# Patient Record
Sex: MALE | Race: BLACK OR AFRICAN AMERICAN | URBAN - METROPOLITAN AREA
[De-identification: names, ages, dates, MRNs, and addresses within clinical notes are randomized per-mention and may not be internally consistent; named-entity substitution may affect disease eponyms.]

---

## 2017-01-18 ENCOUNTER — INPATIENT (INPATIENT)
Facility: HOSPITAL | Age: 62
LOS: 4 days | Discharge: HOME HEALTH | End: 2017-01-23
Attending: INTERNAL MEDICINE | Admitting: INTERNAL MEDICINE
Payer: MEDICARE

## 2017-01-18 VITALS
HEART RATE: 83 BPM | TEMPERATURE: 102 F | SYSTOLIC BLOOD PRESSURE: 88 MMHG | OXYGEN SATURATION: 100 % | HEIGHT: 63 IN | DIASTOLIC BLOOD PRESSURE: 60 MMHG | RESPIRATION RATE: 21 BRPM | WEIGHT: 115.08 LBS

## 2017-01-18 DIAGNOSIS — E11.22 TYPE 2 DIABETES MELLITUS WITH DIABETIC CHRONIC KIDNEY DISEASE: ICD-10-CM

## 2017-01-18 DIAGNOSIS — A41.9 SEPSIS, UNSPECIFIED ORGANISM: ICD-10-CM

## 2017-01-18 DIAGNOSIS — G93.41 METABOLIC ENCEPHALOPATHY: ICD-10-CM

## 2017-01-18 DIAGNOSIS — N18.5 CHRONIC KIDNEY DISEASE, STAGE 5: ICD-10-CM

## 2017-01-18 DIAGNOSIS — Z93.1 GASTROSTOMY STATUS: Chronic | ICD-10-CM

## 2017-01-18 DIAGNOSIS — G40.909 EPILEPSY, UNSPECIFIED, NOT INTRACTABLE, WITHOUT STATUS EPILEPTICUS: ICD-10-CM

## 2017-01-18 DIAGNOSIS — E11.9 TYPE 2 DIABETES MELLITUS WITHOUT COMPLICATIONS: ICD-10-CM

## 2017-01-18 DIAGNOSIS — F79 UNSPECIFIED INTELLECTUAL DISABILITIES: ICD-10-CM

## 2017-01-18 DIAGNOSIS — Z93.1 GASTROSTOMY STATUS: ICD-10-CM

## 2017-01-18 DIAGNOSIS — N10 ACUTE PYELONEPHRITIS: ICD-10-CM

## 2017-01-18 DIAGNOSIS — K94.23 GASTROSTOMY MALFUNCTION: ICD-10-CM

## 2017-01-18 LAB
ALBUMIN SERPL ELPH-MCNC: 2 G/DL — LOW (ref 3.3–5)
ALP SERPL-CCNC: 73 U/L — SIGNIFICANT CHANGE UP (ref 40–120)
ALT FLD-CCNC: 14 U/L — SIGNIFICANT CHANGE UP (ref 12–78)
ANION GAP SERPL CALC-SCNC: 11 MMOL/L — SIGNIFICANT CHANGE UP (ref 5–17)
APPEARANCE UR: ABNORMAL
AST SERPL-CCNC: 22 U/L — SIGNIFICANT CHANGE UP (ref 15–37)
BACTERIA # UR AUTO: ABNORMAL
BILIRUB SERPL-MCNC: 0.3 MG/DL — SIGNIFICANT CHANGE UP (ref 0.2–1.2)
BILIRUB UR-MCNC: NEGATIVE — SIGNIFICANT CHANGE UP
BUN SERPL-MCNC: 26 MG/DL — HIGH (ref 7–23)
CALCIUM SERPL-MCNC: 8.9 MG/DL — SIGNIFICANT CHANGE UP (ref 8.5–10.1)
CHLORIDE SERPL-SCNC: 95 MMOL/L — LOW (ref 96–108)
CO2 SERPL-SCNC: 33 MMOL/L — HIGH (ref 22–31)
COLOR SPEC: YELLOW — SIGNIFICANT CHANGE UP
CREAT SERPL-MCNC: 4.42 MG/DL — HIGH (ref 0.5–1.3)
DIFF PNL FLD: ABNORMAL
ELLIPTOCYTES BLD QL SMEAR: SLIGHT — SIGNIFICANT CHANGE UP
EPI CELLS # UR: ABNORMAL
GLUCOSE SERPL-MCNC: 134 MG/DL — HIGH (ref 70–99)
GLUCOSE UR QL: NEGATIVE MG/DL — SIGNIFICANT CHANGE UP
HCT VFR BLD CALC: 30.9 % — LOW (ref 39–50)
HGB BLD-MCNC: 9.9 G/DL — LOW (ref 13–17)
HYPOCHROMIA BLD QL: SIGNIFICANT CHANGE UP
KETONES UR-MCNC: ABNORMAL
LACTATE SERPL-SCNC: 0.6 MMOL/L — LOW (ref 0.7–2)
LACTATE SERPL-SCNC: 0.7 MMOL/L — SIGNIFICANT CHANGE UP (ref 0.7–2)
LEUKOCYTE ESTERASE UR-ACNC: ABNORMAL
LYMPHOCYTES # BLD AUTO: 13 % — SIGNIFICANT CHANGE UP (ref 13–44)
MACROCYTES BLD QL: SLIGHT — SIGNIFICANT CHANGE UP
MCHC RBC-ENTMCNC: 30.6 PG — SIGNIFICANT CHANGE UP (ref 27–34)
MCHC RBC-ENTMCNC: 32.2 GM/DL — SIGNIFICANT CHANGE UP (ref 32–36)
MCV RBC AUTO: 95.2 FL — SIGNIFICANT CHANGE UP (ref 80–100)
MONOCYTES NFR BLD AUTO: 5 % — SIGNIFICANT CHANGE UP (ref 2–14)
NEUTROPHILS NFR BLD AUTO: 82 % — HIGH (ref 43–77)
NITRITE UR-MCNC: NEGATIVE — SIGNIFICANT CHANGE UP
OVALOCYTES BLD QL SMEAR: SLIGHT — SIGNIFICANT CHANGE UP
PH UR: 8 — SIGNIFICANT CHANGE UP (ref 4.8–8)
PLAT MORPH BLD: NORMAL — SIGNIFICANT CHANGE UP
PLATELET # BLD AUTO: 226 K/UL — SIGNIFICANT CHANGE UP (ref 150–400)
POLYCHROMASIA BLD QL SMEAR: SLIGHT — SIGNIFICANT CHANGE UP
POTASSIUM SERPL-MCNC: 4 MMOL/L — SIGNIFICANT CHANGE UP (ref 3.5–5.3)
POTASSIUM SERPL-SCNC: 4 MMOL/L — SIGNIFICANT CHANGE UP (ref 3.5–5.3)
PROT SERPL-MCNC: 7.2 GM/DL — SIGNIFICANT CHANGE UP (ref 6–8.3)
PROT UR-MCNC: 500 MG/DL
RBC # BLD: 3.25 M/UL — LOW (ref 4.2–5.8)
RBC # FLD: 13.9 % — SIGNIFICANT CHANGE UP (ref 11–15)
RBC BLD AUTO: ABNORMAL
RBC CASTS # UR COMP ASSIST: ABNORMAL /HPF (ref 0–4)
SODIUM SERPL-SCNC: 139 MMOL/L — SIGNIFICANT CHANGE UP (ref 135–145)
SP GR SPEC: 1.01 — SIGNIFICANT CHANGE UP (ref 1.01–1.02)
UROBILINOGEN FLD QL: NEGATIVE MG/DL — SIGNIFICANT CHANGE UP
WBC # BLD: 14.6 K/UL — HIGH (ref 3.8–10.5)
WBC # FLD AUTO: 14.6 K/UL — HIGH (ref 3.8–10.5)
WBC UR QL: >50

## 2017-01-18 PROCEDURE — 99223 1ST HOSP IP/OBS HIGH 75: CPT

## 2017-01-18 PROCEDURE — 71010: CPT | Mod: 26

## 2017-01-18 PROCEDURE — 99291 CRITICAL CARE FIRST HOUR: CPT

## 2017-01-18 RX ORDER — DEXTROSE 50 % IN WATER 50 %
25 SYRINGE (ML) INTRAVENOUS ONCE
Qty: 0 | Refills: 0 | Status: DISCONTINUED | OUTPATIENT
Start: 2017-01-18 | End: 2017-01-23

## 2017-01-18 RX ORDER — AMLODIPINE BESYLATE 2.5 MG/1
10 TABLET ORAL DAILY
Qty: 0 | Refills: 0 | Status: DISCONTINUED | OUTPATIENT
Start: 2017-01-19 | End: 2017-01-23

## 2017-01-18 RX ORDER — SODIUM CHLORIDE 9 MG/ML
1590 INJECTION INTRAMUSCULAR; INTRAVENOUS; SUBCUTANEOUS ONCE
Qty: 0 | Refills: 0 | Status: COMPLETED | OUTPATIENT
Start: 2017-01-18 | End: 2017-01-18

## 2017-01-18 RX ORDER — CALCIUM ACETATE 667 MG
667 TABLET ORAL
Qty: 0 | Refills: 0 | Status: DISCONTINUED | OUTPATIENT
Start: 2017-01-18 | End: 2017-01-23

## 2017-01-18 RX ORDER — SODIUM CHLORIDE 9 MG/ML
1000 INJECTION, SOLUTION INTRAVENOUS
Qty: 0 | Refills: 0 | Status: DISCONTINUED | OUTPATIENT
Start: 2017-01-18 | End: 2017-01-18

## 2017-01-18 RX ORDER — ACETAMINOPHEN 500 MG
650 TABLET ORAL EVERY 6 HOURS
Qty: 0 | Refills: 0 | Status: DISCONTINUED | OUTPATIENT
Start: 2017-01-18 | End: 2017-01-23

## 2017-01-18 RX ORDER — LEVETIRACETAM 250 MG/1
750 TABLET, FILM COATED ORAL
Qty: 0 | Refills: 0 | Status: DISCONTINUED | OUTPATIENT
Start: 2017-01-18 | End: 2017-01-19

## 2017-01-18 RX ORDER — SODIUM CHLORIDE 9 MG/ML
3 INJECTION INTRAMUSCULAR; INTRAVENOUS; SUBCUTANEOUS ONCE
Qty: 0 | Refills: 0 | Status: COMPLETED | OUTPATIENT
Start: 2017-01-18 | End: 2017-01-18

## 2017-01-18 RX ORDER — SIMVASTATIN 20 MG/1
20 TABLET, FILM COATED ORAL AT BEDTIME
Qty: 0 | Refills: 0 | Status: DISCONTINUED | OUTPATIENT
Start: 2017-01-18 | End: 2017-01-23

## 2017-01-18 RX ORDER — SODIUM CHLORIDE 9 MG/ML
1000 INJECTION, SOLUTION INTRAVENOUS
Qty: 0 | Refills: 0 | Status: DISCONTINUED | OUTPATIENT
Start: 2017-01-18 | End: 2017-01-23

## 2017-01-18 RX ORDER — INSULIN LISPRO 100/ML
VIAL (ML) SUBCUTANEOUS EVERY 6 HOURS
Qty: 0 | Refills: 0 | Status: DISCONTINUED | OUTPATIENT
Start: 2017-01-18 | End: 2017-01-23

## 2017-01-18 RX ORDER — GLUCAGON INJECTION, SOLUTION 0.5 MG/.1ML
1 INJECTION, SOLUTION SUBCUTANEOUS ONCE
Qty: 0 | Refills: 0 | Status: DISCONTINUED | OUTPATIENT
Start: 2017-01-18 | End: 2017-01-23

## 2017-01-18 RX ORDER — SODIUM CHLORIDE 9 MG/ML
1000 INJECTION, SOLUTION INTRAVENOUS
Qty: 0 | Refills: 0 | Status: DISCONTINUED | OUTPATIENT
Start: 2017-01-18 | End: 2017-01-19

## 2017-01-18 RX ORDER — OXCARBAZEPINE 300 MG/1
600 TABLET, FILM COATED ORAL
Qty: 0 | Refills: 0 | Status: DISCONTINUED | OUTPATIENT
Start: 2017-01-18 | End: 2017-01-23

## 2017-01-18 RX ORDER — HEPARIN SODIUM 5000 [USP'U]/ML
5000 INJECTION INTRAVENOUS; SUBCUTANEOUS EVERY 12 HOURS
Qty: 0 | Refills: 0 | Status: DISCONTINUED | OUTPATIENT
Start: 2017-01-18 | End: 2017-01-23

## 2017-01-18 RX ORDER — DEXTROSE 50 % IN WATER 50 %
1 SYRINGE (ML) INTRAVENOUS ONCE
Qty: 0 | Refills: 0 | Status: DISCONTINUED | OUTPATIENT
Start: 2017-01-18 | End: 2017-01-23

## 2017-01-18 RX ORDER — PIPERACILLIN AND TAZOBACTAM 4; .5 G/20ML; G/20ML
3.38 INJECTION, POWDER, LYOPHILIZED, FOR SOLUTION INTRAVENOUS ONCE
Qty: 0 | Refills: 0 | Status: COMPLETED | OUTPATIENT
Start: 2017-01-18 | End: 2017-01-18

## 2017-01-18 RX ORDER — ACETAMINOPHEN 500 MG
650 TABLET ORAL ONCE
Qty: 0 | Refills: 0 | Status: COMPLETED | OUTPATIENT
Start: 2017-01-18 | End: 2017-01-18

## 2017-01-18 RX ORDER — VANCOMYCIN HCL 1 G
1000 VIAL (EA) INTRAVENOUS ONCE
Qty: 0 | Refills: 0 | Status: COMPLETED | OUTPATIENT
Start: 2017-01-18 | End: 2017-01-18

## 2017-01-18 RX ORDER — ALBUTEROL 90 UG/1
1.25 AEROSOL, METERED ORAL EVERY 6 HOURS
Qty: 0 | Refills: 0 | Status: DISCONTINUED | OUTPATIENT
Start: 2017-01-18 | End: 2017-01-23

## 2017-01-18 RX ORDER — LACTOBACILLUS ACIDOPHILUS 100MM CELL
1 CAPSULE ORAL
Qty: 0 | Refills: 0 | Status: DISCONTINUED | OUTPATIENT
Start: 2017-01-18 | End: 2017-01-23

## 2017-01-18 RX ORDER — DEXTROSE 50 % IN WATER 50 %
12.5 SYRINGE (ML) INTRAVENOUS ONCE
Qty: 0 | Refills: 0 | Status: DISCONTINUED | OUTPATIENT
Start: 2017-01-18 | End: 2017-01-23

## 2017-01-18 RX ORDER — PIPERACILLIN AND TAZOBACTAM 4; .5 G/20ML; G/20ML
3.38 INJECTION, POWDER, LYOPHILIZED, FOR SOLUTION INTRAVENOUS EVERY 12 HOURS
Qty: 0 | Refills: 0 | Status: DISCONTINUED | OUTPATIENT
Start: 2017-01-18 | End: 2017-01-19

## 2017-01-18 RX ADMIN — SODIUM CHLORIDE 3 MILLILITER(S): 9 INJECTION INTRAMUSCULAR; INTRAVENOUS; SUBCUTANEOUS at 16:01

## 2017-01-18 RX ADMIN — SODIUM CHLORIDE 1590 MILLILITER(S): 9 INJECTION INTRAMUSCULAR; INTRAVENOUS; SUBCUTANEOUS at 16:01

## 2017-01-18 RX ADMIN — Medication 250 MILLIGRAM(S): at 16:01

## 2017-01-18 RX ADMIN — PIPERACILLIN AND TAZOBACTAM 200 GRAM(S): 4; .5 INJECTION, POWDER, LYOPHILIZED, FOR SOLUTION INTRAVENOUS at 16:01

## 2017-01-18 RX ADMIN — Medication 650 MILLIGRAM(S): at 15:45

## 2017-01-18 NOTE — H&P ADULT. - HISTORY OF PRESENT ILLNESS
60 y/o M with hx of MR, seizures, hld, cva with residual L sided weakness, ESRD on HD (tu, th, sat),with renal cancer thqt the family declines therapy for  presents with fever and altered mental status. patient's neice reports that he has been having fevers over the past 2 days, and increasingly lethargic over the past5 days.  patient had  dialysis last yesterday  and was subsequently sent to the emergency department. family noticed cough; no recent abd pain, vomiting; + loose, non bloody stools in the past 3 days. ROS otherwise negative

## 2017-01-18 NOTE — H&P ADULT. - PROBLEM SELECTOR PROBLEM 5
Type 2 diabetes mellitus with stage 5 chronic kidney disease not on chronic dialysis, unspecified long term insulin use status

## 2017-01-18 NOTE — ED ADULT NURSE NOTE - SEPSIS SCREEN SEVERE
Suspicion of infection and organ dysfunction symptoms are present.  The patient meets criteria for SEVERE SEPSIS and should be entered into the severe sepsis protocol.

## 2017-01-18 NOTE — ED PROVIDER NOTE - PMH
Altered mental status    Diabetes    Dialysis patient    HTN (hypertension)    Hyperglycemia    Lipids abnormal    Mental retardation    Renal failure    Seizure    Sepsis associated with vascular access catheter, subsequent encounter

## 2017-01-18 NOTE — PROGRESS NOTE ADULT - SUBJECTIVE AND OBJECTIVE BOX
PATIENT FROM MY OFFICE PRACTICE presenting with 1 day of fever and increased lethargy. Admitted to hospitalist service.  PATIENT seen and chart reviewed     Patient is a 61y old  Male who presents with a chief complaint of 1 day of fevr and lethargy    INTERVAL HPI/OVERNIGHT EVENTS:    Family at bedside report 1 week of cough and congestion  102 temp this morning    MEDICATIONS  (STANDING):  heparin  Injectable 5000Unit(s) SubCutaneous every 12 hours  dextrose 5% + sodium chloride 0.9%. 1000milliLiter(s) IV Continuous <Continuous>  piperacillin/tazobactam IVPB. 3.375Gram(s) IV Intermittent every 12 hours    MEDICATIONS  (PRN):  acetaminophen  Suppository 650milliGRAM(s) Rectal every 6 hours PRN For Temp greater than 38 C (100.4 F)        REVIEW OF SYSTEMS:  CONSTITUTIONAL: No fever, weight loss, or fatigue  EYES: No eye pain, visual disturbances, or discharge  ENMT:  No difficulty hearing, tinnitus, vertigo; No sinus or throat pain  NECK: No pain or stiffness  RESPIRATORY: No cough, wheezing, chills or hemoptysis; No shortness of breath  CARDIOVASCULAR: No chest pain, palpitations, dizziness, or leg swelling  GASTROINTESTINAL: No abdominal or epigastric pain. No nausea, vomiting, or hematemesis; No diarrhea or constipation. No melena or hematochezia.  GENITOURINARY: No dysuria, frequency, hematuria, or incontinence  NEUROLOGICAL: No headaches, memory loss, loss of strength, numbness, or tremors  SKIN: No itching, burning, rashes, or lesions      Vital Signs Last 24 Hrs  T(C): 37.7, Max: 38.9 ( @ 15:20)  T(F): 99.9, Max: 102 ( @ 15:20)  HR: 77 (77 - 83)  BP: 127/67 (88/60 - 127/67)  BP(mean): 87 (87 - 87)  RR: 16 (13 - 21)  SpO2: 95% (95% - 100%)    PHYSICAL EXAM:  GENERAL: NAD, well-groomed, well-developed  HEAD:  Atraumatic, Normocephalic  EYES: EOMI, PERRLA, conjunctiva and sclera clear  ENMT: No tonsillar erythema, exudates, or enlargement; Moist mucous membranes   NECK: Supple, No JVD   NERVOUS SYSTEM:  Alert & Oriented X3, Good concentration; Motor Strength 5/5 B/L upper and lower extremities; DTRs 2+ intact and symmetric  CHEST/LUNG: Clear to percussion bilaterally; No rales, rhonchi, wheezing, or rubs  HEART: Regular rate and rhythm; No murmurs, rubs, or gallops  ABDOMEN: Soft, Nontender, Nondistended; Bowel sounds present  EXTREMITIES:  2+ Peripheral Pulses, No clubbing, cyanosis, or edema  LYMPH: No lymphadenopathy noted  SKIN: No rashes or lesions    LABS:                        9.9    14.6  )-----------( 226      ( 2017 15:59 )             30.9     2017 15:59    139    |  95     |  26     ----------------------------<  134    4.0     |  33     |  4.42     Ca    8.9        2017 15:59    TPro  7.2    /  Alb  2.0    /  TBili  0.3    /  DBili  x      /  AST  22     /  ALT  14     /  AlkPhos  73     2017 15:59      Urinalysis Basic - ( 2017 16:03 )    Color: Yellow / Appearance: very cloudy / S.010 / pH: x  Gluc: x / Ketone: Trace  / Bili: Negative / Urobili: Negative mg/dL   Blood: x / Protein: 500 mg/dL / Nitrite: Negative   Leuk Esterase: Moderate / RBC: 3-5 /HPF / WBC >50   Sq Epi: x / Non Sq Epi: Many / Bacteria: TNTC      CAPILLARY BLOOD GLUCOSE  138 (2017 15:27)      RADIOLOGY & ADDITIONAL TESTS:    Imaging Personally Reviewed:  [ ] YES  [ ] NO    Consultant(s) Notes Reviewed:  [ ] YES  [ ] NO    Care Discussed with Consultants/Other Providers [ ] YES  [ ] NO

## 2017-01-18 NOTE — ED PROVIDER NOTE - CARE PLAN
Principal Discharge DX:	Sepsis  Secondary Diagnosis:	Altered mental status  Secondary Diagnosis:	Mental retardation

## 2017-01-18 NOTE — ED ADULT TRIAGE NOTE - CHIEF COMPLAINT QUOTE
patient BIBA he had dialysis yesterday , as per family patient had T 103 at home , patient nonverbal at this time

## 2017-01-19 DIAGNOSIS — N39.0 URINARY TRACT INFECTION, SITE NOT SPECIFIED: ICD-10-CM

## 2017-01-19 DIAGNOSIS — Z99.2 DEPENDENCE ON RENAL DIALYSIS: ICD-10-CM

## 2017-01-19 DIAGNOSIS — D72.829 ELEVATED WHITE BLOOD CELL COUNT, UNSPECIFIED: ICD-10-CM

## 2017-01-19 PROCEDURE — 99223 1ST HOSP IP/OBS HIGH 75: CPT

## 2017-01-19 RX ORDER — SODIUM CHLORIDE 9 MG/ML
1000 INJECTION, SOLUTION INTRAVENOUS
Qty: 0 | Refills: 0 | Status: DISCONTINUED | OUTPATIENT
Start: 2017-01-19 | End: 2017-01-23

## 2017-01-19 RX ORDER — SODIUM CHLORIDE 9 MG/ML
1000 INJECTION, SOLUTION INTRAVENOUS
Qty: 0 | Refills: 0 | Status: DISCONTINUED | OUTPATIENT
Start: 2017-01-19 | End: 2017-01-19

## 2017-01-19 RX ORDER — LEVETIRACETAM 250 MG/1
750 TABLET, FILM COATED ORAL EVERY 12 HOURS
Qty: 0 | Refills: 0 | Status: DISCONTINUED | OUTPATIENT
Start: 2017-01-19 | End: 2017-01-23

## 2017-01-19 RX ORDER — CEFTRIAXONE 500 MG/1
1 INJECTION, POWDER, FOR SOLUTION INTRAMUSCULAR; INTRAVENOUS EVERY 24 HOURS
Qty: 0 | Refills: 0 | Status: DISCONTINUED | OUTPATIENT
Start: 2017-01-19 | End: 2017-01-23

## 2017-01-19 RX ADMIN — SODIUM CHLORIDE 100 MILLILITER(S): 9 INJECTION, SOLUTION INTRAVENOUS at 01:07

## 2017-01-19 RX ADMIN — CEFTRIAXONE 100 GRAM(S): 500 INJECTION, POWDER, FOR SOLUTION INTRAMUSCULAR; INTRAVENOUS at 13:19

## 2017-01-19 RX ADMIN — SODIUM CHLORIDE 40 MILLILITER(S): 9 INJECTION, SOLUTION INTRAVENOUS at 17:29

## 2017-01-19 RX ADMIN — HEPARIN SODIUM 5000 UNIT(S): 5000 INJECTION INTRAVENOUS; SUBCUTANEOUS at 06:00

## 2017-01-19 RX ADMIN — LEVETIRACETAM 430 MILLIGRAM(S): 250 TABLET, FILM COATED ORAL at 05:52

## 2017-01-19 RX ADMIN — OXCARBAZEPINE 600 MILLIGRAM(S): 300 TABLET, FILM COATED ORAL at 17:23

## 2017-01-19 RX ADMIN — ALBUTEROL 1.25 MILLIGRAM(S): 90 AEROSOL, METERED ORAL at 11:16

## 2017-01-19 RX ADMIN — ALBUTEROL 1.25 MILLIGRAM(S): 90 AEROSOL, METERED ORAL at 17:18

## 2017-01-19 RX ADMIN — Medication 1 TABLET(S): at 17:19

## 2017-01-19 RX ADMIN — HEPARIN SODIUM 5000 UNIT(S): 5000 INJECTION INTRAVENOUS; SUBCUTANEOUS at 17:19

## 2017-01-19 RX ADMIN — SIMVASTATIN 20 MILLIGRAM(S): 20 TABLET, FILM COATED ORAL at 21:45

## 2017-01-19 RX ADMIN — ALBUTEROL 1.25 MILLIGRAM(S): 90 AEROSOL, METERED ORAL at 00:41

## 2017-01-19 RX ADMIN — ALBUTEROL 1.25 MILLIGRAM(S): 90 AEROSOL, METERED ORAL at 05:48

## 2017-01-19 RX ADMIN — ALBUTEROL 1.25 MILLIGRAM(S): 90 AEROSOL, METERED ORAL at 23:23

## 2017-01-19 RX ADMIN — Medication 667 MILLIGRAM(S): at 17:19

## 2017-01-19 RX ADMIN — Medication 1: at 06:05

## 2017-01-19 RX ADMIN — Medication 1: at 23:11

## 2017-01-19 RX ADMIN — PIPERACILLIN AND TAZOBACTAM 25 GRAM(S): 4; .5 INJECTION, POWDER, LYOPHILIZED, FOR SOLUTION INTRAVENOUS at 08:09

## 2017-01-19 NOTE — CHART NOTE - NSCHARTNOTEFT_GEN_A_CORE
Per RN and family Peg has not been used in about 3 months due to inability to flush peg. Family has been feeding patient nby mouth over the past couple of months. Patient currently unable to take po medications or food. Keppra changed to IV. recommend GI consult for change of Peg.

## 2017-01-19 NOTE — PROGRESS NOTE ADULT - SUBJECTIVE AND OBJECTIVE BOX
Patient is a 61y old  Male who presents with a chief complaint of     INTERVAL HPI/OVERNIGHT EVENTS:  More awake and alert  tolerating po apple sauce  afebrile    MEDICATIONS  (STANDING):  heparin  Injectable 5000Unit(s) SubCutaneous every 12 hours  OXcarbazepine 600milliGRAM(s) Oral two times a day  simvastatin 20milliGRAM(s) Oral at bedtime  calcium acetate 667milliGRAM(s) Oral three times a day with meals  lactobacillus acidophilus 1Tablet(s) Oral two times a day with meals  multivitamin 1Tablet(s) Oral daily  amLODIPine   Tablet 10milliGRAM(s) Oral daily  ALBUTerol   0.042% 1.25milliGRAM(s) Nebulizer every 6 hours  insulin lispro (HumaLOG) corrective regimen sliding scale  SubCutaneous every 6 hours  dextrose 5%. 1000milliLiter(s) IV Continuous <Continuous>  dextrose 50% Injectable 12.5Gram(s) IV Push once  dextrose 50% Injectable 25Gram(s) IV Push once  dextrose 50% Injectable 25Gram(s) IV Push once  levETIRAcetam  IVPB 750milliGRAM(s) IV Intermittent every 12 hours  cefTRIAXone   IVPB 1Gram(s) IV Intermittent every 24 hours  dextrose 5% + sodium chloride 0.45%. 1000milliLiter(s) IV Continuous <Continuous>    MEDICATIONS  (PRN):  acetaminophen  Suppository 650milliGRAM(s) Rectal every 6 hours PRN For Temp greater than 38 C (100.4 F)  dextrose Gel 1Dose(s) Oral once PRN Blood Glucose LESS THAN 70 milliGRAM(s)/deciliter  glucagon  Injectable 1milliGRAM(s) IntraMuscular once PRN Glucose LESS THAN 70 milligrams/deciliter        REVIEW OF SYSTEMS:  CONSTITUTIONAL: No fever, weight loss, or fatigue  EYES: No eye pain, visual disturbances, or discharge  ENMT:  No difficulty hearing, tinnitus, vertigo; No sinus or throat pain  NECK: No pain or stiffness  RESPIRATORY: No cough, wheezing, chills or hemoptysis; No shortness of breath  CARDIOVASCULAR: No chest pain, palpitations, dizziness, or leg swelling  GASTROINTESTINAL: No abdominal or epigastric pain. No nausea, vomiting, or hematemesis; No diarrhea or constipation. No melena or hematochezia.  GENITOURINARY: No dysuria, frequency, hematuria, or incontinence  NEUROLOGICAL: No headaches, memory loss, loss of strength, numbness, or tremors  SKIN: No itching, burning, rashes, or lesions      Vital Signs Last 24 Hrs  T(C): 36.7, Max: 37.7 ( @ 18:04)  T(F): 98, Max: 99.9 ( @ 18:04)  HR: 77 (70 - 77)  BP: 129/78 (127/67 - 135/78)  BP(mean): 87 (87 - 87)  RR: 18 (13 - 20)  SpO2: 97% (95% - 98%)    PHYSICAL EXAM:  GENERAL: NAD, well-groomed, well-developed  HEAD:  Atraumatic, Normocephalic  EYES: EOMI, PERRLA, conjunctiva and sclera clear  ENMT: No tonsillar erythema, exudates, or enlargement; Moist mucous membranes   NECK: Supple, No JVD   NERVOUS SYSTEM:  Alert & Oriented X3, Good concentration; Motor Strength 5/5 B/L upper and lower extremities; DTRs 2+ intact and symmetric  CHEST/LUNG: Clear to percussion bilaterally; No rales, rhonchi, wheezing, or rubs. left CW permacath  HEART: Regular rate and rhythm; No murmurs, rubs, or gallops  ABDOMEN: Soft, Nontender, Nondistended; Bowel sounds present. PEG in place clogged  EXTREMITIES:  2+ Peripheral Pulses, No clubbing, cyanosis, or edema  LYMPH: No lymphadenopathy noted  SKIN: No rashes or lesions    LABS:                        9.9    14.6  )-----------( 226      ( 2017 15:59 )             30.9     2017 15:59    139    |  95     |  26     ----------------------------<  134    4.0     |  33     |  4.42     Ca    8.9        2017 15:59    TPro  7.2    /  Alb  2.0    /  TBili  0.3    /  DBili  x      /  AST  22     /  ALT  14     /  AlkPhos  73     2017 15:59      Urinalysis Basic - ( 2017 16:03 )    Color: Yellow / Appearance: very cloudy / S.010 / pH: x  Gluc: x / Ketone: Trace  / Bili: Negative / Urobili: Negative mg/dL   Blood: x / Protein: 500 mg/dL / Nitrite: Negative   Leuk Esterase: Moderate / RBC: 3-5 /HPF / WBC >50   Sq Epi: x / Non Sq Epi: Many / Bacteria: TNTC      CAPILLARY BLOOD GLUCOSE  165 (2017 11:47)  133 (2017 23:23)      RADIOLOGY & ADDITIONAL TESTS:    Imaging Personally Reviewed:  [ ] YES  [ ] NO    Consultant(s) Notes Reviewed:  [ ] YES  [ ] NO    Care Discussed with Consultants/Other Providers [ ] YES  [ ] NO

## 2017-01-19 NOTE — PATIENT PROFILE ADULT. - NUTRITION PROFILE
enteral nutrition evaluation/chewing/swallowing difficulty/pressure ulcer Stage 2 or greater/unintentional weight loss/dialysis

## 2017-01-19 NOTE — CONSULT NOTE ADULT - SUBJECTIVE AND OBJECTIVE BOX
Infectious Diseases - Attending at Dr. Glover    HPI:  62 y/o M with hx of MR, seizures, hld, cva with residual L sided weakness, ESRD on HD (, th, sat),with renal cancer thqt the family declines therapy for  presents with fever and altered mental status. patient's niece reports that he has been having fevers over the past 2 days, and increasingly lethargic over the past 5 days.  patient had  dialysis last yesterday  and was subsequently sent to the emergency department. family noticed cough; no recent abd pain, vomiting; + loose, non bloody stools in the past 3 days. ROS otherwise negative (2017 19:30)      PAST MEDICAL & SURGICAL HISTORY:  Sepsis associated with vascular access catheter, subsequent encounter  Hyperglycemia  Altered mental status  Dialysis patient  Diabetes  Lipids abnormal  Renal failure  Seizure  HTN (hypertension)  Mental retardation  PEG (percutaneous endoscopic gastrostomy) status  No significant past surgical history      Allergies    Allergy Status Unknown    Intolerances        FAMILY HISTORY:  No pertinent family history in first degree relatives      SOCIAL HISTORY: No smoking, alcohol use    REVIEW OF SYSTEMS:  Unobtainable    MEDICATIONS  (STANDING):  heparin  Injectable 5000Unit(s) SubCutaneous every 12 hours  OXcarbazepine 600milliGRAM(s) Oral two times a day  simvastatin 20milliGRAM(s) Oral at bedtime  calcium acetate 667milliGRAM(s) Oral three times a day with meals  lactobacillus acidophilus 1Tablet(s) Oral two times a day with meals  multivitamin 1Tablet(s) Oral daily  amLODIPine   Tablet 10milliGRAM(s) Oral daily  ALBUTerol   0.042% 1.25milliGRAM(s) Nebulizer every 6 hours  insulin lispro (HumaLOG) corrective regimen sliding scale  SubCutaneous every 6 hours  dextrose 5%. 1000milliLiter(s) IV Continuous <Continuous>  dextrose 50% Injectable 12.5Gram(s) IV Push once  dextrose 50% Injectable 25Gram(s) IV Push once  dextrose 50% Injectable 25Gram(s) IV Push once  levETIRAcetam  IVPB 750milliGRAM(s) IV Intermittent every 12 hours  cefTRIAXone   IVPB 1Gram(s) IV Intermittent every 24 hours  dextrose 5% + sodium chloride 0.45%. 1000milliLiter(s) IV Continuous <Continuous>    MEDICATIONS  (PRN):  acetaminophen  Suppository 650milliGRAM(s) Rectal every 6 hours PRN For Temp greater than 38 C (100.4 F)  dextrose Gel 1Dose(s) Oral once PRN Blood Glucose LESS THAN 70 milliGRAM(s)/deciliter  glucagon  Injectable 1milliGRAM(s) IntraMuscular once PRN Glucose LESS THAN 70 milligrams/deciliter      Vital Signs Last 24 Hrs  T(C): 36.7, Max: 37.7 ( @ 18:04)  T(F): 98, Max: 99.9 ( @ 18:04)  HR: 77 (70 - 77)  BP: 129/78 (114/40 - 135/78)  BP(mean): 87 (87 - 87)  RR: 18 (13 - 20)  SpO2: 97% (95% - 98%)    PHYSICAL EXAM:    Constitutional: NAD, well-groomed, well-developed  HEENT: PERRLA, EOMI, Normal Hearing, MMM  Neck: No LAD, No JVD  Back: Normal spine flexure, No CVA tenderness  Respiratory: CTAB/L   Cardiovascular: S1 and S2, RRR, no M/G/R  Gastrointestinal: BS+, soft, NT/ND  Extremities: No peripheral edema  Vascular: 2+ peripheral pulses  Neurological: A/O x 3, no focal deficits  Skin: No rashes      LABS:                        9.9    14.6  )-----------( 226      ( 2017 15:59 )             30.9     2017 15:59    139    |  95     |  26     ----------------------------<  134    4.0     |  33     |  4.42     Ca    8.9        2017 15:59    TPro  7.2    /  Alb  2.0    /  TBili  0.3    /  DBili  x      /  AST  22     /  ALT  14     /  AlkPhos  73     2017 15:59      Urinalysis Basic - ( 2017 16:03 )    Color: Yellow / Appearance: very cloudy / S.010 / pH: x  Gluc: x / Ketone: Trace  / Bili: Negative / Urobili: Negative mg/dL   Blood: x / Protein: 500 mg/dL / Nitrite: Negative   Leuk Esterase: Moderate / RBC: 3-5 /HPF / WBC >50   Sq Epi: x / Non Sq Epi: Many / Bacteria: TNTC        MICROBIOLOGY:  RECENT CULTURES:        RADIOLOGY & ADDITIONAL STUDIES:
Patient is a 61y old  Male well known to us from multiple prior admissions to this institution, exists in chronic vegetative state, on HD thrice weekly for ESRD who was admitted yest due to fever, lethargy, clogged G-tube. Pos cough, loose BMs. Presently in no distress. Sister at bed side.           PAST MEDICAL & SURGICAL HISTORY:  Sepsis associated with vascular access catheter, subsequent encounter  Hyperglycemia  Altered mental status  Dialysis patient  Diabetes  Lipids abnormal  Renal failure  Seizure  HTN (hypertension)  Mental retardation  PEG (percutaneous endoscopic gastrostomy) status  No significant past surgical history       FAMILY HISTORY:  No pertinent family history in first degree relatives      Allergies    Allergy Status Unknown          MEDICATIONS  (STANDING):  heparin  Injectable 5000Unit(s) SubCutaneous every 12 hours  piperacillin/tazobactam IVPB. 3.375Gram(s) IV Intermittent every 12 hours  OXcarbazepine 600milliGRAM(s) Oral two times a day  simvastatin 20milliGRAM(s) Oral at bedtime  calcium acetate 667milliGRAM(s) Oral three times a day with meals  lactobacillus acidophilus 1Tablet(s) Oral two times a day with meals  multivitamin 1Tablet(s) Oral daily  amLODIPine   Tablet 10milliGRAM(s) Oral daily  ALBUTerol   0.042% 1.25milliGRAM(s) Nebulizer every 6 hours  insulin lispro (HumaLOG) corrective regimen sliding scale  SubCutaneous every 6 hours  dextrose 5%. 1000milliLiter(s) IV Continuous <Continuous>  dextrose 50% Injectable 12.5Gram(s) IV Push once  dextrose 50% Injectable 25Gram(s) IV Push once  dextrose 50% Injectable 25Gram(s) IV Push once  levETIRAcetam  IVPB 750milliGRAM(s) IV Intermittent every 12 hours    MEDICATIONS  (PRN):  acetaminophen  Suppository 650milliGRAM(s) Rectal every 6 hours PRN For Temp greater than 38 C (100.4 F)  dextrose Gel 1Dose(s) Oral once PRN Blood Glucose LESS THAN 70 milliGRAM(s)/deciliter  glucagon  Injectable 1milliGRAM(s) IntraMuscular once PRN Glucose LESS THAN 70 milligrams/deciliter      Daily Height in cm: 160.02 (2017 15:20)    Daily     Drug Dosing Weight  Height (cm): 160 (2017 15:20)  Weight (kg): 46 (2017 23:23)  BMI (kg/m2): 18 (2017 23:23)  BSA (m2): 1.45 (2017 23:23)      REVIEW OF SYSTEMS:  As per HPI            I&O's Detail      I & Os for current day (as of  @ 11:17)  =============================================  IN: 1000 ml / OUT: 0 ml / NET: 1000 ml      PHYSICAL EXAM:    GENERAL: NAD, on VM  HEAD:  Atraumatic, Normocephalic  EYES: EOMI, PERRLA, conjunctiva and sclera clear  ENMT: No tonsillar erythema, exudates, or enlargement; Moist mucous membranes, Good dentition, No lesions  NECK: Supple, No JVD, Normal thyroid  NERVOUS SYSTEM:  Alert & Oriented X3, Good concentration; Motor Strength 5/5 B/L upper and lower extremities; DTRs 2+ intact and symmetric  CHEST/LUNG: coarse BS  HEART: Regular rate and rhythm; No murmurs, rubs, or gallops  ABDOMEN: Soft, Nontender, Nondistended; Bowel sounds present  EXTREMITIES:  2+ Peripheral Pulses, No clubbing, cyanosis, or edema  LYMPH: No lymphadenopathy noted  SKIN: No rashes or lesions  ACCESS: L chest PC    LABS:  CBC Full  -  ( 2017 15:59 )  WBC Count : 14.6 K/uL  Hemoglobin : 9.9 g/dL  Hematocrit : 30.9 %  Platelet Count - Automated : 226 K/uL  Mean Cell Volume : 95.2 fl  Mean Cell Hemoglobin : 30.6 pg  Mean Cell Hemoglobin Concentration : 32.2 gm/dL  Auto Neutrophil # : x  Auto Lymphocyte # : x  Auto Monocyte # : x  Auto Eosinophil # : x  Auto Basophil # : x  Auto Neutrophil % : 82.0 %  Auto Lymphocyte % : 13.0 %  Auto Monocyte % : 5.0 %  Auto Eosinophil % : x  Auto Basophil % : x    2017 15:59    139    |  95     |  26     ----------------------------<  134    4.0     |  33     |  4.42     Ca    8.9        2017 15:59    TPro  7.2    /  Alb  2.0    /  TBili  0.3    /  DBili  x      /  AST  22     /  ALT  14     /  AlkPhos  73     2017 15:59    CAPILLARY BLOOD GLUCOSE  133 (2017 23:23)      Urinalysis Basic - ( 2017 16:03 )    Color: Yellow / Appearance: very cloudy / S.010 / pH: x  Gluc: x / Ketone: Trace  / Bili: Negative / Urobili: Negative mg/dL   Blood: x / Protein: 500 mg/dL / Nitrite: Negative   Leuk Esterase: Moderate / RBC: 3-5 /HPF / WBC >50   Sq Epi: x / Non Sq Epi: Many / Bacteria: TNTC        ASSESSMENT and PLAN:    * Fever -- r/o septicemia, cath associated bacteremia. On empiric Zosyn, Vanco. Follow BCs  * GI eval of dislodged PEG  * No dialytic indication today. Will plan HD for  as Rxed. Consent by sister on chart
full consult dictated

## 2017-01-20 DIAGNOSIS — N30.90 CYSTITIS, UNSPECIFIED WITHOUT HEMATURIA: ICD-10-CM

## 2017-01-20 DIAGNOSIS — N39.0 URINARY TRACT INFECTION, SITE NOT SPECIFIED: ICD-10-CM

## 2017-01-20 LAB
-  AMIKACIN: SIGNIFICANT CHANGE UP
-  AMPICILLIN/SULBACTAM: SIGNIFICANT CHANGE UP
-  AMPICILLIN: SIGNIFICANT CHANGE UP
-  AZTREONAM: SIGNIFICANT CHANGE UP
-  CEFAZOLIN: SIGNIFICANT CHANGE UP
-  CEFEPIME: SIGNIFICANT CHANGE UP
-  CEFOXITIN: SIGNIFICANT CHANGE UP
-  CEFTAZIDIME: SIGNIFICANT CHANGE UP
-  CEFTRIAXONE: SIGNIFICANT CHANGE UP
-  CIPROFLOXACIN: SIGNIFICANT CHANGE UP
-  ERTAPENEM: SIGNIFICANT CHANGE UP
-  GENTAMICIN: SIGNIFICANT CHANGE UP
-  IMIPENEM: SIGNIFICANT CHANGE UP
-  LEVOFLOXACIN: SIGNIFICANT CHANGE UP
-  MEROPENEM: SIGNIFICANT CHANGE UP
-  NITROFURANTOIN: SIGNIFICANT CHANGE UP
-  PIPERACILLIN/TAZOBACTAM: SIGNIFICANT CHANGE UP
-  TOBRAMYCIN: SIGNIFICANT CHANGE UP
-  TRIMETHOPRIM/SULFAMETHOXAZOLE: SIGNIFICANT CHANGE UP
ANION GAP SERPL CALC-SCNC: 11 MMOL/L — SIGNIFICANT CHANGE UP (ref 5–17)
BUN SERPL-MCNC: 35 MG/DL — HIGH (ref 7–23)
CALCIUM SERPL-MCNC: 8 MG/DL — LOW (ref 8.5–10.1)
CHLORIDE SERPL-SCNC: 96 MMOL/L — SIGNIFICANT CHANGE UP (ref 96–108)
CO2 SERPL-SCNC: 30 MMOL/L — SIGNIFICANT CHANGE UP (ref 22–31)
CREAT SERPL-MCNC: 5.48 MG/DL — HIGH (ref 0.5–1.3)
CULTURE RESULTS: SIGNIFICANT CHANGE UP
GLUCOSE SERPL-MCNC: 208 MG/DL — HIGH (ref 70–99)
HCT VFR BLD CALC: 28.5 % — LOW (ref 39–50)
HGB BLD-MCNC: 9.3 G/DL — LOW (ref 13–17)
MCHC RBC-ENTMCNC: 30.6 PG — SIGNIFICANT CHANGE UP (ref 27–34)
MCHC RBC-ENTMCNC: 32.5 GM/DL — SIGNIFICANT CHANGE UP (ref 32–36)
MCV RBC AUTO: 94.1 FL — SIGNIFICANT CHANGE UP (ref 80–100)
METHOD TYPE: SIGNIFICANT CHANGE UP
ORGANISM # SPEC MICROSCOPIC CNT: SIGNIFICANT CHANGE UP
ORGANISM # SPEC MICROSCOPIC CNT: SIGNIFICANT CHANGE UP
PLATELET # BLD AUTO: 208 K/UL — SIGNIFICANT CHANGE UP (ref 150–400)
POTASSIUM SERPL-MCNC: 3.6 MMOL/L — SIGNIFICANT CHANGE UP (ref 3.5–5.3)
POTASSIUM SERPL-SCNC: 3.6 MMOL/L — SIGNIFICANT CHANGE UP (ref 3.5–5.3)
RBC # BLD: 3.03 M/UL — LOW (ref 4.2–5.8)
RBC # FLD: 14.2 % — SIGNIFICANT CHANGE UP (ref 11–15)
SODIUM SERPL-SCNC: 137 MMOL/L — SIGNIFICANT CHANGE UP (ref 135–145)
SPECIMEN SOURCE: SIGNIFICANT CHANGE UP
WBC # BLD: 9.8 K/UL — SIGNIFICANT CHANGE UP (ref 3.8–10.5)
WBC # FLD AUTO: 9.8 K/UL — SIGNIFICANT CHANGE UP (ref 3.8–10.5)

## 2017-01-20 PROCEDURE — 49465 FLUORO EXAM OF G/COLON TUBE: CPT

## 2017-01-20 PROCEDURE — 99232 SBSQ HOSP IP/OBS MODERATE 35: CPT

## 2017-01-20 RX ADMIN — Medication 667 MILLIGRAM(S): at 07:45

## 2017-01-20 RX ADMIN — ALBUTEROL 1.25 MILLIGRAM(S): 90 AEROSOL, METERED ORAL at 18:33

## 2017-01-20 RX ADMIN — ALBUTEROL 1.25 MILLIGRAM(S): 90 AEROSOL, METERED ORAL at 12:07

## 2017-01-20 RX ADMIN — OXCARBAZEPINE 600 MILLIGRAM(S): 300 TABLET, FILM COATED ORAL at 06:07

## 2017-01-20 RX ADMIN — LEVETIRACETAM 430 MILLIGRAM(S): 250 TABLET, FILM COATED ORAL at 21:49

## 2017-01-20 RX ADMIN — Medication 1: at 11:10

## 2017-01-20 RX ADMIN — ALBUTEROL 1.25 MILLIGRAM(S): 90 AEROSOL, METERED ORAL at 06:51

## 2017-01-20 RX ADMIN — AMLODIPINE BESYLATE 10 MILLIGRAM(S): 2.5 TABLET ORAL at 06:08

## 2017-01-20 RX ADMIN — Medication 1 TABLET(S): at 07:45

## 2017-01-20 RX ADMIN — Medication 1 TABLET(S): at 11:11

## 2017-01-20 RX ADMIN — HEPARIN SODIUM 5000 UNIT(S): 5000 INJECTION INTRAVENOUS; SUBCUTANEOUS at 06:08

## 2017-01-20 RX ADMIN — OXCARBAZEPINE 600 MILLIGRAM(S): 300 TABLET, FILM COATED ORAL at 21:50

## 2017-01-20 RX ADMIN — Medication 1: at 23:41

## 2017-01-20 RX ADMIN — SIMVASTATIN 20 MILLIGRAM(S): 20 TABLET, FILM COATED ORAL at 21:50

## 2017-01-20 RX ADMIN — LEVETIRACETAM 430 MILLIGRAM(S): 250 TABLET, FILM COATED ORAL at 06:07

## 2017-01-20 RX ADMIN — CEFTRIAXONE 100 GRAM(S): 500 INJECTION, POWDER, FOR SOLUTION INTRAMUSCULAR; INTRAVENOUS at 13:06

## 2017-01-20 RX ADMIN — Medication 667 MILLIGRAM(S): at 11:12

## 2017-01-20 NOTE — PROGRESS NOTE ADULT - SUBJECTIVE AND OBJECTIVE BOX
Patient seen in follow up for ESRD. Appears comfortable.     MEDICATIONS  (STANDING):  heparin  Injectable 5000Unit(s) SubCutaneous every 12 hours  OXcarbazepine 600milliGRAM(s) Oral two times a day  simvastatin 20milliGRAM(s) Oral at bedtime  calcium acetate 667milliGRAM(s) Oral three times a day with meals  lactobacillus acidophilus 1Tablet(s) Oral two times a day with meals  multivitamin 1Tablet(s) Oral daily  amLODIPine   Tablet 10milliGRAM(s) Oral daily  ALBUTerol   0.042% 1.25milliGRAM(s) Nebulizer every 6 hours  insulin lispro (HumaLOG) corrective regimen sliding scale  SubCutaneous every 6 hours  dextrose 5%. 1000milliLiter(s) IV Continuous <Continuous>  dextrose 50% Injectable 12.5Gram(s) IV Push once  dextrose 50% Injectable 25Gram(s) IV Push once  dextrose 50% Injectable 25Gram(s) IV Push once  levETIRAcetam  IVPB 750milliGRAM(s) IV Intermittent every 12 hours  cefTRIAXone   IVPB 1Gram(s) IV Intermittent every 24 hours  dextrose 5%. 1000milliLiter(s) IV Continuous <Continuous>    MEDICATIONS  (PRN):  acetaminophen  Suppository 650milliGRAM(s) Rectal every 6 hours PRN For Temp greater than 38 C (100.4 F)  dextrose Gel 1Dose(s) Oral once PRN Blood Glucose LESS THAN 70 milliGRAM(s)/deciliter  glucagon  Injectable 1milliGRAM(s) IntraMuscular once PRN Glucose LESS THAN 70 milligrams/deciliter    PHYSICAL EXAM:      T(C): 36.8, Max: 37.2 (01-19 @ 16:54)  HR: 72 (72 - 80)  BP: 144/83 (135/79 - 150/83)  RR: 18 (18 - 18)  SpO2: 98% (96% - 100%)  Wt(kg): --  Respiratory: clear anteriorly, decreased BS at bases  Cardiovascular: S1 S2  Gastrointestinal: soft NT ND +BS  Extremities:    tr edema                                    9.9    14.6  )-----------( 226      ( 18 Jan 2017 15:59 )             30.9     18 Jan 2017 15:59    139    |  95     |  26     ----------------------------<  134    4.0     |  33     |  4.42     Ca    8.9        18 Jan 2017 15:59    TPro  7.2    /  Alb  2.0    /  TBili  0.3    /  DBili  x      /  AST  22     /  ALT  14     /  AlkPhos  73     18 Jan 2017 15:59      LIVER FUNCTIONS - ( 18 Jan 2017 15:59 )  Alb: 2.0 g/dL / Pro: 7.2 gm/dL / ALK PHOS: 73 U/L / ALT: 14 U/L / AST: 22 U/L / GGT: x             Assessment and Plan:  Maintenance HD  UTI;   Will follow.

## 2017-01-20 NOTE — PROGRESS NOTE ADULT - SUBJECTIVE AND OBJECTIVE BOX
Patient is a 61y old  Male who presents with a chief complaint of fever    INTERVAL HPI / OVERNIGHT EVENTS:No fever ,more alert    MEDICATIONS  (STANDING):  heparin  Injectable 5000Unit(s) SubCutaneous every 12 hours  OXcarbazepine 600milliGRAM(s) Oral two times a day  simvastatin 20milliGRAM(s) Oral at bedtime  calcium acetate 667milliGRAM(s) Oral three times a day with meals  lactobacillus acidophilus 1Tablet(s) Oral two times a day with meals  multivitamin 1Tablet(s) Oral daily  amLODIPine   Tablet 10milliGRAM(s) Oral daily  ALBUTerol   0.042% 1.25milliGRAM(s) Nebulizer every 6 hours  insulin lispro (HumaLOG) corrective regimen sliding scale  SubCutaneous every 6 hours  dextrose 5%. 1000milliLiter(s) IV Continuous <Continuous>  dextrose 50% Injectable 12.5Gram(s) IV Push once  dextrose 50% Injectable 25Gram(s) IV Push once  dextrose 50% Injectable 25Gram(s) IV Push once  levETIRAcetam  IVPB 750milliGRAM(s) IV Intermittent every 12 hours  cefTRIAXone   IVPB 1Gram(s) IV Intermittent every 24 hours  dextrose 5%. 1000milliLiter(s) IV Continuous <Continuous>    MEDICATIONS  (PRN):  acetaminophen  Suppository 650milliGRAM(s) Rectal every 6 hours PRN For Temp greater than 38 C (100.4 F)  dextrose Gel 1Dose(s) Oral once PRN Blood Glucose LESS THAN 70 milliGRAM(s)/deciliter  glucagon  Injectable 1milliGRAM(s) IntraMuscular once PRN Glucose LESS THAN 70 milligrams/deciliter      Vital Signs Last 24 Hrs  T(C): 36.7, Max: 37 (01-20 @ 05:40)  T(F): 98, Max: 98.6 (01-20 @ 05:40)  HR: 106 (72 - 106)  BP: 156/84 (132/80 - 156/84)  BP(mean): --  RR: 17 (17 - 18)  SpO2: 98% (97% - 100%)    PHYSICAL EXAM:    Constitutional: NAD, well-groomed, well-developed  Respiratory: CTAB/L  Cardiovascular: S1 and S2, RRR, no M/G/R  Gastrointestinal: BS+, soft, NT/ND  Extremities: No peripheral edema  Vascular: 2+ peripheral pulses  Neuro: more awake ,alert ,non verbal  Skin: No rashes      LABS:                        9.3    9.8   )-----------( 208      ( 20 Jan 2017 17:59 )             28.5     20 Jan 2017 17:59    137    |  96     |  35     ----------------------------<  208    3.6     |  30     |  5.48     Ca    8.0        20 Jan 2017 17:59              MICROBIOLOGY:  RECENT CULTURES:  01-19 .Urine Catheterized Klebsiella pneumoniae XXXX   >100,000 CFU/ml Klebsiella pneumoniae    01-19 .Blood Blood-Peripheral XXXX XXXX   No growth to date.          RADIOLOGY & ADDITIONAL STUDIES:

## 2017-01-20 NOTE — PROGRESS NOTE ADULT - SUBJECTIVE AND OBJECTIVE BOX
Pt with clogged peg - takes PO but needs peg changed      MEDICATIONS  (STANDING):  heparin  Injectable 5000Unit(s) SubCutaneous every 12 hours  OXcarbazepine 600milliGRAM(s) Oral two times a day  simvastatin 20milliGRAM(s) Oral at bedtime  calcium acetate 667milliGRAM(s) Oral three times a day with meals  lactobacillus acidophilus 1Tablet(s) Oral two times a day with meals  multivitamin 1Tablet(s) Oral daily  amLODIPine   Tablet 10milliGRAM(s) Oral daily  ALBUTerol   0.042% 1.25milliGRAM(s) Nebulizer every 6 hours  insulin lispro (HumaLOG) corrective regimen sliding scale  SubCutaneous every 6 hours  dextrose 5%. 1000milliLiter(s) IV Continuous <Continuous>  dextrose 50% Injectable 12.5Gram(s) IV Push once  dextrose 50% Injectable 25Gram(s) IV Push once  dextrose 50% Injectable 25Gram(s) IV Push once  levETIRAcetam  IVPB 750milliGRAM(s) IV Intermittent every 12 hours  cefTRIAXone   IVPB 1Gram(s) IV Intermittent every 24 hours  dextrose 5%. 1000milliLiter(s) IV Continuous <Continuous>    MEDICATIONS  (PRN):  acetaminophen  Suppository 650milliGRAM(s) Rectal every 6 hours PRN For Temp greater than 38 C (100.4 F)  dextrose Gel 1Dose(s) Oral once PRN Blood Glucose LESS THAN 70 milliGRAM(s)/deciliter  glucagon  Injectable 1milliGRAM(s) IntraMuscular once PRN Glucose LESS THAN 70 milligrams/deciliter      Allergies    Allergy Status Unknown    Intolerances        Vital Signs Last 24 Hrs  T(C): 37, Max: 37.2 (01-19 @ 16:54)  T(F): 98.6, Max: 98.9 (01-19 @ 16:54)  HR: 76 (70 - 80)  BP: 146/80 (129/78 - 150/83)  BP(mean): --  RR: 18 (18 - 18)  SpO2: 97% (96% - 100%)    PHYSICAL EXAM:  General: NAD.  CVS: S1, S2  Chest: air entry bilaterally present  Abd: BS present, soft, non-tender      LABS:                        9.9    14.6  )-----------( 226      ( 18 Jan 2017 15:59 )             30.9     18 Jan 2017 15:59    139    |  95     |  26     ----------------------------<  134    4.0     |  33     |  4.42     Ca    8.9        18 Jan 2017 15:59    TPro  7.2    /  Alb  2.0    /  TBili  0.3    /  DBili  x      /  AST  22     /  ALT  14     /  AlkPhos  73     18 Jan 2017 15:59      Old peg removal attempted by invert- a - tube method. Tube must have been old and rigid. On removing tube bumper tore off from tube and remains in stomach. A 22f peg replacement tube placed without difficulty and good auscultation obtained.  A KUB with hypaque will be ordered to confirm placement of peg. The bumper will likely pass on its own - will however continue to monitor for any signs of obstruction.

## 2017-01-20 NOTE — PROGRESS NOTE ADULT - SUBJECTIVE AND OBJECTIVE BOX
Patient is a 61y old  Male who presents with a chief complaint of     INTERVAL HPI/OVERNIGHT EVENTS:  urine culture: greater than 100,000 cfu gram negative rods  sensitivity and specificity pending  afebrile  for hemodialysis today    MEDICATIONS  (STANDING):  heparin  Injectable 5000Unit(s) SubCutaneous every 12 hours  OXcarbazepine 600milliGRAM(s) Oral two times a day  simvastatin 20milliGRAM(s) Oral at bedtime  calcium acetate 667milliGRAM(s) Oral three times a day with meals  lactobacillus acidophilus 1Tablet(s) Oral two times a day with meals  multivitamin 1Tablet(s) Oral daily  amLODIPine   Tablet 10milliGRAM(s) Oral daily  ALBUTerol   0.042% 1.25milliGRAM(s) Nebulizer every 6 hours  insulin lispro (HumaLOG) corrective regimen sliding scale  SubCutaneous every 6 hours  dextrose 5%. 1000milliLiter(s) IV Continuous <Continuous>  dextrose 50% Injectable 12.5Gram(s) IV Push once  dextrose 50% Injectable 25Gram(s) IV Push once  dextrose 50% Injectable 25Gram(s) IV Push once  levETIRAcetam  IVPB 750milliGRAM(s) IV Intermittent every 12 hours  cefTRIAXone   IVPB 1Gram(s) IV Intermittent every 24 hours  dextrose 5% + sodium chloride 0.45%. 1000milliLiter(s) IV Continuous <Continuous>    MEDICATIONS  (PRN):  acetaminophen  Suppository 650milliGRAM(s) Rectal every 6 hours PRN For Temp greater than 38 C (100.4 F)  dextrose Gel 1Dose(s) Oral once PRN Blood Glucose LESS THAN 70 milliGRAM(s)/deciliter  glucagon  Injectable 1milliGRAM(s) IntraMuscular once PRN Glucose LESS THAN 70 milligrams/deciliter        REVIEW OF SYSTEMS:  unable to contribute  Vital Signs Last 24 Hrs  T(C): 36.7, Max: 37.7 ( @ 18:04)  T(F): 98, Max: 99.9 ( @ 18:04)  HR: 77 (70 - 77)  BP: 129/78 (127/67 - 135/78)  BP(mean): 87 (87 - 87)  RR: 18 (13 - 20)  SpO2: 97% (95% - 98%)    PHYSICAL EXAM:  GENERAL: NAD, well-groomed, well-developed  HEAD:  Atraumatic, Normocephalic  EYES: EOMI, PERRLA, conjunctiva and sclera clear  ENMT: No tonsillar erythema, exudates, or enlargement; Moist mucous membranes   NECK: Supple, No JVD   NERVOUS SYSTEM:  Alert & Oriented X3, Good concentration; Motor Strength 5/5 B/L upper and lower extremities; DTRs 2+ intact and symmetric  CHEST/LUNG: Clear to percussion bilaterally; No rales, rhonchi, wheezing, or rubs. left CW permacath  HEART: Regular rate and rhythm; No murmurs, rubs, or gallops  ABDOMEN: Soft, Nontender, Nondistended; Bowel sounds present. PEG in place clogged  EXTREMITIES:  2+ Peripheral Pulses, No clubbing, cyanosis, or edema  LYMPH: No lymphadenopathy noted  SKIN: No rashes or lesions    LABS:                        9.9    14.6  )-----------( 226      ( 2017 15:59 )             30.9     2017 15:59    139    |  95     |  26     ----------------------------<  134    4.0     |  33     |  4.42     Ca    8.9        2017 15:59    TPro  7.2    /  Alb  2.0    /  TBili  0.3    /  DBili  x      /  AST  22     /  ALT  14     /  AlkPhos  73     2017 15:59      Urinalysis Basic - ( 2017 16:03 )    Color: Yellow / Appearance: very cloudy / S.010 / pH: x  Gluc: x / Ketone: Trace  / Bili: Negative / Urobili: Negative mg/dL   Blood: x / Protein: 500 mg/dL / Nitrite: Negative   Leuk Esterase: Moderate / RBC: 3-5 /HPF / WBC >50   Sq Epi: x / Non Sq Epi: Many / Bacteria: TNTC      CAPILLARY BLOOD GLUCOSE  165 (2017 11:47)  133 (2017 23:23)      RADIOLOGY & ADDITIONAL TESTS:    Imaging Personally Reviewed:  [ ] YES  [ ] NO    Consultant(s) Notes Reviewed:  [ ] YES  [ ] NO    Care Discussed with Consultants/Other Providers [ ] YES  [ ] NO

## 2017-01-21 DIAGNOSIS — C64.1 MALIGNANT NEOPLASM OF RIGHT KIDNEY, EXCEPT RENAL PELVIS: ICD-10-CM

## 2017-01-21 LAB
ANION GAP SERPL CALC-SCNC: 10 MMOL/L — SIGNIFICANT CHANGE UP (ref 5–17)
BUN SERPL-MCNC: 21 MG/DL — SIGNIFICANT CHANGE UP (ref 7–23)
CALCIUM SERPL-MCNC: 7.8 MG/DL — LOW (ref 8.5–10.1)
CHLORIDE SERPL-SCNC: 99 MMOL/L — SIGNIFICANT CHANGE UP (ref 96–108)
CO2 SERPL-SCNC: 29 MMOL/L — SIGNIFICANT CHANGE UP (ref 22–31)
CREAT SERPL-MCNC: 4.12 MG/DL — HIGH (ref 0.5–1.3)
GLUCOSE SERPL-MCNC: 215 MG/DL — HIGH (ref 70–99)
HAV IGM SER-ACNC: SIGNIFICANT CHANGE UP
HBV CORE IGM SER-ACNC: SIGNIFICANT CHANGE UP
HBV SURFACE AB SER-ACNC: SIGNIFICANT CHANGE UP
HBV SURFACE AG SER-ACNC: SIGNIFICANT CHANGE UP
HCT VFR BLD CALC: 27.2 % — LOW (ref 39–50)
HCV AB S/CO SERPL IA: 0.28 S/CO — SIGNIFICANT CHANGE UP
HCV AB SERPL-IMP: SIGNIFICANT CHANGE UP
HGB BLD-MCNC: 9 G/DL — LOW (ref 13–17)
MCHC RBC-ENTMCNC: 30.5 PG — SIGNIFICANT CHANGE UP (ref 27–34)
MCHC RBC-ENTMCNC: 33.2 GM/DL — SIGNIFICANT CHANGE UP (ref 32–36)
MCV RBC AUTO: 91.6 FL — SIGNIFICANT CHANGE UP (ref 80–100)
PLATELET # BLD AUTO: 193 K/UL — SIGNIFICANT CHANGE UP (ref 150–400)
POTASSIUM SERPL-MCNC: 3.2 MMOL/L — LOW (ref 3.5–5.3)
POTASSIUM SERPL-SCNC: 3.2 MMOL/L — LOW (ref 3.5–5.3)
RBC # BLD: 2.96 M/UL — LOW (ref 4.2–5.8)
RBC # FLD: 13.4 % — SIGNIFICANT CHANGE UP (ref 11–15)
SODIUM SERPL-SCNC: 138 MMOL/L — SIGNIFICANT CHANGE UP (ref 135–145)
WBC # BLD: 9.6 K/UL — SIGNIFICANT CHANGE UP (ref 3.8–10.5)
WBC # FLD AUTO: 9.6 K/UL — SIGNIFICANT CHANGE UP (ref 3.8–10.5)

## 2017-01-21 RX ADMIN — LEVETIRACETAM 430 MILLIGRAM(S): 250 TABLET, FILM COATED ORAL at 06:31

## 2017-01-21 RX ADMIN — Medication 667 MILLIGRAM(S): at 17:39

## 2017-01-21 RX ADMIN — HEPARIN SODIUM 5000 UNIT(S): 5000 INJECTION INTRAVENOUS; SUBCUTANEOUS at 06:32

## 2017-01-21 RX ADMIN — Medication 1 TABLET(S): at 13:44

## 2017-01-21 RX ADMIN — OXCARBAZEPINE 600 MILLIGRAM(S): 300 TABLET, FILM COATED ORAL at 06:32

## 2017-01-21 RX ADMIN — ALBUTEROL 1.25 MILLIGRAM(S): 90 AEROSOL, METERED ORAL at 06:14

## 2017-01-21 RX ADMIN — SIMVASTATIN 20 MILLIGRAM(S): 20 TABLET, FILM COATED ORAL at 21:47

## 2017-01-21 RX ADMIN — HEPARIN SODIUM 5000 UNIT(S): 5000 INJECTION INTRAVENOUS; SUBCUTANEOUS at 17:39

## 2017-01-21 RX ADMIN — ALBUTEROL 1.25 MILLIGRAM(S): 90 AEROSOL, METERED ORAL at 00:20

## 2017-01-21 RX ADMIN — ALBUTEROL 1.25 MILLIGRAM(S): 90 AEROSOL, METERED ORAL at 18:18

## 2017-01-21 RX ADMIN — OXCARBAZEPINE 600 MILLIGRAM(S): 300 TABLET, FILM COATED ORAL at 17:39

## 2017-01-21 RX ADMIN — Medication 2: at 13:45

## 2017-01-21 RX ADMIN — ALBUTEROL 1.25 MILLIGRAM(S): 90 AEROSOL, METERED ORAL at 23:22

## 2017-01-21 RX ADMIN — ALBUTEROL 1.25 MILLIGRAM(S): 90 AEROSOL, METERED ORAL at 11:56

## 2017-01-21 RX ADMIN — LEVETIRACETAM 430 MILLIGRAM(S): 250 TABLET, FILM COATED ORAL at 17:39

## 2017-01-21 RX ADMIN — Medication 1 TABLET(S): at 17:39

## 2017-01-21 RX ADMIN — Medication 1 TABLET(S): at 13:45

## 2017-01-21 RX ADMIN — Medication 667 MILLIGRAM(S): at 13:45

## 2017-01-21 RX ADMIN — Medication 1: at 17:39

## 2017-01-21 RX ADMIN — AMLODIPINE BESYLATE 10 MILLIGRAM(S): 2.5 TABLET ORAL at 06:32

## 2017-01-21 RX ADMIN — CEFTRIAXONE 100 GRAM(S): 500 INJECTION, POWDER, FOR SOLUTION INTRAMUSCULAR; INTRAVENOUS at 13:56

## 2017-01-21 NOTE — PROGRESS NOTE ADULT - SUBJECTIVE AND OBJECTIVE BOX
Patient is a 61y old  Male who presents with a chief complaint of     INTERVAL HPI/OVERNIGHT EVENTS:  Urine culture: + klebsiella pneumoniae sensitive to ceftriaxone therapy    urine culture: greater than 100,000 cfu gram negative rods  sensitivity and specificity pending  afebrile      MEDICATIONS  (STANDING):  heparin  Injectable 5000Unit(s) SubCutaneous every 12 hours  OXcarbazepine 600milliGRAM(s) Oral two times a day  simvastatin 20milliGRAM(s) Oral at bedtime  calcium acetate 667milliGRAM(s) Oral three times a day with meals  lactobacillus acidophilus 1Tablet(s) Oral two times a day with meals  multivitamin 1Tablet(s) Oral daily  amLODIPine   Tablet 10milliGRAM(s) Oral daily  ALBUTerol   0.042% 1.25milliGRAM(s) Nebulizer every 6 hours  insulin lispro (HumaLOG) corrective regimen sliding scale  SubCutaneous every 6 hours  dextrose 5%. 1000milliLiter(s) IV Continuous <Continuous>  dextrose 50% Injectable 12.5Gram(s) IV Push once  dextrose 50% Injectable 25Gram(s) IV Push once  dextrose 50% Injectable 25Gram(s) IV Push once  levETIRAcetam  IVPB 750milliGRAM(s) IV Intermittent every 12 hours  cefTRIAXone   IVPB 1Gram(s) IV Intermittent every 24 hours  dextrose 5% + sodium chloride 0.45%. 1000milliLiter(s) IV Continuous <Continuous>    MEDICATIONS  (PRN):  acetaminophen  Suppository 650milliGRAM(s) Rectal every 6 hours PRN For Temp greater than 38 C (100.4 F)  dextrose Gel 1Dose(s) Oral once PRN Blood Glucose LESS THAN 70 milliGRAM(s)/deciliter  glucagon  Injectable 1milliGRAM(s) IntraMuscular once PRN Glucose LESS THAN 70 milligrams/deciliter        REVIEW OF SYSTEMS:  unable to contribute  Vital Signs Last 24 Hrs  T(C): 36.7, Max: 37.7 ( @ 18:04)  T(F): 98, Max: 99.9 ( @ 18:04)  HR: 77 (70 - 77)  BP: 129/78 (127/67 - 135/78)  BP(mean): 87 (87 - 87)  RR: 18 (13 - 20)  SpO2: 97% (95% - 98%)    PHYSICAL EXAM:  GENERAL: NAD, well-groomed, well-developed  HEAD:  Atraumatic, Normocephalic  EYES: EOMI, PERRLA, conjunctiva and sclera clear  ENMT: No tonsillar erythema, exudates, or enlargement; Moist mucous membranes   NECK: Supple, No JVD   NERVOUS SYSTEM:  Alert & Oriented X3, Good concentration; Motor Strength 5/5 B/L upper and lower extremities; DTRs 2+ intact and symmetric  CHEST/LUNG: Clear to percussion bilaterally; No rales, rhonchi, wheezing, or rubs. left CW permacath  HEART: Regular rate and rhythm; No murmurs, rubs, or gallops  ABDOMEN: Soft, Nontender, Nondistended; Bowel sounds present. PEG in place clogged  EXTREMITIES:  muscle wasting visible in bilateral lower extremities  LYMPH: No lymphadenopathy noted  SKIN: No rashes or lesions    LABS:                        9.9    14.6  )-----------( 226      ( 2017 15:59 )             30.9     2017 15:59    139    |  95     |  26     ----------------------------<  134    4.0     |  33     |  4.42     Ca    8.9        2017 15:59    TPro  7.2    /  Alb  2.0    /  TBili  0.3    /  DBili  x      /  AST  22     /  ALT  14     /  AlkPhos  73     2017 15:59      Urinalysis Basic - ( 2017 16:03 )    Color: Yellow / Appearance: very cloudy / S.010 / pH: x  Gluc: x / Ketone: Trace  / Bili: Negative / Urobili: Negative mg/dL   Blood: x / Protein: 500 mg/dL / Nitrite: Negative   Leuk Esterase: Moderate / RBC: 3-5 /HPF / WBC >50   Sq Epi: x / Non Sq Epi: Many / Bacteria: TNTC      CAPILLARY BLOOD GLUCOSE  165 (2017 11:47)  133 (2017 23:23)      RADIOLOGY & ADDITIONAL TESTS:    Imaging Personally Reviewed:  [ ] YES  [ ] NO    Consultant(s) Notes Reviewed:  [ ] YES  [ ] NO    Care Discussed with Consultants/Other Providers [ ] YES  [ ] NO

## 2017-01-21 NOTE — DIETITIAN INITIAL EVALUATION ADULT. - PERTINENT MEDS FT
Heparin, Rocephin, 5% dextrose, Norvasc, Keppra, Phoslo, Humalog sliding scale, Bacid, MVI, Trileptal, Zocor

## 2017-01-21 NOTE — PROGRESS NOTE ADULT - SUBJECTIVE AND OBJECTIVE BOX
Peg changed at bedside yesterday. Probably due candace age of old peg tube - bumper from old tube broke off - this will likely pass on its own but will need to follow with occasional KUB      MEDICATIONS  (STANDING):  heparin  Injectable 5000Unit(s) SubCutaneous every 12 hours  OXcarbazepine 600milliGRAM(s) Oral two times a day  simvastatin 20milliGRAM(s) Oral at bedtime  calcium acetate 667milliGRAM(s) Oral three times a day with meals  lactobacillus acidophilus 1Tablet(s) Oral two times a day with meals  multivitamin 1Tablet(s) Oral daily  amLODIPine   Tablet 10milliGRAM(s) Oral daily  ALBUTerol   0.042% 1.25milliGRAM(s) Nebulizer every 6 hours  insulin lispro (HumaLOG) corrective regimen sliding scale  SubCutaneous every 6 hours  dextrose 5%. 1000milliLiter(s) IV Continuous <Continuous>  dextrose 50% Injectable 12.5Gram(s) IV Push once  dextrose 50% Injectable 25Gram(s) IV Push once  dextrose 50% Injectable 25Gram(s) IV Push once  levETIRAcetam  IVPB 750milliGRAM(s) IV Intermittent every 12 hours  cefTRIAXone   IVPB 1Gram(s) IV Intermittent every 24 hours  dextrose 5%. 1000milliLiter(s) IV Continuous <Continuous>    MEDICATIONS  (PRN):  acetaminophen  Suppository 650milliGRAM(s) Rectal every 6 hours PRN For Temp greater than 38 C (100.4 F)  dextrose Gel 1Dose(s) Oral once PRN Blood Glucose LESS THAN 70 milliGRAM(s)/deciliter  glucagon  Injectable 1milliGRAM(s) IntraMuscular once PRN Glucose LESS THAN 70 milligrams/deciliter      Allergies    Allergy Status Unknown    Intolerances        Vital Signs Last 24 Hrs  T(C): 37.1, Max: 37.7 (01-20 @ 23:26)  T(F): 98.8, Max: 99.9 (01-20 @ 23:26)  HR: 79 (75 - 106)  BP: 115/69 (97/60 - 156/84)  BP(mean): --  RR: 16 (16 - 18)  SpO2: 97% (97% - 100%)    PHYSICAL EXAM:  General: NAD.  CVS: S1, S2  Chest: air entry bilaterally present  Abd: BS present, soft, non-tender      LABS:                        9.0    9.6   )-----------( 193      ( 21 Jan 2017 16:07 )             27.2     21 Jan 2017 16:07    138    |  99     |  21     ----------------------------<  215    3.2     |  29     |  4.12     Ca    7.8        21 Jan 2017 16:07        Pt taking PO diet  ORLY in AM

## 2017-01-21 NOTE — DIETITIAN INITIAL EVALUATION ADULT. - NS AS NUTRI INTERV COLLABORAT
Collaboration with other providers/Swallow evaluation to confirm appropriate consistency food/liquids

## 2017-01-21 NOTE — DIETITIAN INITIAL EVALUATION ADULT. - NUTRITION INTERVENTION
Vitamin/Meals and Snack/Nutrition Education/Collaboration and Referral of Nutrition Care/Medical Food Supplements

## 2017-01-21 NOTE — CHART NOTE - NSCHARTNOTEFT_GEN_A_CORE
Upon Nutritional Assessment by the Registered Dietitian your patient was determined to meet criteria / has evidence of the following diagnosis/diagnoses:          [ ]  Mild Protein Calorie Malnutrition        [ ]  Moderate Protein Calorie Malnutrition        [ ] Severe Protein Calorie Malnutrition        [ ] Unspecified Protein Calorie Malnutrition        [x ] Underweight / BMI <19        [ ] Morbid Obesity / BMI > 40      Findings as based on:  •  Comprehensive nutrition assessment and consultation  •  Calorie counts (nutrient intake analysis)  •  Food acceptance and intake status from observations by staff  •  Follow up  •  Patient education  •  Intervention secondary to interdisciplinary rounds  •   concerns      Treatment:    The following diet has been recommended: Dysphagia 1-pureed/thin liquids; CCHO Renal no snack c Nepro x 1/day (provides 425 kcal, 19 g protein)      PROVIDER Section:     By signing this assessment you are acknowledging and agree with the diagnosis/diagnoses assigned by the Registered Dietitian    Comments:

## 2017-01-21 NOTE — DIETITIAN INITIAL EVALUATION ADULT. - PERTINENT LABORATORY DATA
01-20 Na137 mmol/L Glu 208 mg/dL<H> K+ 3.6 mmol/L Cr  5.48 mg/dL<H> BUN 35 mg/dL<H> Phos n/a   Alb n/a , PAB n/a; (1/18) Alb 2.0L

## 2017-01-21 NOTE — DIETITIAN INITIAL EVALUATION ADULT. - NS AS NUTRI INTERV MEALS SNACK
Texture-modified diet/Carbohydrate - modified diet/Diets modified for specific foods and ingredients/Continue c altered consistency (Dysphagia 1- pureed/thin liquids); add CCHO Renal c snack diet restrictions Diets modified for specific foods and ingredients/Continue c altered consistency (Dysphagia 1- pureed/thin liquids); add CCHO Renal no snack diet restrictions/Carbohydrate - modified diet/Texture-modified diet

## 2017-01-21 NOTE — DIETITIAN INITIAL EVALUATION ADULT. - ENERGY NEEDS
Height (cm): 160 (01-18)  Weight (kg): 48.2 (01-21)  BMI (kg/m2): 18.8 (01-21)  IBW: 56.4 kg +/- 10%    % IBW:  85%     UBW:  unknown     %UBW: unknown

## 2017-01-21 NOTE — DIETITIAN INITIAL EVALUATION ADULT. - OTHER INFO
Pt seen for consult - PU, dialysis, chew/swallowing difficulty, enteral nutrition. Unable to interview pt; non-verbal. Per discussion c pt sister who was at bedside feeding pt breakfast, pt lives c family; EG remains for emergency use; at home fed pureed diet/thin liquids; takes Nepro x 1/day. Family reports wt loss but cannot supply specifics on amount of loss or time frame. Pt c DM; BG levels controlled c diet alone; no DM meds. No reports of N/V/C/D; BM regular; last x 1 (1/21). Pt seen for consult - PU, dialysis, chew/swallowing difficulty, enteral nutrition. Unable to interview pt; non-verbal. Per discussion c pt sister (caregiver) who was at bedside feeding pt breakfast, PEG remains for emergency use; at home fed pureed diet/thin liquids; takes Nepro x 1/day. Pt has [facility] assistance (10 hrs x 5 days/wk; 6 hrs x 2 days/wk). Sister reports wt loss but cannot supply specifics on amount of loss or time frame. Pt c DM; BG levels controlled c diet alone; no DM meds. No reports of N/V/C/D; BM regular; last x 1 (1/21).

## 2017-01-22 DIAGNOSIS — M79.89 OTHER SPECIFIED SOFT TISSUE DISORDERS: ICD-10-CM

## 2017-01-22 LAB — HBA1C BLD-MCNC: 5.6 % — SIGNIFICANT CHANGE UP (ref 4–5.6)

## 2017-01-22 PROCEDURE — 74000: CPT | Mod: 26

## 2017-01-22 RX ORDER — SENNA PLUS 8.6 MG/1
10 TABLET ORAL AT BEDTIME
Qty: 0 | Refills: 0 | Status: DISCONTINUED | OUTPATIENT
Start: 2017-01-22 | End: 2017-01-23

## 2017-01-22 RX ADMIN — Medication 2: at 13:34

## 2017-01-22 RX ADMIN — ALBUTEROL 1.25 MILLIGRAM(S): 90 AEROSOL, METERED ORAL at 11:24

## 2017-01-22 RX ADMIN — AMLODIPINE BESYLATE 10 MILLIGRAM(S): 2.5 TABLET ORAL at 06:10

## 2017-01-22 RX ADMIN — HEPARIN SODIUM 5000 UNIT(S): 5000 INJECTION INTRAVENOUS; SUBCUTANEOUS at 17:53

## 2017-01-22 RX ADMIN — Medication 667 MILLIGRAM(S): at 17:53

## 2017-01-22 RX ADMIN — Medication 1 TABLET(S): at 13:34

## 2017-01-22 RX ADMIN — HEPARIN SODIUM 5000 UNIT(S): 5000 INJECTION INTRAVENOUS; SUBCUTANEOUS at 06:10

## 2017-01-22 RX ADMIN — LEVETIRACETAM 430 MILLIGRAM(S): 250 TABLET, FILM COATED ORAL at 06:10

## 2017-01-22 RX ADMIN — LEVETIRACETAM 430 MILLIGRAM(S): 250 TABLET, FILM COATED ORAL at 17:53

## 2017-01-22 RX ADMIN — SIMVASTATIN 20 MILLIGRAM(S): 20 TABLET, FILM COATED ORAL at 22:09

## 2017-01-22 RX ADMIN — OXCARBAZEPINE 600 MILLIGRAM(S): 300 TABLET, FILM COATED ORAL at 06:10

## 2017-01-22 RX ADMIN — ALBUTEROL 1.25 MILLIGRAM(S): 90 AEROSOL, METERED ORAL at 17:13

## 2017-01-22 RX ADMIN — Medication 1 TABLET(S): at 17:53

## 2017-01-22 RX ADMIN — SENNA PLUS 10 MILLILITER(S): 8.6 TABLET ORAL at 22:10

## 2017-01-22 RX ADMIN — Medication 667 MILLIGRAM(S): at 13:34

## 2017-01-22 RX ADMIN — ALBUTEROL 1.25 MILLIGRAM(S): 90 AEROSOL, METERED ORAL at 05:44

## 2017-01-22 RX ADMIN — ALBUTEROL 1.25 MILLIGRAM(S): 90 AEROSOL, METERED ORAL at 23:26

## 2017-01-22 RX ADMIN — CEFTRIAXONE 100 GRAM(S): 500 INJECTION, POWDER, FOR SOLUTION INTRAMUSCULAR; INTRAVENOUS at 14:24

## 2017-01-22 RX ADMIN — Medication 667 MILLIGRAM(S): at 08:20

## 2017-01-22 RX ADMIN — OXCARBAZEPINE 600 MILLIGRAM(S): 300 TABLET, FILM COATED ORAL at 17:53

## 2017-01-22 RX ADMIN — Medication 1 TABLET(S): at 08:20

## 2017-01-22 NOTE — PROGRESS NOTE ADULT - PROBLEM SELECTOR PROBLEM 7
Urinary tract infection with hematuria, site unspecified

## 2017-01-22 NOTE — PROGRESS NOTE ADULT - PROBLEM SELECTOR PLAN 3
resume antiepileptic drugs

## 2017-01-22 NOTE — PROGRESS NOTE ADULT - SUBJECTIVE AND OBJECTIVE BOX
Patient is a 61y old  Male who presents with a chief complaint of     INTERVAL HPI/OVERNIGHT EVENTS:  Stable  KUB done today: large amount of stool and contrast material throughout colon and rectum  Will add senna  Gastroenterology follow up noted  Will repeat KUB in 2 days   Urine culture: + klebsiella pneumoniae sensitive to ceftriaxone therapy  Remains afebrile      MEDICATIONS  (STANDING):  heparin  Injectable 5000Unit(s) SubCutaneous every 12 hours  OXcarbazepine 600milliGRAM(s) Oral two times a day  simvastatin 20milliGRAM(s) Oral at bedtime  calcium acetate 667milliGRAM(s) Oral three times a day with meals  lactobacillus acidophilus 1Tablet(s) Oral two times a day with meals  multivitamin 1Tablet(s) Oral daily  amLODIPine   Tablet 10milliGRAM(s) Oral daily  ALBUTerol   0.042% 1.25milliGRAM(s) Nebulizer every 6 hours  insulin lispro (HumaLOG) corrective regimen sliding scale  SubCutaneous every 6 hours  dextrose 5%. 1000milliLiter(s) IV Continuous <Continuous>  dextrose 50% Injectable 12.5Gram(s) IV Push once  dextrose 50% Injectable 25Gram(s) IV Push once  dextrose 50% Injectable 25Gram(s) IV Push once  levETIRAcetam  IVPB 750milliGRAM(s) IV Intermittent every 12 hours  cefTRIAXone   IVPB 1Gram(s) IV Intermittent every 24 hours  dextrose 5% + sodium chloride 0.45%. 1000milliLiter(s) IV Continuous <Continuous>    MEDICATIONS  (PRN):  acetaminophen  Suppository 650milliGRAM(s) Rectal every 6 hours PRN For Temp greater than 38 C (100.4 F)  dextrose Gel 1Dose(s) Oral once PRN Blood Glucose LESS THAN 70 milliGRAM(s)/deciliter  glucagon  Injectable 1milliGRAM(s) IntraMuscular once PRN Glucose LESS THAN 70 milligrams/deciliter        REVIEW OF SYSTEMS:  unable to contribute  Vital Signs Last 24 Hrs  T(C): 36.7, Max: 37.7 ( @ 18:04)  T(F): 98, Max: 99.9 ( @ 18:04)  HR: 77 (70 - 77)  BP: 129/78 (127/67 - 135/78)  BP(mean): 87 (87 - 87)  RR: 18 (13 - 20)  SpO2: 97% (95% - 98%)    PHYSICAL EXAM:  GENERAL: NAD, well-groomed, well-developed  HEAD:  Atraumatic, Normocephalic  EYES: EOMI, PERRLA, conjunctiva and sclera clear  ENMT: No tonsillar erythema, exudates, or enlargement; Moist mucous membranes   NECK: Supple, No JVD   NERVOUS SYSTEM:  Alert & Oriented X3, Good concentration; Motor Strength 5/5 B/L upper and lower extremities; DTRs 2+ intact and symmetric  CHEST/LUNG: Clear to percussion bilaterally; No rales, rhonchi, wheezing, or rubs. left CW permacath  HEART: Regular rate and rhythm; No murmurs, rubs, or gallops  ABDOMEN: Soft, Nontender, Nondistended; Bowel sounds present. PEG in place clogged  EXTREMITIES:  muscle wasting visible in bilateral lower extremities  LYMPH: No lymphadenopathy noted  SKIN: No rashes or lesions    LABS:                        9.9    14.6  )-----------( 226      ( 2017 15:59 )             30.9     2017 15:59    139    |  95     |  26     ----------------------------<  134    4.0     |  33     |  4.42     Ca    8.9        2017 15:59    TPro  7.2    /  Alb  2.0    /  TBili  0.3    /  DBili  x      /  AST  22     /  ALT  14     /  AlkPhos  73     2017 15:59      Urinalysis Basic - ( 2017 16:03 )    Color: Yellow / Appearance: very cloudy / S.010 / pH: x  Gluc: x / Ketone: Trace  / Bili: Negative / Urobili: Negative mg/dL   Blood: x / Protein: 500 mg/dL / Nitrite: Negative   Leuk Esterase: Moderate / RBC: 3-5 /HPF / WBC >50   Sq Epi: x / Non Sq Epi: Many / Bacteria: TNTC      CAPILLARY BLOOD GLUCOSE  165 (2017 11:47)  133 (2017 23:23)      RADIOLOGY & ADDITIONAL TESTS:    Imaging Personally Reviewed:  [ ] YES  [ ] NO    Consultant(s) Notes Reviewed:  [ ] YES  [ ] NO    Care Discussed with Consultants/Other Providers [ ] YES  [ ] NO Patient is a 61y old  Male who presents with a chief complaint of     INTERVAL HPI/OVERNIGHT EVENTS:  Stable  KUB done today: large amount of stool and contrast material throughout colon and rectum  Will add senna  Gastroenterology follow up noted  Will repeat KUB in 2 days   Urine culture: + klebsiella pneumoniae sensitive to ceftriaxone therapy  Sister at bedtime reports observed left arm to be swollen  Decrease passive ROM noted to left arm  Remains afebrile      MEDICATIONS  (STANDING):  heparin  Injectable 5000Unit(s) SubCutaneous every 12 hours  OXcarbazepine 600milliGRAM(s) Oral two times a day  simvastatin 20milliGRAM(s) Oral at bedtime  calcium acetate 667milliGRAM(s) Oral three times a day with meals  lactobacillus acidophilus 1Tablet(s) Oral two times a day with meals  multivitamin 1Tablet(s) Oral daily  amLODIPine   Tablet 10milliGRAM(s) Oral daily  ALBUTerol   0.042% 1.25milliGRAM(s) Nebulizer every 6 hours  insulin lispro (HumaLOG) corrective regimen sliding scale  SubCutaneous every 6 hours  dextrose 5%. 1000milliLiter(s) IV Continuous <Continuous>  dextrose 50% Injectable 12.5Gram(s) IV Push once  dextrose 50% Injectable 25Gram(s) IV Push once  dextrose 50% Injectable 25Gram(s) IV Push once  levETIRAcetam  IVPB 750milliGRAM(s) IV Intermittent every 12 hours  cefTRIAXone   IVPB 1Gram(s) IV Intermittent every 24 hours  dextrose 5% + sodium chloride 0.45%. 1000milliLiter(s) IV Continuous <Continuous>    MEDICATIONS  (PRN):  acetaminophen  Suppository 650milliGRAM(s) Rectal every 6 hours PRN For Temp greater than 38 C (100.4 F)  dextrose Gel 1Dose(s) Oral once PRN Blood Glucose LESS THAN 70 milliGRAM(s)/deciliter  glucagon  Injectable 1milliGRAM(s) IntraMuscular once PRN Glucose LESS THAN 70 milligrams/deciliter        REVIEW OF SYSTEMS:  unable to contribute  Vital Signs Last 24 Hrs  T(C): 36.7, Max: 37.7 ( @ 18:04)  T(F): 98, Max: 99.9 ( @ 18:04)  HR: 77 (70 - 77)  BP: 129/78 (127/67 - 135/78)  BP(mean): 87 (87 - 87)  RR: 18 (13 - 20)  SpO2: 97% (95% - 98%)    PHYSICAL EXAM:  GENERAL: NAD, well-groomed, well-developed  HEAD:  Atraumatic, Normocephalic  EYES: EOMI, PERRLA, conjunctiva and sclera clear  ENMT: No tonsillar erythema, exudates, or enlargement; Moist mucous membranes   NECK: Supple, No JVD   NERVOUS SYSTEM:  Alert & Oriented X3, Good concentration; Motor Strength 5/5 B/L upper and lower extremities; DTRs 2+ intact and symmetric  CHEST/LUNG: Clear to percussion bilaterally; No rales, rhonchi, wheezing, or rubs. left CW permacath  HEART: Regular rate and rhythm; No murmurs, rubs, or gallops  ABDOMEN: Soft, Nontender, Nondistended; Bowel sounds present. PEG in place clogged  EXTREMITIES:  muscle wasting visible in bilateral lower extremities  LYMPH: No lymphadenopathy noted  SKIN: No rashes or lesions    LABS:                        9.9    14.6  )-----------( 226      ( 2017 15:59 )             30.9     2017 15:59    139    |  95     |  26     ----------------------------<  134    4.0     |  33     |  4.42     Ca    8.9        2017 15:59    TPro  7.2    /  Alb  2.0    /  TBili  0.3    /  DBili  x      /  AST  22     /  ALT  14     /  AlkPhos  73     2017 15:59      Urinalysis Basic - ( 2017 16:03 )    Color: Yellow / Appearance: very cloudy / S.010 / pH: x  Gluc: x / Ketone: Trace  / Bili: Negative / Urobili: Negative mg/dL   Blood: x / Protein: 500 mg/dL / Nitrite: Negative   Leuk Esterase: Moderate / RBC: 3-5 /HPF / WBC >50   Sq Epi: x / Non Sq Epi: Many / Bacteria: TNTC      CAPILLARY BLOOD GLUCOSE  165 (2017 11:47)  133 (2017 23:23)      RADIOLOGY & ADDITIONAL TESTS:    Imaging Personally Reviewed:  [ ] YES  [ ] NO    Consultant(s) Notes Reviewed:  [ ] YES  [ ] NO    Care Discussed with Consultants/Other Providers [ ] YES  [ ] NO

## 2017-01-22 NOTE — PROGRESS NOTE ADULT - PROBLEM SELECTOR PLAN 10
Nephrectomy this admission?   Discuss with family/attending  Venous ultrasound left upper ext:   Keep arm elevated

## 2017-01-22 NOTE — PROGRESS NOTE ADULT - PROBLEM SELECTOR PLAN 5
likely secondary to infection/fever-- improved  treat cause
likely secondary to infection/fever  treat cause

## 2017-01-22 NOTE — PROGRESS NOTE ADULT - SUBJECTIVE AND OBJECTIVE BOX
Pt stable - no abdominal pain      MEDICATIONS  (STANDING):  heparin  Injectable 5000Unit(s) SubCutaneous every 12 hours  OXcarbazepine 600milliGRAM(s) Oral two times a day  simvastatin 20milliGRAM(s) Oral at bedtime  calcium acetate 667milliGRAM(s) Oral three times a day with meals  lactobacillus acidophilus 1Tablet(s) Oral two times a day with meals  multivitamin 1Tablet(s) Oral daily  amLODIPine   Tablet 10milliGRAM(s) Oral daily  ALBUTerol   0.042% 1.25milliGRAM(s) Nebulizer every 6 hours  insulin lispro (HumaLOG) corrective regimen sliding scale  SubCutaneous every 6 hours  dextrose 5%. 1000milliLiter(s) IV Continuous <Continuous>  dextrose 50% Injectable 12.5Gram(s) IV Push once  dextrose 50% Injectable 25Gram(s) IV Push once  dextrose 50% Injectable 25Gram(s) IV Push once  levETIRAcetam  IVPB 750milliGRAM(s) IV Intermittent every 12 hours  cefTRIAXone   IVPB 1Gram(s) IV Intermittent every 24 hours  dextrose 5%. 1000milliLiter(s) IV Continuous <Continuous>  senna Syrup 10milliLiter(s) Oral at bedtime    MEDICATIONS  (PRN):  acetaminophen  Suppository 650milliGRAM(s) Rectal every 6 hours PRN For Temp greater than 38 C (100.4 F)  dextrose Gel 1Dose(s) Oral once PRN Blood Glucose LESS THAN 70 milliGRAM(s)/deciliter  glucagon  Injectable 1milliGRAM(s) IntraMuscular once PRN Glucose LESS THAN 70 milligrams/deciliter      Allergies    Allergy Status Unknown    Intolerances        Vital Signs Last 24 Hrs  T(C): 36.1, Max: 37.1 (01-21 @ 16:45)  T(F): 97, Max: 98.8 (01-21 @ 16:45)  HR: 78 (77 - 97)  BP: 109/64 (109/64 - 115/69)  BP(mean): --  RR: 16 (16 - 16)  SpO2: 96% (95% - 97%)    PHYSICAL EXAM:  General: NAD.  CVS: S1, S2  Chest: air entry bilaterally present  Abd: BS present, soft, non-tender      LABS:                        9.0    9.6   )-----------( 193      ( 21 Jan 2017 16:07 )             27.2     21 Jan 2017 16:07    138    |  99     |  21     ----------------------------<  215    3.2     |  29     |  4.12     Ca    7.8        21 Jan 2017 16:07      IMPRESSION - KUB  Gastrostomy tube projects over the region of the stomach. Prominent loops   of small and large bowel. Large amount of stool and enteric contrast   throughout the colon and rectum. No definite radiopaque foreignbody.   Partially imaged central venous catheter overlying the superior   cavoatrial region      Continue present diet  repeat KUB 2 days

## 2017-01-22 NOTE — PROGRESS NOTE ADULT - PROBLEM SELECTOR PLAN 6
black coloured tube. Family able to flush. May give meds/feeds via peg while lethargic  Clogged tube. GI follow up to replace
black coloured tube. Family able to flush. May give meds/feeds via peg while lethargic  Clogged tube. GI follow up to replace  Repeat KUB in 2 days (1/24/2017)
black coloured tube. Family able to flush. May give meds/feeds via peg while lethargic

## 2017-01-22 NOTE — PROGRESS NOTE ADULT - PROBLEM SELECTOR PLAN 8
Status: resolved   continue antibiotics  monitor cbc
continue antibiotics  monitor cbc
continue antibiotics  monitor cbc

## 2017-01-22 NOTE — PROGRESS NOTE ADULT - PROBLEM SELECTOR PLAN 4
Probably secondary to UTI  r/o Pneumonia/pleural effusion  r/o catheter sepsis-- resolving  follow up cultures  emperic antibiotics  follow up labs
Probably secondary to UTI  r/o Pneumonia/pleural effusion  r/o catheter sepsis-- resolving  follow up cultures  emperic antibiotics  follow up labs    BLOOD CULTURE: NO GROWTH TO DATE
Probably secondary to UTI  r/o Pneumonia/pleural effusion  r/o catheter sepsis  emperic antibiotics  follow up labs

## 2017-01-22 NOTE — PROGRESS NOTE ADULT - PROBLEM SELECTOR PROBLEM 5
Metabolic encephalopathy

## 2017-01-22 NOTE — PROGRESS NOTE ADULT - PROBLEM SELECTOR PROBLEM 6
PEG (percutaneous endoscopic gastrostomy) status

## 2017-01-23 VITALS — OXYGEN SATURATION: 95 %

## 2017-01-23 LAB
ANION GAP SERPL CALC-SCNC: 12 MMOL/L — SIGNIFICANT CHANGE UP (ref 5–17)
BUN SERPL-MCNC: 31 MG/DL — HIGH (ref 7–23)
CALCIUM SERPL-MCNC: 7.8 MG/DL — LOW (ref 8.5–10.1)
CHLORIDE SERPL-SCNC: 92 MMOL/L — LOW (ref 96–108)
CO2 SERPL-SCNC: 26 MMOL/L — SIGNIFICANT CHANGE UP (ref 22–31)
CREAT SERPL-MCNC: 5.88 MG/DL — HIGH (ref 0.5–1.3)
GLUCOSE SERPL-MCNC: 159 MG/DL — HIGH (ref 70–99)
HCT VFR BLD CALC: 25.9 % — LOW (ref 39–50)
HGB BLD-MCNC: 8.6 G/DL — LOW (ref 13–17)
MCHC RBC-ENTMCNC: 30.1 PG — SIGNIFICANT CHANGE UP (ref 27–34)
MCHC RBC-ENTMCNC: 33.4 GM/DL — SIGNIFICANT CHANGE UP (ref 32–36)
MCV RBC AUTO: 90.1 FL — SIGNIFICANT CHANGE UP (ref 80–100)
PLATELET # BLD AUTO: 224 K/UL — SIGNIFICANT CHANGE UP (ref 150–400)
POTASSIUM SERPL-MCNC: 3.3 MMOL/L — LOW (ref 3.5–5.3)
POTASSIUM SERPL-SCNC: 3.3 MMOL/L — LOW (ref 3.5–5.3)
RBC # BLD: 2.87 M/UL — LOW (ref 4.2–5.8)
RBC # FLD: 14.6 % — SIGNIFICANT CHANGE UP (ref 11–15)
SODIUM SERPL-SCNC: 130 MMOL/L — LOW (ref 135–145)
WBC # BLD: 10.9 K/UL — HIGH (ref 3.8–10.5)
WBC # FLD AUTO: 10.9 K/UL — HIGH (ref 3.8–10.5)

## 2017-01-23 PROCEDURE — 93971 EXTREMITY STUDY: CPT | Mod: 26,LT

## 2017-01-23 RX ORDER — SENNA PLUS 8.6 MG/1
10 TABLET ORAL
Qty: 300 | Refills: 0 | OUTPATIENT
Start: 2017-01-23 | End: 2017-02-22

## 2017-01-23 RX ORDER — POTASSIUM CHLORIDE 20 MEQ
20 PACKET (EA) ORAL ONCE
Qty: 0 | Refills: 0 | Status: COMPLETED | OUTPATIENT
Start: 2017-01-23 | End: 2017-01-23

## 2017-01-23 RX ORDER — ERYTHROPOIETIN 10000 [IU]/ML
20000 INJECTION, SOLUTION INTRAVENOUS; SUBCUTANEOUS
Qty: 0 | Refills: 0 | Status: COMPLETED | OUTPATIENT
Start: 2017-01-23 | End: 2017-01-23

## 2017-01-23 RX ADMIN — ALBUTEROL 1.25 MILLIGRAM(S): 90 AEROSOL, METERED ORAL at 12:54

## 2017-01-23 RX ADMIN — ALBUTEROL 1.25 MILLIGRAM(S): 90 AEROSOL, METERED ORAL at 17:43

## 2017-01-23 RX ADMIN — LEVETIRACETAM 430 MILLIGRAM(S): 250 TABLET, FILM COATED ORAL at 17:11

## 2017-01-23 RX ADMIN — Medication 667 MILLIGRAM(S): at 17:11

## 2017-01-23 RX ADMIN — Medication 1 TABLET(S): at 17:10

## 2017-01-23 RX ADMIN — HEPARIN SODIUM 5000 UNIT(S): 5000 INJECTION INTRAVENOUS; SUBCUTANEOUS at 17:11

## 2017-01-23 RX ADMIN — Medication 20 MILLIEQUIVALENT(S): at 17:11

## 2017-01-23 RX ADMIN — OXCARBAZEPINE 600 MILLIGRAM(S): 300 TABLET, FILM COATED ORAL at 06:44

## 2017-01-23 RX ADMIN — ERYTHROPOIETIN 20000 UNIT(S): 10000 INJECTION, SOLUTION INTRAVENOUS; SUBCUTANEOUS at 13:50

## 2017-01-23 RX ADMIN — ALBUTEROL 1.25 MILLIGRAM(S): 90 AEROSOL, METERED ORAL at 07:09

## 2017-01-23 RX ADMIN — LEVETIRACETAM 430 MILLIGRAM(S): 250 TABLET, FILM COATED ORAL at 06:44

## 2017-01-23 RX ADMIN — HEPARIN SODIUM 5000 UNIT(S): 5000 INJECTION INTRAVENOUS; SUBCUTANEOUS at 06:44

## 2017-01-23 RX ADMIN — AMLODIPINE BESYLATE 10 MILLIGRAM(S): 2.5 TABLET ORAL at 06:45

## 2017-01-23 RX ADMIN — OXCARBAZEPINE 600 MILLIGRAM(S): 300 TABLET, FILM COATED ORAL at 17:11

## 2017-01-23 NOTE — PROGRESS NOTE ADULT - PROBLEM SELECTOR PROBLEM 1
Mental retardation
Klebsiella cystitis
Klebsiella cystitis
Mental retardation

## 2017-01-23 NOTE — PROGRESS NOTE ADULT - PROBLEM SELECTOR PROBLEM 3
Leukocytosis, unspecified type
Seizure disorder
Leukocytosis, unspecified type
Seizure disorder

## 2017-01-23 NOTE — DISCHARGE NOTE ADULT - MEDICATION SUMMARY - MEDICATIONS TO TAKE
I will START or STAY ON the medications listed below when I get home from the hospital:    acetaminophen 325 mg oral tablet  -- 2 tab(s) by mouth every 6 hours, As needed, Mild Pain (1 - 3)  -- Indication: For Mild pain    acetaminophen 325 mg rectal suppository  -- 1 suppository(ies) rectally every 6 hours, As needed, For Temp greater than 38 C (100.4 F)  -- Indication: For Fever    cloNIDine 0.1 mg oral tablet  -- 1 tab(s) by mouth every 8 hours x 30 days  -- Indication: For Hypertension    levETIRAcetam 750 mg oral tablet  -- 1 tab(s) by mouth 2 times a day x 30 days  -- Indication: For Seizure disorder    OXcarbazepine 600 mg oral tablet  -- 1 tab(s) by mouth 2 times a day x 30 days  -- Indication: For Seizure disorder    simvastatin 20 mg oral tablet  -- 1 tab(s) by mouth once a day (at bedtime) x 30 days  -- Indication: For High cholesterol    risperiDONE 1 mg oral tablet  -- 1 tab(s) by mouth once a day x 30 days  -- Indication: For Mood symptoms    carvedilol 25 mg oral tablet  -- 1 tab(s) by mouth every 12 hours x 30 days  -- Indication: For Hypertension    amLODIPine 10 mg oral tablet  -- 1 tab(s) by mouth once a day x 30 days  -- Indication: For Hypertension    senna 8.8 mg/5 mL oral syrup  -- 10 milliliter(s) by mouth once a day (at bedtime) x 30 days, As Needed -for constipation  -- Indication: For Constipation    calcium acetate 667 mg oral capsule  -- 1 cap(s) by mouth 2 times a day x 30 days  -- Indication: For ESRD on dialysis    lactobacillus acidophilus oral capsule  -- 1 tab(s) by mouth 2 times a day x 30 days  -- Indication: For Promote healthy GI chuyita    levoFLOXacin 250 mg oral tablet  -- 1 tab(s) by mouth every 48 hours x 6 days  -- Indication: For Klebsiella cystitis    Nephro-Prosper Vitamin B Complex with C and Folic Acid oral tablet  -- 1 tab(s) by mouth once a day x 30 days  -- Indication: For ESRD on dialysis

## 2017-01-23 NOTE — PROGRESS NOTE ADULT - SUBJECTIVE AND OBJECTIVE BOX
No c/o abd pain; no evidence of obstruction      MEDICATIONS  (STANDING):  heparin  Injectable 5000Unit(s) SubCutaneous every 12 hours  OXcarbazepine 600milliGRAM(s) Oral two times a day  simvastatin 20milliGRAM(s) Oral at bedtime  calcium acetate 667milliGRAM(s) Oral three times a day with meals  lactobacillus acidophilus 1Tablet(s) Oral two times a day with meals  multivitamin 1Tablet(s) Oral daily  amLODIPine   Tablet 10milliGRAM(s) Oral daily  ALBUTerol   0.042% 1.25milliGRAM(s) Nebulizer every 6 hours  insulin lispro (HumaLOG) corrective regimen sliding scale  SubCutaneous every 6 hours  dextrose 5%. 1000milliLiter(s) IV Continuous <Continuous>  dextrose 50% Injectable 12.5Gram(s) IV Push once  dextrose 50% Injectable 25Gram(s) IV Push once  dextrose 50% Injectable 25Gram(s) IV Push once  levETIRAcetam  IVPB 750milliGRAM(s) IV Intermittent every 12 hours  cefTRIAXone   IVPB 1Gram(s) IV Intermittent every 24 hours  dextrose 5%. 1000milliLiter(s) IV Continuous <Continuous>  senna Syrup 10milliLiter(s) Oral at bedtime    MEDICATIONS  (PRN):  acetaminophen  Suppository 650milliGRAM(s) Rectal every 6 hours PRN For Temp greater than 38 C (100.4 F)  dextrose Gel 1Dose(s) Oral once PRN Blood Glucose LESS THAN 70 milliGRAM(s)/deciliter  glucagon  Injectable 1milliGRAM(s) IntraMuscular once PRN Glucose LESS THAN 70 milligrams/deciliter      Allergies    Allergy Status Unknown    Intolerances        Vital Signs Last 24 Hrs  T(C): 36.7, Max: 36.7 (01-22 @ 23:31)  T(F): 98, Max: 98 (01-22 @ 23:31)  HR: 71 (71 - 83)  BP: 151/82 (147/75 - 161/84)  BP(mean): --  RR: 16 (16 - 16)  SpO2: 100% (95% - 100%)    PHYSICAL EXAM:  General: NAD.  CVS: S1, S2  Chest: air entry bilaterally present  Abd: BS present, soft, non-tender, +PEG      LABS:                        9.0    9.6   )-----------( 193      ( 21 Jan 2017 16:07 )             27.2     21 Jan 2017 16:07    138    |  99     |  21     ----------------------------<  215    3.2     |  29     |  4.12     Ca    7.8        21 Jan 2017 16:07          continue feedings  no evidence of peg bumper in the stomach - peg bumper has likely passed

## 2017-01-23 NOTE — DISCHARGE NOTE ADULT - CARE PLAN
Principal Discharge DX:	Klebsiella cystitis  Goal:	Resolution of infection  Instructions for follow-up, activity and diet:	Continue Levofloxacin 250mg every 48 hours (give after dialysis)  Follow up with PCP  Secondary Diagnosis:	Acute pyelonephritis  Goal:	Resolution of infection  Instructions for follow-up, activity and diet:	Continue Levofloxacin 250mg every 48 hours (give after dialysis)  Follow up with PCP  Secondary Diagnosis:	Altered mental status  Goal:	Resolved  Instructions for follow-up, activity and diet:	Improved with treatment for UTI: klbesiella pneumoniae +  Secondary Diagnosis:	Dialysis patient  Goal:	Routine hemodialysis  Instructions for follow-up, activity and diet:	Continue hemodialysis as routinely scheduled  Secondary Diagnosis:	Leukocytosis, unspecified type  Goal:	Goal met  Instructions for follow-up, activity and diet:	Complete antibiotic therapy  Follow up with PCP to monitor CBC  Secondary Diagnosis:	Mental retardation  Goal:	Stable  Instructions for follow-up, activity and diet:	Routine follow up  Secondary Diagnosis:	PEG tube malfunction  Goal:	Goal met  Instructions for follow-up, activity and diet:	Seen by gastroenterology consult for tube exchange

## 2017-01-23 NOTE — DISCHARGE NOTE ADULT - HOSPITAL COURSE
62 y/o M with hx of MR, seizures, hld, cva with residual L sided weakness, ESRD on HD (tu, th, sat),with renal cancer thqt the family declines therapy for  presents with fever and altered mental status. patient's neice reports that he has been having fevers over the past 2 days, and increasingly lethargic over the past5 days.  patient had  dialysis last yesterday  and was subsequently sent to the emergency department. family noticed cough; no recent abd pain, vomiting; + loose, non bloody stools in the past 3 days. ROS otherwise negative      Fever has resolved  Pt is at baseline mental status  Treated with IV antibiotics for klebsiella pneumonia in urine culture  Blood culture shows no growth to date  Seen by G.I. consult for tube exchange 2nd to clogged peg tube

## 2017-01-23 NOTE — PROGRESS NOTE ADULT - SUBJECTIVE AND OBJECTIVE BOX
Patient is a 61y old  Male who presents with a chief complaint of fever    INTERVAL HPI / OVERNIGHT EVENTS:No fever ,more alert    MEDICATIONS  (STANDING):  heparin  Injectable 5000Unit(s) SubCutaneous every 12 hours  OXcarbazepine 600milliGRAM(s) Oral two times a day  simvastatin 20milliGRAM(s) Oral at bedtime  calcium acetate 667milliGRAM(s) Oral three times a day with meals  lactobacillus acidophilus 1Tablet(s) Oral two times a day with meals  multivitamin 1Tablet(s) Oral daily  amLODIPine   Tablet 10milliGRAM(s) Oral daily  ALBUTerol   0.042% 1.25milliGRAM(s) Nebulizer every 6 hours  insulin lispro (HumaLOG) corrective regimen sliding scale  SubCutaneous every 6 hours  dextrose 5%. 1000milliLiter(s) IV Continuous <Continuous>  dextrose 50% Injectable 12.5Gram(s) IV Push once  dextrose 50% Injectable 25Gram(s) IV Push once  dextrose 50% Injectable 25Gram(s) IV Push once  levETIRAcetam  IVPB 750milliGRAM(s) IV Intermittent every 12 hours  cefTRIAXone   IVPB 1Gram(s) IV Intermittent every 24 hours  dextrose 5%. 1000milliLiter(s) IV Continuous <Continuous>    MEDICATIONS  (PRN):  acetaminophen  Suppository 650milliGRAM(s) Rectal every 6 hours PRN For Temp greater than 38 C (100.4 F)  dextrose Gel 1Dose(s) Oral once PRN Blood Glucose LESS THAN 70 milliGRAM(s)/deciliter  glucagon  Injectable 1milliGRAM(s) IntraMuscular once PRN Glucose LESS THAN 70 milligrams/deciliter      Vital Signs Last 24 Hrs  Tmax.afebrile    PHYSICAL EXAM:    Constitutional: NAD, well-groomed, well-developed  Respiratory: CTAB/L  Cardiovascular: S1 and S2, RRR, no M/G/R  Gastrointestinal: BS+, soft, NT/ND  Extremities: No peripheral edema  Vascular: 2+ peripheral pulses  Neuro: more awake ,alert ,non verbal  Skin: No rashes      LABS:             WBC 10.9  Cr 5.8              MICROBIOLOGY:  RECENT CULTURES:  01-19 .Urine Catheterized Klebsiella pneumoniae XXXX   >100,000 CFU/ml Klebsiella pneumoniae    01-19 .Blood Blood-Peripheral XXXX XXXX   No growth to date.          RADIOLOGY & ADDITIONAL STUDIES:

## 2017-01-23 NOTE — PROGRESS NOTE ADULT - PROBLEM SELECTOR PLAN 2
stable.fingerstick with coverage
resolving, non verbal with dementia but more alert
stable.fingerstick with coverage
resolving, non verbal with dementia but more alert ,was very somnolent yesterday

## 2017-01-23 NOTE — PROGRESS NOTE ADULT - SUBJECTIVE AND OBJECTIVE BOX
Patient seen in follow up for ESRD; appears comfortable.    MEDICATIONS  (STANDING):  heparin  Injectable 5000Unit(s) SubCutaneous every 12 hours  OXcarbazepine 600milliGRAM(s) Oral two times a day  simvastatin 20milliGRAM(s) Oral at bedtime  calcium acetate 667milliGRAM(s) Oral three times a day with meals  lactobacillus acidophilus 1Tablet(s) Oral two times a day with meals  multivitamin 1Tablet(s) Oral daily  amLODIPine   Tablet 10milliGRAM(s) Oral daily  ALBUTerol   0.042% 1.25milliGRAM(s) Nebulizer every 6 hours  insulin lispro (HumaLOG) corrective regimen sliding scale  SubCutaneous every 6 hours  dextrose 5%. 1000milliLiter(s) IV Continuous <Continuous>  dextrose 50% Injectable 12.5Gram(s) IV Push once  dextrose 50% Injectable 25Gram(s) IV Push once  dextrose 50% Injectable 25Gram(s) IV Push once  levETIRAcetam  IVPB 750milliGRAM(s) IV Intermittent every 12 hours  dextrose 5%. 1000milliLiter(s) IV Continuous <Continuous>  senna Syrup 10milliLiter(s) Oral at bedtime  potassium chloride   Powder 20milliEquivalent(s) Oral once  levoFLOXacin  Tablet 250milliGRAM(s) Oral every 48 hours  epoetin agnes Injectable 22661Zeys(s) IV Push <User Schedule>    MEDICATIONS  (PRN):  acetaminophen  Suppository 650milliGRAM(s) Rectal every 6 hours PRN For Temp greater than 38 C (100.4 F)  dextrose Gel 1Dose(s) Oral once PRN Blood Glucose LESS THAN 70 milliGRAM(s)/deciliter  glucagon  Injectable 1milliGRAM(s) IntraMuscular once PRN Glucose LESS THAN 70 milligrams/deciliter    PHYSICAL EXAM:      T(C): 36.2, Max: 36.7 (01-22 @ 23:31)  HR: 101 (71 - 101)  BP: 128/72 (128/72 - 161/84)  RR: 16 (16 - 16)  SpO2: 94% (94% - 100%)  Wt(kg): --  Respiratory: clear anteriorly, decreased BS at bases  Cardiovascular: S1 S2  Gastrointestinal: soft NT ND +BS  Extremities:    tr edema                                    8.6    10.9  )-----------( 224      ( 23 Jan 2017 10:52 )             25.9     23 Jan 2017 10:52    130    |  92     |  31     ----------------------------<  159    3.3     |  26     |  5.88     Ca    7.8        23 Jan 2017 10:52            Assessment and Plan:  Maintenance HD  Epogen 20K  Supportive care.

## 2017-01-23 NOTE — DISCHARGE NOTE ADULT - CARE PROVIDERS DIRECT ADDRESSES
[email or web address],DirectAddress_Unknown,DirectAddress_Unknown,DirectAddress_Unknown

## 2017-01-23 NOTE — DISCHARGE NOTE ADULT - PLAN OF CARE
Resolution of infection Continue Levofloxacin 250mg every 48 hours (give after dialysis)  Follow up with PCP Resolved Improved with treatment for UTI: klbesiella pneumoniae + Continue hemodialysis as routinely scheduled Routine hemodialysis Goal met Complete antibiotic therapy  Follow up with PCP to monitor CBC Stable Routine follow up Seen by gastroenterology consult for tube exchange

## 2017-01-23 NOTE — DISCHARGE NOTE ADULT - CARE PROVIDER_API CALL
Kyle Wharton (SCOTT), Internal Medicine  [address] [place] [address]  Phone: [phone]  Fax: [phone]    Brennon Swift), Gastroenterology; Internal Medicine  [address] [place] [address]  Phone: [phone]  Fax: [phone]    Julianna Harrington (NP; RN), NP in Adult Health  [address] [place] [address]  Phone: [phone]  Fax: [phone]

## 2017-01-23 NOTE — DISCHARGE NOTE ADULT - PATIENT PORTAL LINK FT
“You can access the Follow[facility] Patient Portal, offered by [facility], by registering with the following website: [email or web address]”

## 2017-01-23 NOTE — DISCHARGE NOTE ADULT - SECONDARY DIAGNOSIS.
Acute pyelonephritis Altered mental status Dialysis patient Leukocytosis, unspecified type Mental retardation PEG tube malfunction

## 2017-01-23 NOTE — PROGRESS NOTE ADULT - PROBLEM SELECTOR PROBLEM 4
PEG (percutaneous endoscopic gastrostomy) status
PEG (percutaneous endoscopic gastrostomy) status
Sepsis, due to unspecified organism

## 2017-01-24 LAB
CULTURE RESULTS: SIGNIFICANT CHANGE UP
CULTURE RESULTS: SIGNIFICANT CHANGE UP
SPECIMEN SOURCE: SIGNIFICANT CHANGE UP
SPECIMEN SOURCE: SIGNIFICANT CHANGE UP

## 2017-01-25 DIAGNOSIS — I69.854 HEMIPLEGIA AND HEMIPARESIS FOLLOWING OTHER CEREBROVASCULAR DISEASE AFFECTING LEFT NON-DOMINANT SIDE: ICD-10-CM
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
## 2017-01-25 DIAGNOSIS — N10 ACUTE PYELONEPHRITIS: ICD-10-CM

## 2017-01-25 DIAGNOSIS — Z99.2 DEPENDENCE ON RENAL DIALYSIS: ICD-10-CM

## 2017-01-25 DIAGNOSIS — A41.9 SEPSIS, UNSPECIFIED ORGANISM: ICD-10-CM

## 2017-01-25 DIAGNOSIS — F79 UNSPECIFIED INTELLECTUAL DISABILITIES: ICD-10-CM

## 2017-01-25 DIAGNOSIS — L89.154 PRESSURE ULCER OF SACRAL REGION, STAGE 4: ICD-10-CM

## 2017-01-25 DIAGNOSIS — E11.9 TYPE 2 DIABETES MELLITUS WITHOUT COMPLICATIONS: ICD-10-CM

## 2017-01-25 DIAGNOSIS — K94.23 GASTROSTOMY MALFUNCTION: ICD-10-CM

## 2017-01-25 DIAGNOSIS — N30.90 CYSTITIS, UNSPECIFIED WITHOUT HEMATURIA: ICD-10-CM

## 2017-01-25 DIAGNOSIS — G93.41 METABOLIC ENCEPHALOPATHY: ICD-10-CM

## 2017-01-25 DIAGNOSIS — C64.9 MALIGNANT NEOPLASM OF UNSPECIFIED KIDNEY, EXCEPT RENAL PELVIS: ICD-10-CM

## 2017-01-25 DIAGNOSIS — N18.6 END STAGE RENAL DISEASE: ICD-10-CM

## 2017-01-25 DIAGNOSIS — E78.00 PURE HYPERCHOLESTEROLEMIA, UNSPECIFIED: ICD-10-CM

## 2017-01-25 DIAGNOSIS — K59.00 CONSTIPATION, UNSPECIFIED: ICD-10-CM

## 2017-01-25 DIAGNOSIS — G40.909 EPILEPSY, UNSPECIFIED, NOT INTRACTABLE, WITHOUT STATUS EPILEPTICUS: ICD-10-CM

## 2017-01-25 DIAGNOSIS — B96.1 KLEBSIELLA PNEUMONIAE [K. PNEUMONIAE] AS THE CAUSE OF DISEASES CLASSIFIED ELSEWHERE: ICD-10-CM

## 2017-01-25 DIAGNOSIS — R63.6 UNDERWEIGHT: ICD-10-CM

## 2017-02-07 ENCOUNTER — INPATIENT (INPATIENT)
Facility: HOSPITAL | Age: 62
LOS: 9 days | Discharge: HOME HEALTH | End: 2017-02-17
Attending: INTERNAL MEDICINE | Admitting: INTERNAL MEDICINE
Payer: MEDICARE

## 2017-02-07 VITALS
DIASTOLIC BLOOD PRESSURE: 64 MMHG | WEIGHT: 138.89 LBS | SYSTOLIC BLOOD PRESSURE: 115 MMHG | HEART RATE: 90 BPM | HEIGHT: 66 IN | OXYGEN SATURATION: 88 % | RESPIRATION RATE: 18 BRPM

## 2017-02-07 DIAGNOSIS — I10 ESSENTIAL (PRIMARY) HYPERTENSION: ICD-10-CM

## 2017-02-07 DIAGNOSIS — T82.41XA BREAKDOWN (MECHANICAL) OF VASCULAR DIALYSIS CATHETER, INITIAL ENCOUNTER: ICD-10-CM

## 2017-02-07 DIAGNOSIS — L89.154 PRESSURE ULCER OF SACRAL REGION, STAGE 4: ICD-10-CM

## 2017-02-07 DIAGNOSIS — L02.91 CUTANEOUS ABSCESS, UNSPECIFIED: ICD-10-CM

## 2017-02-07 DIAGNOSIS — A41.9 SEPSIS, UNSPECIFIED ORGANISM: ICD-10-CM

## 2017-02-07 DIAGNOSIS — R41.82 ALTERED MENTAL STATUS, UNSPECIFIED: ICD-10-CM

## 2017-02-07 DIAGNOSIS — N39.0 URINARY TRACT INFECTION, SITE NOT SPECIFIED: ICD-10-CM

## 2017-02-07 DIAGNOSIS — Z99.2 DEPENDENCE ON RENAL DIALYSIS: ICD-10-CM

## 2017-02-07 DIAGNOSIS — Z93.1 GASTROSTOMY STATUS: Chronic | ICD-10-CM

## 2017-02-07 DIAGNOSIS — R56.9 UNSPECIFIED CONVULSIONS: ICD-10-CM

## 2017-02-07 LAB
ALBUMIN SERPL ELPH-MCNC: 1.9 G/DL — LOW (ref 3.3–5)
ALP SERPL-CCNC: 80 U/L — SIGNIFICANT CHANGE UP (ref 40–120)
ALT FLD-CCNC: 50 U/L — SIGNIFICANT CHANGE UP (ref 12–78)
ANION GAP SERPL CALC-SCNC: 11 MMOL/L — SIGNIFICANT CHANGE UP (ref 5–17)
ANISOCYTOSIS BLD QL: SIGNIFICANT CHANGE UP
APPEARANCE UR: ABNORMAL
APTT BLD: 29.6 SEC — SIGNIFICANT CHANGE UP (ref 27.5–37.4)
AST SERPL-CCNC: 69 U/L — HIGH (ref 15–37)
BACTERIA # UR AUTO: ABNORMAL
BILIRUB SERPL-MCNC: 0.5 MG/DL — SIGNIFICANT CHANGE UP (ref 0.2–1.2)
BILIRUB UR-MCNC: ABNORMAL
BUN SERPL-MCNC: 59 MG/DL — HIGH (ref 7–23)
CALCIUM SERPL-MCNC: 8.5 MG/DL — SIGNIFICANT CHANGE UP (ref 8.5–10.1)
CHLORIDE SERPL-SCNC: 104 MMOL/L — SIGNIFICANT CHANGE UP (ref 96–108)
CO2 SERPL-SCNC: 31 MMOL/L — SIGNIFICANT CHANGE UP (ref 22–31)
COLOR SPEC: YELLOW — SIGNIFICANT CHANGE UP
CREAT SERPL-MCNC: 6.41 MG/DL — HIGH (ref 0.5–1.3)
DIFF PNL FLD: ABNORMAL
EPI CELLS # UR: ABNORMAL
GLUCOSE SERPL-MCNC: 163 MG/DL — HIGH (ref 70–99)
GLUCOSE UR QL: NEGATIVE MG/DL — SIGNIFICANT CHANGE UP
HCT VFR BLD CALC: 28.7 % — LOW (ref 39–50)
HGB BLD-MCNC: 9.4 G/DL — LOW (ref 13–17)
HYPOCHROMIA BLD QL: SLIGHT — SIGNIFICANT CHANGE UP
KETONES UR-MCNC: NEGATIVE — SIGNIFICANT CHANGE UP
LACTATE SERPL-SCNC: 1 MMOL/L — SIGNIFICANT CHANGE UP (ref 0.7–2)
LEUKOCYTE ESTERASE UR-ACNC: ABNORMAL
LYMPHOCYTES # BLD AUTO: 16 % — SIGNIFICANT CHANGE UP (ref 13–44)
MACROCYTES BLD QL: SLIGHT — SIGNIFICANT CHANGE UP
MCHC RBC-ENTMCNC: 30.3 PG — SIGNIFICANT CHANGE UP (ref 27–34)
MCHC RBC-ENTMCNC: 32.8 GM/DL — SIGNIFICANT CHANGE UP (ref 32–36)
MCV RBC AUTO: 92.4 FL — SIGNIFICANT CHANGE UP (ref 80–100)
MONOCYTES NFR BLD AUTO: 4 % — SIGNIFICANT CHANGE UP (ref 2–14)
NEUTROPHILS NFR BLD AUTO: 66 % — SIGNIFICANT CHANGE UP (ref 43–77)
NEUTS BAND # BLD: 13 % — HIGH (ref 0–8)
NITRITE UR-MCNC: NEGATIVE — SIGNIFICANT CHANGE UP
PH UR: 8 — SIGNIFICANT CHANGE UP (ref 4.8–8)
PLAT MORPH BLD: NORMAL — SIGNIFICANT CHANGE UP
PLATELET # BLD AUTO: 224 K/UL — SIGNIFICANT CHANGE UP (ref 150–400)
POTASSIUM SERPL-MCNC: 4.2 MMOL/L — SIGNIFICANT CHANGE UP (ref 3.5–5.3)
POTASSIUM SERPL-SCNC: 4.2 MMOL/L — SIGNIFICANT CHANGE UP (ref 3.5–5.3)
PROT SERPL-MCNC: 6.9 GM/DL — SIGNIFICANT CHANGE UP (ref 6–8.3)
PROT UR-MCNC: 500 MG/DL
RBC # BLD: 3.11 M/UL — LOW (ref 4.2–5.8)
RBC # FLD: 14.7 % — SIGNIFICANT CHANGE UP (ref 11–15)
RBC BLD AUTO: ABNORMAL
RBC CASTS # UR COMP ASSIST: ABNORMAL /HPF (ref 0–4)
SODIUM SERPL-SCNC: 146 MMOL/L — HIGH (ref 135–145)
SP GR SPEC: 1.01 — SIGNIFICANT CHANGE UP (ref 1.01–1.02)
UROBILINOGEN FLD QL: NEGATIVE MG/DL — SIGNIFICANT CHANGE UP
VARIANT LYMPHS # BLD: 1 % — SIGNIFICANT CHANGE UP (ref 0–6)
WBC # BLD: 14.2 K/UL — HIGH (ref 3.8–10.5)
WBC # FLD AUTO: 14.2 K/UL — HIGH (ref 3.8–10.5)
WBC UR QL: >50

## 2017-02-07 PROCEDURE — 71010: CPT | Mod: 26

## 2017-02-07 PROCEDURE — 99284 EMERGENCY DEPT VISIT MOD MDM: CPT

## 2017-02-07 PROCEDURE — 99223 1ST HOSP IP/OBS HIGH 75: CPT | Mod: AI

## 2017-02-07 PROCEDURE — 99223 1ST HOSP IP/OBS HIGH 75: CPT

## 2017-02-07 RX ORDER — SIMVASTATIN 20 MG/1
20 TABLET, FILM COATED ORAL AT BEDTIME
Qty: 0 | Refills: 0 | Status: DISCONTINUED | OUTPATIENT
Start: 2017-02-07 | End: 2017-02-14

## 2017-02-07 RX ORDER — CALCIUM ACETATE 667 MG
667 TABLET ORAL
Qty: 0 | Refills: 0 | Status: DISCONTINUED | OUTPATIENT
Start: 2017-02-07 | End: 2017-02-14

## 2017-02-07 RX ORDER — ACETAMINOPHEN 500 MG
650 TABLET ORAL ONCE
Qty: 0 | Refills: 0 | Status: COMPLETED | OUTPATIENT
Start: 2017-02-07 | End: 2017-02-07

## 2017-02-07 RX ORDER — ACETAMINOPHEN 500 MG
325 TABLET ORAL EVERY 6 HOURS
Qty: 0 | Refills: 0 | Status: DISCONTINUED | OUTPATIENT
Start: 2017-02-07 | End: 2017-02-14

## 2017-02-07 RX ORDER — HEPARIN SODIUM 5000 [USP'U]/ML
5000 INJECTION INTRAVENOUS; SUBCUTANEOUS EVERY 12 HOURS
Qty: 0 | Refills: 0 | Status: DISCONTINUED | OUTPATIENT
Start: 2017-02-07 | End: 2017-02-14

## 2017-02-07 RX ORDER — SODIUM CHLORIDE 9 MG/ML
3 INJECTION INTRAMUSCULAR; INTRAVENOUS; SUBCUTANEOUS ONCE
Qty: 0 | Refills: 0 | Status: COMPLETED | OUTPATIENT
Start: 2017-02-07 | End: 2017-02-07

## 2017-02-07 RX ORDER — LEVETIRACETAM 250 MG/1
750 TABLET, FILM COATED ORAL
Qty: 0 | Refills: 0 | Status: DISCONTINUED | OUTPATIENT
Start: 2017-02-07 | End: 2017-02-07

## 2017-02-07 RX ORDER — OXCARBAZEPINE 300 MG/1
600 TABLET, FILM COATED ORAL
Qty: 0 | Refills: 0 | Status: DISCONTINUED | OUTPATIENT
Start: 2017-02-07 | End: 2017-02-14

## 2017-02-07 RX ORDER — PIPERACILLIN AND TAZOBACTAM 4; .5 G/20ML; G/20ML
3.38 INJECTION, POWDER, LYOPHILIZED, FOR SOLUTION INTRAVENOUS EVERY 12 HOURS
Qty: 0 | Refills: 0 | Status: DISCONTINUED | OUTPATIENT
Start: 2017-02-07 | End: 2017-02-07

## 2017-02-07 RX ORDER — SODIUM CHLORIDE 9 MG/ML
500 INJECTION, SOLUTION INTRAVENOUS
Qty: 0 | Refills: 0 | Status: DISCONTINUED | OUTPATIENT
Start: 2017-02-07 | End: 2017-02-08

## 2017-02-07 RX ORDER — RISPERIDONE 4 MG/1
1 TABLET ORAL DAILY
Qty: 0 | Refills: 0 | Status: DISCONTINUED | OUTPATIENT
Start: 2017-02-07 | End: 2017-02-14

## 2017-02-07 RX ORDER — PIPERACILLIN AND TAZOBACTAM 4; .5 G/20ML; G/20ML
3.38 INJECTION, POWDER, LYOPHILIZED, FOR SOLUTION INTRAVENOUS EVERY 12 HOURS
Qty: 0 | Refills: 0 | Status: DISCONTINUED | OUTPATIENT
Start: 2017-02-08 | End: 2017-02-13

## 2017-02-07 RX ORDER — PIPERACILLIN AND TAZOBACTAM 4; .5 G/20ML; G/20ML
3.38 INJECTION, POWDER, LYOPHILIZED, FOR SOLUTION INTRAVENOUS ONCE
Qty: 0 | Refills: 0 | Status: COMPLETED | OUTPATIENT
Start: 2017-02-07 | End: 2017-02-07

## 2017-02-07 RX ORDER — SODIUM CHLORIDE 9 MG/ML
1890 INJECTION INTRAMUSCULAR; INTRAVENOUS; SUBCUTANEOUS ONCE
Qty: 0 | Refills: 0 | Status: COMPLETED | OUTPATIENT
Start: 2017-02-07 | End: 2017-02-07

## 2017-02-07 RX ORDER — LEVETIRACETAM 250 MG/1
750 TABLET, FILM COATED ORAL
Qty: 0 | Refills: 0 | Status: DISCONTINUED | OUTPATIENT
Start: 2017-02-07 | End: 2017-02-14

## 2017-02-07 RX ORDER — LACTOBACILLUS ACIDOPHILUS 100MM CELL
1 CAPSULE ORAL DAILY
Qty: 0 | Refills: 0 | Status: DISCONTINUED | OUTPATIENT
Start: 2017-02-07 | End: 2017-02-14

## 2017-02-07 RX ORDER — CARVEDILOL PHOSPHATE 80 MG/1
25 CAPSULE, EXTENDED RELEASE ORAL EVERY 12 HOURS
Qty: 0 | Refills: 0 | Status: DISCONTINUED | OUTPATIENT
Start: 2017-02-07 | End: 2017-02-08

## 2017-02-07 RX ORDER — VANCOMYCIN HCL 1 G
1000 VIAL (EA) INTRAVENOUS ONCE
Qty: 0 | Refills: 0 | Status: COMPLETED | OUTPATIENT
Start: 2017-02-07 | End: 2017-02-07

## 2017-02-07 RX ADMIN — SIMVASTATIN 20 MILLIGRAM(S): 20 TABLET, FILM COATED ORAL at 23:24

## 2017-02-07 RX ADMIN — Medication 250 MILLIGRAM(S): at 17:45

## 2017-02-07 RX ADMIN — SODIUM CHLORIDE 1890 MILLILITER(S): 9 INJECTION INTRAMUSCULAR; INTRAVENOUS; SUBCUTANEOUS at 16:52

## 2017-02-07 RX ADMIN — SODIUM CHLORIDE 3 MILLILITER(S): 9 INJECTION INTRAMUSCULAR; INTRAVENOUS; SUBCUTANEOUS at 16:10

## 2017-02-07 RX ADMIN — Medication 650 MILLIGRAM(S): at 16:10

## 2017-02-07 RX ADMIN — PIPERACILLIN AND TAZOBACTAM 200 GRAM(S): 4; .5 INJECTION, POWDER, LYOPHILIZED, FOR SOLUTION INTRAVENOUS at 17:00

## 2017-02-07 NOTE — CONSULT NOTE ADULT - SUBJECTIVE AND OBJECTIVE BOX
Infectious Diseases - Attending at Dr. Glover    HPI:  this is a 61 years old male with history of mental retardation with end stage renal disease and Hypertension brought into the emergency room for blocked dialysis catheter and found to be febrile. patient nonverbal and very somnolent and no further info obtainable .He has a sacral deub   Pt was recently admitted for a UTI and was treated with IV antibiotics. He has been goven a dose of Vancomycin and Zosyn      PAST MEDICAL & SURGICAL HISTORY:  Sepsis associated with vascular access catheter, subsequent encounter  Hyperglycemia  Altered mental status  Dialysis patient  Diabetes  Lipids abnormal  Renal failure  Seizure  HTN (hypertension)  Mental retardation  PEG (percutaneous endoscopic gastrostomy) status  No significant past surgical history      Allergies    No Known Allergies    Intolerances        FAMILY HISTORY:  No pertinent family history in first degree relatives      SOCIAL HISTORY:no smoking,alcoholism etc    REVIEW OF SYSTEMS:  UTO (pt was febrile)    MEDICATIONS  (STANDING):  piperacillin/tazobactam IVPB. 3.375Gram(s) IV Intermittent every 12 hours  heparin  Injectable 5000Unit(s) SubCutaneous every 12 hours  OXcarbazepine 600milliGRAM(s) Oral two times a day  simvastatin 20milliGRAM(s) Oral at bedtime  risperiDONE   Tablet 1milliGRAM(s) Oral daily  carvedilol 25milliGRAM(s) Oral every 12 hours  calcium acetate 667milliGRAM(s) Oral three times a day with meals  Nephro-bryant 1Tablet(s) Oral daily  lactobacillus acidophilus 1Tablet(s) Oral daily  levETIRAcetam  Solution 750milliGRAM(s) Oral two times a day  sodium chloride 0.45%. 500milliLiter(s) IV Continuous <Continuous>    MEDICATIONS  (PRN):  acetaminophen  Suppository 325milliGRAM(s) Rectal every 6 hours PRN For Temp greater than 38 C (100.4 F)      Vital Signs Last 24 Hrs  T(C): 37.7, Max: 40.1 (02-07 @ 16:10)  T(F): 99.8, Max: 104.1 (02- @ 16:10)  HR: 72 (72 - 90)  BP: 75/44 (75/44 - 131/71)  BP(mean): --  RR: 18 (15 - 18)  SpO2: 100% (88% - 100%)    PHYSICAL EXAM:    Constitutional: somnolent,cachectic   HEENT: PERRL  Neck: supple  Respiratory: CTAB/L  Cardiovascular: S1 and S2, RRR, no M/G/R  Gastrointestinal: BS+, soft, NT/ND, PEG +  Extremities: No peripheral edema  Vascular: 2+ peripheral pulses  Neurological:somnolent,advanced dementia  Skin: sacral decub +.M++NW HD catheter present      LABS:                        9.4    14.2  )-----------( 224      ( 2017 16:43 )             28.7     2017 16:43    146    |  104    |  59     ----------------------------<  163    4.2     |  31     |  6.41     Ca    8.5        2017 16:43    TPro  6.9    /  Alb  1.9    /  TBili  0.5    /  DBili  x      /  AST  69     /  ALT  50     /  AlkPhos  80     2017 16:43    PTT - ( 2017 16:43 )  PTT:29.6 sec  Urinalysis Basic - ( 2017 18:04 )    Color: Yellow / Appearance: very cloudy / S.010 / pH: x  Gluc: x / Ketone: Negative  / Bili: Small / Urobili: Negative mg/dL   Blood: x / Protein: 500 mg/dL / Nitrite: Negative   Leuk Esterase: Moderate / RBC: 3-5 /HPF / WBC >50   Sq Epi: x / Non Sq Epi: Many / Bacteria: Many        MICROBIOLOGY:  RECENT CULTURES:        RADIOLOGY & ADDITIONAL STUDIES    CXR  IMPRESSION: Small bilateral pleural effusions, loculated on the right as   well as nonspecific faint patchy opacities in the right upper lobe.

## 2017-02-07 NOTE — ED ADULT NURSE NOTE - OBJECTIVE STATEMENT
pt non verbal awake, sent from dialysis centre for non working perm cath in place to left chest wall, pt has peg, pt has unstagable ulcer to sacrum with tunneling, and excoriation to bilateral buttocks. pt has bilateral unstageable ulcer to the ear lobe

## 2017-02-07 NOTE — H&P ADULT. - HISTORY OF PRESENT ILLNESS
this is a 61 years old male with history of mental retardation with end stage renal disease and Hypertension brought into the emergency room for blocked dialysis catheter and found to be febrile. patient nonverbal and no further info [id number]

## 2017-02-07 NOTE — CONSULT NOTE ADULT - ASSESSMENT
61 yr old with advanced dememtia,bedridden who was recently admitted for and treated with Antibiotics for UTI was brought in for new permacath dysfunction and was sseen with

## 2017-02-07 NOTE — CONSULT NOTE ADULT - PROBLEM SELECTOR RECOMMENDATION 9
with fever and leukocytosis   Can be sec to sacral decub infection  also r/o BSI and UTI  Cont Zosyn .S/P vanco X 1 (has renal failure)

## 2017-02-07 NOTE — H&P ADULT. - RS GEN PE MLT RESP DETAILS PC
clear to auscultation bilaterally/respirations non-labored/no intercostal retractions/good air movement

## 2017-02-08 DIAGNOSIS — L89.94 PRESSURE ULCER OF UNSPECIFIED SITE, STAGE 4: ICD-10-CM

## 2017-02-08 DIAGNOSIS — F79 UNSPECIFIED INTELLECTUAL DISABILITIES: ICD-10-CM

## 2017-02-08 DIAGNOSIS — E11.65 TYPE 2 DIABETES MELLITUS WITH HYPERGLYCEMIA: ICD-10-CM

## 2017-02-08 DIAGNOSIS — N18.6 END STAGE RENAL DISEASE: ICD-10-CM

## 2017-02-08 DIAGNOSIS — A41.50 GRAM-NEGATIVE SEPSIS, UNSPECIFIED: ICD-10-CM

## 2017-02-08 LAB
ANION GAP SERPL CALC-SCNC: 10 MMOL/L — SIGNIFICANT CHANGE UP (ref 5–17)
BLD GP AB SCN SERPL QL: SIGNIFICANT CHANGE UP
BUN SERPL-MCNC: 61 MG/DL — HIGH (ref 7–23)
CALCIUM SERPL-MCNC: 7.7 MG/DL — LOW (ref 8.5–10.1)
CHLORIDE SERPL-SCNC: 105 MMOL/L — SIGNIFICANT CHANGE UP (ref 96–108)
CO2 SERPL-SCNC: 30 MMOL/L — SIGNIFICANT CHANGE UP (ref 22–31)
CREAT SERPL-MCNC: 6.32 MG/DL — HIGH (ref 0.5–1.3)
CULTURE RESULTS: NO GROWTH — SIGNIFICANT CHANGE UP
FLUAV SPEC QL CULT: NEGATIVE — SIGNIFICANT CHANGE UP
FLUBV AG SPEC QL IA: NEGATIVE — SIGNIFICANT CHANGE UP
GLUCOSE SERPL-MCNC: 117 MG/DL — HIGH (ref 70–99)
HCT VFR BLD CALC: 23.6 % — LOW (ref 39–50)
HCT VFR BLD CALC: 23.9 % — LOW (ref 39–50)
HGB BLD-MCNC: 7.6 G/DL — LOW (ref 13–17)
HGB BLD-MCNC: 7.7 G/DL — LOW (ref 13–17)
MCHC RBC-ENTMCNC: 30.6 PG — SIGNIFICANT CHANGE UP (ref 27–34)
MCHC RBC-ENTMCNC: 30.8 PG — SIGNIFICANT CHANGE UP (ref 27–34)
MCHC RBC-ENTMCNC: 32 GM/DL — SIGNIFICANT CHANGE UP (ref 32–36)
MCHC RBC-ENTMCNC: 32.8 GM/DL — SIGNIFICANT CHANGE UP (ref 32–36)
MCV RBC AUTO: 93.9 FL — SIGNIFICANT CHANGE UP (ref 80–100)
MCV RBC AUTO: 95.6 FL — SIGNIFICANT CHANGE UP (ref 80–100)
PLATELET # BLD AUTO: 187 K/UL — SIGNIFICANT CHANGE UP (ref 150–400)
PLATELET # BLD AUTO: 190 K/UL — SIGNIFICANT CHANGE UP (ref 150–400)
POTASSIUM SERPL-MCNC: 3.8 MMOL/L — SIGNIFICANT CHANGE UP (ref 3.5–5.3)
POTASSIUM SERPL-SCNC: 3.8 MMOL/L — SIGNIFICANT CHANGE UP (ref 3.5–5.3)
RBC # BLD: 2.5 M/UL — LOW (ref 4.2–5.8)
RBC # BLD: 2.51 M/UL — LOW (ref 4.2–5.8)
RBC # FLD: 14.5 % — SIGNIFICANT CHANGE UP (ref 11–15)
RBC # FLD: 15.1 % — HIGH (ref 11–15)
SODIUM SERPL-SCNC: 145 MMOL/L — SIGNIFICANT CHANGE UP (ref 135–145)
SPECIMEN SOURCE: SIGNIFICANT CHANGE UP
VANCOMYCIN TROUGH SERPL-MCNC: 19.2 UG/ML — SIGNIFICANT CHANGE UP (ref 10–20)
WBC # BLD: 10.8 K/UL — HIGH (ref 3.8–10.5)
WBC # BLD: 12.8 K/UL — HIGH (ref 3.8–10.5)
WBC # FLD AUTO: 10.8 K/UL — HIGH (ref 3.8–10.5)
WBC # FLD AUTO: 12.8 K/UL — HIGH (ref 3.8–10.5)

## 2017-02-08 PROCEDURE — 99233 SBSQ HOSP IP/OBS HIGH 50: CPT

## 2017-02-08 RX ORDER — INSULIN LISPRO 100/ML
VIAL (ML) SUBCUTANEOUS
Qty: 0 | Refills: 0 | Status: DISCONTINUED | OUTPATIENT
Start: 2017-02-08 | End: 2017-02-14

## 2017-02-08 RX ORDER — DEXTROSE 50 % IN WATER 50 %
1 SYRINGE (ML) INTRAVENOUS ONCE
Qty: 0 | Refills: 0 | Status: DISCONTINUED | OUTPATIENT
Start: 2017-02-08 | End: 2017-02-14

## 2017-02-08 RX ORDER — SODIUM CHLORIDE 9 MG/ML
500 INJECTION, SOLUTION INTRAVENOUS
Qty: 0 | Refills: 0 | Status: DISCONTINUED | OUTPATIENT
Start: 2017-02-08 | End: 2017-02-11

## 2017-02-08 RX ORDER — GLUCAGON INJECTION, SOLUTION 0.5 MG/.1ML
1 INJECTION, SOLUTION SUBCUTANEOUS ONCE
Qty: 0 | Refills: 0 | Status: DISCONTINUED | OUTPATIENT
Start: 2017-02-08 | End: 2017-02-14

## 2017-02-08 RX ORDER — MIDODRINE HYDROCHLORIDE 2.5 MG/1
5 TABLET ORAL EVERY 12 HOURS
Qty: 0 | Refills: 0 | Status: DISCONTINUED | OUTPATIENT
Start: 2017-02-08 | End: 2017-02-14

## 2017-02-08 RX ORDER — SODIUM CHLORIDE 9 MG/ML
1000 INJECTION, SOLUTION INTRAVENOUS
Qty: 0 | Refills: 0 | Status: DISCONTINUED | OUTPATIENT
Start: 2017-02-08 | End: 2017-02-14

## 2017-02-08 RX ORDER — DEXTROSE 50 % IN WATER 50 %
25 SYRINGE (ML) INTRAVENOUS ONCE
Qty: 0 | Refills: 0 | Status: DISCONTINUED | OUTPATIENT
Start: 2017-02-08 | End: 2017-02-14

## 2017-02-08 RX ORDER — ALTEPLASE 100 MG
2 KIT INTRAVENOUS ONCE
Qty: 0 | Refills: 0 | Status: COMPLETED | OUTPATIENT
Start: 2017-02-08 | End: 2017-02-08

## 2017-02-08 RX ORDER — DEXTROSE 50 % IN WATER 50 %
12.5 SYRINGE (ML) INTRAVENOUS ONCE
Qty: 0 | Refills: 0 | Status: DISCONTINUED | OUTPATIENT
Start: 2017-02-08 | End: 2017-02-14

## 2017-02-08 RX ADMIN — OXCARBAZEPINE 600 MILLIGRAM(S): 300 TABLET, FILM COATED ORAL at 19:22

## 2017-02-08 RX ADMIN — ALTEPLASE 2 MILLIGRAM(S): KIT at 16:15

## 2017-02-08 RX ADMIN — LEVETIRACETAM 750 MILLIGRAM(S): 250 TABLET, FILM COATED ORAL at 05:46

## 2017-02-08 RX ADMIN — OXCARBAZEPINE 600 MILLIGRAM(S): 300 TABLET, FILM COATED ORAL at 05:45

## 2017-02-08 RX ADMIN — Medication 667 MILLIGRAM(S): at 13:22

## 2017-02-08 RX ADMIN — Medication 667 MILLIGRAM(S): at 19:48

## 2017-02-08 RX ADMIN — Medication 1 TABLET(S): at 13:22

## 2017-02-08 RX ADMIN — SODIUM CHLORIDE 250 MILLILITER(S): 9 INJECTION, SOLUTION INTRAVENOUS at 06:58

## 2017-02-08 RX ADMIN — PIPERACILLIN AND TAZOBACTAM 25 GRAM(S): 4; .5 INJECTION, POWDER, LYOPHILIZED, FOR SOLUTION INTRAVENOUS at 05:45

## 2017-02-08 RX ADMIN — LEVETIRACETAM 750 MILLIGRAM(S): 250 TABLET, FILM COATED ORAL at 19:21

## 2017-02-08 RX ADMIN — RISPERIDONE 1 MILLIGRAM(S): 4 TABLET ORAL at 13:22

## 2017-02-08 RX ADMIN — MIDODRINE HYDROCHLORIDE 5 MILLIGRAM(S): 2.5 TABLET ORAL at 13:22

## 2017-02-08 RX ADMIN — HEPARIN SODIUM 5000 UNIT(S): 5000 INJECTION INTRAVENOUS; SUBCUTANEOUS at 05:45

## 2017-02-08 RX ADMIN — MIDODRINE HYDROCHLORIDE 5 MILLIGRAM(S): 2.5 TABLET ORAL at 19:22

## 2017-02-08 RX ADMIN — SIMVASTATIN 20 MILLIGRAM(S): 20 TABLET, FILM COATED ORAL at 21:32

## 2017-02-08 RX ADMIN — HEPARIN SODIUM 5000 UNIT(S): 5000 INJECTION INTRAVENOUS; SUBCUTANEOUS at 20:03

## 2017-02-08 RX ADMIN — PIPERACILLIN AND TAZOBACTAM 25 GRAM(S): 4; .5 INJECTION, POWDER, LYOPHILIZED, FOR SOLUTION INTRAVENOUS at 20:03

## 2017-02-08 NOTE — PHYSICAL THERAPY INITIAL EVALUATION ADULT - IMPAIRMENTS FOUND, PT EVAL
integumentary integrity/muscle strength/cognitive impairment/arousal, attention, and cognition/gait, locomotion, and balance/ROM

## 2017-02-08 NOTE — PHYSICAL THERAPY INITIAL EVALUATION ADULT - CRITERIA FOR SKILLED THERAPEUTIC INTERVENTIONS
anticipated discharge recommendation/Pt at this time is not a rehab candidate and is unable to actively participate in PT, therefore d/c from PT program at this time./therapy frequency/predicted duration of therapy intervention/impairments found/functional limitations in following categories/rehab potential/risk reduction/prevention

## 2017-02-08 NOTE — PHYSICAL THERAPY INITIAL EVALUATION ADULT - ADDITIONAL COMMENTS
Patient dependent prior to admission. Spoke with pt's sister Michelle Nolen. As per pt's sister pt is bedbound and goes on stretcher when he goes To dialysis. Pt does not use w/c anymore./ Pt follows up with wound care clinic every other friday and uses silver sulfadiazine cream 1% at home. Pt's sister changes dressing 2x/day. Pt has aide 10hours monday-friday, and 8 hours saturday and sunday

## 2017-02-08 NOTE — PHYSICAL THERAPY INITIAL EVALUATION ADULT - FOLLOWS COMMANDS/ANSWERS QUESTIONS, REHAB EVAL
speech unintelligible/unable to follow commands/unable to follow multi-step instructions/unable to answer questions

## 2017-02-08 NOTE — PHYSICAL THERAPY INITIAL EVALUATION ADULT - BALANCE DISTURBANCE, IDENTIFIED IMPAIRMENT CONTRIBUTE, REHAB EVAL
impaired motor control/abnormal muscle tone/decreased strength/decreased ROM/impaired postural control

## 2017-02-08 NOTE — PROGRESS NOTE ADULT - ASSESSMENT
61 years old male with history of mental retardation with end stage renal disease, DM, seizure disorder, Dysphagia sp PEG tube and Hypertension brought into the emergency room for blocked dialysis catheter and found to be febrile. patient nonverbal and very somnolent and no further info obtainable .He has a sacral deub   Pt was recently admitted for a UTI and was treated with IV antibiotics. 61 years old male with history of mental retardation with end stage renal disease, DM, seizure disorder, Dysphagia sp PEG tube and Hypertension brought into the emergency room for blocked dialysis catheter and found to be febrile. patient nonverbal and very somnolent and no further info obtainable .He has a sacral deubiti. Pt was recently admitted for a UTI and was treated with IV antibiotics. ucx- Culture Results: >100,000 CFU/ml Klebsiella pneumoniae

## 2017-02-08 NOTE — PHYSICAL THERAPY INITIAL EVALUATION ADULT - MODIFIED CLINICAL TEST OF SENSORY INTEGRATION IN BALANCE TEST
Barthel Index: Feeding Score __0_, Bathing Score __0_, Grooming Score __0_, Dressing Score _0__, Bowels Score _0__, Bladder Score _0__, Toilet Score __0_, Transfers Score _0__, Mobility Score _0__, Stairs Score _0__,     Total Score _0__

## 2017-02-08 NOTE — PROGRESS NOTE ADULT - PROBLEM SELECTOR PLAN 1
-piperacillin/tazobactam IVPB. 3.375Gram(s) IV Intermittent every 12 hours  -IV vanco  -on Bacid   -ID is following   -follow up bcx/ucx  -monitor for vitals and pulse ox -ucx- Culture Results: >100,000 CFU/ml Klebsiella pneumoniae  -piperacillin/tazobactam IVPB. 3.375Gram(s) IV Intermittent every 12 hours  -IV vanco  -on Bacid   -ID is following   -follow up bcx/ucx  -monitor for vitals and pulse ox

## 2017-02-08 NOTE — PROGRESS NOTE ADULT - SUBJECTIVE AND OBJECTIVE BOX
Patient is a 61y old  Male who presents with a chief complaint of blocked dialysis catheter (2017 19:49)      OVERNIGHT EVENTS: patient nonverbal and very somnolent and no further info obtainable     REVIEW OF SYSTEMS: as above     MEDICATIONS  (STANDING):  piperacillin/tazobactam IVPB. 3.375Gram(s) IV Intermittent every 12 hours  heparin  Injectable 5000Unit(s) SubCutaneous every 12 hours  OXcarbazepine 600milliGRAM(s) Oral two times a day  simvastatin 20milliGRAM(s) Oral at bedtime  risperiDONE   Tablet 1milliGRAM(s) Oral daily  carvedilol 25milliGRAM(s) Oral every 12 hours  calcium acetate 667milliGRAM(s) Oral three times a day with meals  Nephro-bryant 1Tablet(s) Oral daily  lactobacillus acidophilus 1Tablet(s) Oral daily  levETIRAcetam  Solution 750milliGRAM(s) Oral two times a day  sodium chloride 0.45%. 500milliLiter(s) IV Continuous <Continuous>  sodium chloride 0.45%. 500milliLiter(s) IV Continuous <Continuous>  midodrine 5milliGRAM(s) Oral every 12 hours  alteplase for catheter clearance 2milliGRAM(s) Catheter once  alteplase for catheter clearance 2milliGRAM(s) Catheter once    MEDICATIONS  (PRN):  acetaminophen  Suppository 325milliGRAM(s) Rectal every 6 hours PRN For Temp greater than 38 C (100.4 F)      Allergies    No Known Allergies    Intolerances        T(F): 97.2, Max: 104.1 (02-07 @ 16:10)  HR: 67 (67 - 90)  BP: 122/65 (75/44 - 131/71)  RR: 17 (15 - 18)  SpO2: 96% (88% - 100%)  Wt(kg): --    PHYSICAL EXAM:  GENERAL: NAD  HEAD:  Atraumatic, Normocephalic  EYES: PERRLA, conjunctiva and sclera clear  ENMT:  Moist mucous membranes  NECK: No JVD, Normal thyroid  NERVOUS SYSTEM:  somnolent, does not follows commands   CHEST/LUNG: decreased bilaterally  HEART: Regular rate and rhythm; No murmurs, rubs, or gallops  ABDOMEN: Soft, Nontender, Nondistended; Bowel sounds present, +PEG  EXTREMITIES:  2+ Peripheral Pulses, No clubbing, cyanosis, or edema BL LE  LYMPH: No lymphadenopathy noted  SKIN: sacral stage 4 decubiti    LABS:                        7.7    12.8  )-----------( 187      ( 2017 10:18 )             23.6     2017 10:18    145    |  105    |  61     ----------------------------<  117    3.8     |  30     |  6.32     Ca    7.7        2017 10:18    TPro  6.9    /  Alb  1.9    /  TBili  0.5    /  DBili  x      /  AST  69     /  ALT  50     /  AlkPhos  80     2017 16:43    PTT - ( 2017 16:43 )  PTT:29.6 sec  Urinalysis Basic - ( 2017 18:04 )    Color: Yellow / Appearance: very cloudy / S.010 / pH: x  Gluc: x / Ketone: Negative  / Bili: Small / Urobili: Negative mg/dL   Blood: x / Protein: 500 mg/dL / Nitrite: Negative   Leuk Esterase: Moderate / RBC: 3-5 /HPF / WBC >50   Sq Epi: x / Non Sq Epi: Many / Bacteria: Many      Cultures;   CAPILLARY BLOOD GLUCOSE  183 (2017 18:03)    Lipid panel:           RADIOLOGY & ADDITIONAL TESTS:    Imaging Personally Reviewed:  [ ] YES      Consultant(s) Notes Reviewed:  [ ] YES     Care Discussed with [ ] Consultants [X ] Patient [ ] Family  [x ]    [x ]  Other; RN Patient is a 61y old  Male who presents with a chief complaint of blocked dialysis catheter (2017 19:49)      OVERNIGHT EVENTS: patient nonverbal and very somnolent and no further info obtainable     REVIEW OF SYSTEMS: as above     MEDICATIONS  (STANDING):  piperacillin/tazobactam IVPB. 3.375Gram(s) IV Intermittent every 12 hours  heparin  Injectable 5000Unit(s) SubCutaneous every 12 hours  OXcarbazepine 600milliGRAM(s) Oral two times a day  simvastatin 20milliGRAM(s) Oral at bedtime  risperiDONE   Tablet 1milliGRAM(s) Oral daily  carvedilol 25milliGRAM(s) Oral every 12 hours  calcium acetate 667milliGRAM(s) Oral three times a day with meals  Nephro-bryant 1Tablet(s) Oral daily  lactobacillus acidophilus 1Tablet(s) Oral daily  levETIRAcetam  Solution 750milliGRAM(s) Oral two times a day  sodium chloride 0.45%. 500milliLiter(s) IV Continuous <Continuous>  sodium chloride 0.45%. 500milliLiter(s) IV Continuous <Continuous>  midodrine 5milliGRAM(s) Oral every 12 hours  alteplase for catheter clearance 2milliGRAM(s) Catheter once  alteplase for catheter clearance 2milliGRAM(s) Catheter once    MEDICATIONS  (PRN):  acetaminophen  Suppository 325milliGRAM(s) Rectal every 6 hours PRN For Temp greater than 38 C (100.4 F)      Allergies    No Known Allergies    Intolerances        T(F): 97.2, Max: 104.1 (02-07 @ 16:10)  HR: 67 (67 - 90)  BP: 122/65 (75/44 - 131/71)  RR: 17 (15 - 18)  SpO2: 96% (88% - 100%)  Wt(kg): --    PHYSICAL EXAM:  GENERAL: NAD  HEAD:  Atraumatic, Normocephalic  EYES: PERRLA, conjunctiva and sclera clear  ENMT:  Moist mucous membranes  NECK: No JVD, Normal thyroid  NERVOUS SYSTEM:  somnolent, does not follows commands   CHEST/LUNG: decreased bilaterally  HEART: Regular rate and rhythm; No murmurs, rubs, or gallops  ABDOMEN: Soft, Nontender, Nondistended; Bowel sounds present, +PEG  EXTREMITIES:  2+ Peripheral Pulses, No clubbing, cyanosis, or edema BL LE  LYMPH: No lymphadenopathy noted  SKIN: sacral stage 4 decubiti    LABS:                        7.7    12.8  )-----------( 187      ( 2017 10:18 )             23.6     2017 10:18    145    |  105    |  61     ----------------------------<  117    3.8     |  30     |  6.32     Ca    7.7        2017 10:18    TPro  6.9    /  Alb  1.9    /  TBili  0.5    /  DBili  x      /  AST  69     /  ALT  50     /  AlkPhos  80     2017 16:43    PTT - ( 2017 16:43 )  PTT:29.6 sec  Urinalysis Basic - ( 2017 18:04 )    Color: Yellow / Appearance: very cloudy / S.010 / pH: x  Gluc: x / Ketone: Negative  / Bili: Small / Urobili: Negative mg/dL   Blood: x / Protein: 500 mg/dL / Nitrite: Negative   Leuk Esterase: Moderate / RBC: 3-5 /HPF / WBC >50   Sq Epi: x / Non Sq Epi: Many / Bacteria: Many      Cultures;   bcx- Culture Results:   No growth at 5 days. (17 @ 00:15)    ucx- Culture Results:   >100,000 CFU/ml Klebsiella pneumoniae (17 @ 01:07)      CAPILLARY BLOOD GLUCOSE  183 (2017 18:03)    Lipid panel:           RADIOLOGY & ADDITIONAL TESTS:    Imaging Personally Reviewed:  [x ] YES      Consultant(s) Notes Reviewed:  [x ] YES     Care Discussed with [x ] Consultants [ ] Patient [x ] Family  [x ]    [x ]  Other; RN Patient is a 61y old  Male who presents with a chief complaint of blocked dialysis catheter (2017 19:49)      OVERNIGHT EVENTS: patient nonverbal and very somnolent and no further info obtainable     REVIEW OF SYSTEMS: as above     MEDICATIONS  (STANDING):  piperacillin/tazobactam IVPB. 3.375Gram(s) IV Intermittent every 12 hours  heparin  Injectable 5000Unit(s) SubCutaneous every 12 hours  OXcarbazepine 600milliGRAM(s) Oral two times a day  simvastatin 20milliGRAM(s) Oral at bedtime  risperiDONE   Tablet 1milliGRAM(s) Oral daily  carvedilol 25milliGRAM(s) Oral every 12 hours  calcium acetate 667milliGRAM(s) Oral three times a day with meals  Nephro-bryant 1Tablet(s) Oral daily  lactobacillus acidophilus 1Tablet(s) Oral daily  levETIRAcetam  Solution 750milliGRAM(s) Oral two times a day  sodium chloride 0.45%. 500milliLiter(s) IV Continuous <Continuous>  sodium chloride 0.45%. 500milliLiter(s) IV Continuous <Continuous>  midodrine 5milliGRAM(s) Oral every 12 hours  alteplase for catheter clearance 2milliGRAM(s) Catheter once  alteplase for catheter clearance 2milliGRAM(s) Catheter once    MEDICATIONS  (PRN):  acetaminophen  Suppository 325milliGRAM(s) Rectal every 6 hours PRN For Temp greater than 38 C (100.4 F)      Allergies    No Known Allergies    Intolerances        T(F): 97.2, Max: 104.1 (02-07 @ 16:10)  HR: 67 (67 - 90)  BP: 122/65 (75/44 - 131/71)  RR: 17 (15 - 18)  SpO2: 96% (88% - 100%)  Wt(kg): --    PHYSICAL EXAM:  GENERAL: NAD  HEAD:  Atraumatic, Normocephalic  EYES: PERRLA, conjunctiva and sclera clear  ENMT:  Moist mucous membranes  NECK: No JVD, Normal thyroid  NERVOUS SYSTEM:  somnolent, does not follows commands   CHEST/LUNG: decreased bilaterally  HEART: Regular rate and rhythm; No murmurs, rubs, or gallops  ABDOMEN: Soft, Nontender, Nondistended; Bowel sounds present, +PEG  EXTREMITIES:  no edema BL LE, 2+LUE Edema   LYMPH: No lymphadenopathy noted  SKIN: sacral stage 4 decubiti    LABS:                        7.7    12.8  )-----------( 187      ( 2017 10:18 )             23.6     2017 10:18    145    |  105    |  61     ----------------------------<  117    3.8     |  30     |  6.32     Ca    7.7        2017 10:18    TPro  6.9    /  Alb  1.9    /  TBili  0.5    /  DBili  x      /  AST  69     /  ALT  50     /  AlkPhos  80     2017 16:43    PTT - ( 2017 16:43 )  PTT:29.6 sec  Urinalysis Basic - ( 2017 18:04 )    Color: Yellow / Appearance: very cloudy / S.010 / pH: x  Gluc: x / Ketone: Negative  / Bili: Small / Urobili: Negative mg/dL   Blood: x / Protein: 500 mg/dL / Nitrite: Negative   Leuk Esterase: Moderate / RBC: 3-5 /HPF / WBC >50   Sq Epi: x / Non Sq Epi: Many / Bacteria: Many      Cultures;   bcx- Culture Results:   No growth at 5 days. (17 @ 00:15)    ucx- Culture Results:   >100,000 CFU/ml Klebsiella pneumoniae (17 @ 01:07)      CAPILLARY BLOOD GLUCOSE  183 (2017 18:03)    Lipid panel:           RADIOLOGY & ADDITIONAL TESTS:    Imaging Personally Reviewed:  [x ] YES    cxr- Small bilateral pleural effusions, loculated on the right as   well as nonspecific faint patchy opacities in the right upper lobe.    Consultant(s) Notes Reviewed:  [x ] YES     Care Discussed with [x ] Consultants [ ] Patient [x ] Family  [x ]    [x ]  Other; RN

## 2017-02-08 NOTE — PROGRESS NOTE ADULT - PROBLEM SELECTOR PLAN 1
fever resolved.  Blood culture negaitve  Sacral decub is stage 4 but doesn't look   obviously infected  F/U o wound culture results,blood culture results

## 2017-02-08 NOTE — CONSULT NOTE ADULT - SUBJECTIVE AND OBJECTIVE BOX
Patient is a 61y old  Male who presents with a chief complaint of blocked dialysis catheter (2017 19:49)    HPI:  this is a 61 years old male with history of mental retardation with end stage renal disease and Hypertension brought into the emergency room for blocked dialysis catheter and found to be febrile. patient nonverbal and no further info [id number] (2017 19:49)    Appears comfortable.    PAST MEDICAL & SURGICAL HISTORY:  Sepsis associated with vascular access catheter, subsequent encounter  Hyperglycemia  Altered mental status  Dialysis patient  Diabetes  Lipids abnormal  Renal failure  Seizure  HTN (hypertension)  Mental retardation  PEG (percutaneous endoscopic gastrostomy) status  No significant past surgical history    FAMILY HISTORY:  No pertinent family history in first degree relatives    No Known Allergies    MEDICATIONS  (STANDING):  piperacillin/tazobactam IVPB. 3.375Gram(s) IV Intermittent every 12 hours  heparin  Injectable 5000Unit(s) SubCutaneous every 12 hours  OXcarbazepine 600milliGRAM(s) Oral two times a day  simvastatin 20milliGRAM(s) Oral at bedtime  risperiDONE   Tablet 1milliGRAM(s) Oral daily  calcium acetate 667milliGRAM(s) Oral three times a day with meals  Nephro-bryant 1Tablet(s) Oral daily  lactobacillus acidophilus 1Tablet(s) Oral daily  levETIRAcetam  Solution 750milliGRAM(s) Oral two times a day  sodium chloride 0.45%. 500milliLiter(s) IV Continuous <Continuous>  midodrine 5milliGRAM(s) Oral every 12 hours  alteplase for catheter clearance 2milliGRAM(s) Catheter once  alteplase for catheter clearance 2milliGRAM(s) Catheter once  insulin lispro (HumaLOG) corrective regimen sliding scale  SubCutaneous three times a day before meals  dextrose 5%. 1000milliLiter(s) IV Continuous <Continuous>  dextrose 50% Injectable 12.5Gram(s) IV Push once  dextrose 50% Injectable 25Gram(s) IV Push once  dextrose 50% Injectable 25Gram(s) IV Push once    MEDICATIONS  (PRN):  acetaminophen  Suppository 325milliGRAM(s) Rectal every 6 hours PRN For Temp greater than 38 C (100.4 F)  dextrose Gel 1Dose(s) Oral once PRN Blood Glucose LESS THAN 70 milliGRAM(s)/deciliter  glucagon  Injectable 1milliGRAM(s) IntraMuscular once PRN Glucose LESS THAN 70 milligrams/deciliter    Vital Signs Last 24 Hrs  T(C): 36.2, Max: 40.1 ( @ 16:10)  T(F): 97.2, Max: 104.1 ( @ 16:10)  HR: 67 (67 - 85)  BP: 122/65 (75/44 - 131/71)  BP(mean): --  RR: 17 (15 - 18)  SpO2: 96% (96% - 100%)    CAPILLARY BLOOD GLUCOSE  183 (2017 18:03)    PHYSICAL EXAM:      T(C): 36.2, Max: 40.1 ( @ 16:10)  HR: 67 (67 - 85)  BP: 122/65 (75/44 - 131/71)  RR: 17 (15 - 18)  SpO2: 96% (96% - 100%)  Wt(kg): --  Respiratory: clear anteriorly, decreased BS at bases  Cardiovascular: S1 S2  Gastrointestinal: soft NT ND +BS  Extremities:    tr edema              2017 10:18    145    |  105    |  61     ----------------------------<  117    3.8     |  30     |  6.32     Ca    7.7        2017 10:18    TPro  6.9    /  Alb  1.9    /  TBili  0.5    /  DBili  x      /  AST  69     /  ALT  50     /  AlkPhos  80     2017 16:43                          7.7    12.8  )-----------( 187      ( 2017 10:18 )             23.6     Urinalysis Basic - ( 2017 18:04 )    Color: Yellow / Appearance: very cloudy / S.010 / pH: x  Gluc: x / Ketone: Negative  / Bili: Small / Urobili: Negative mg/dL   Blood: x / Protein: 500 mg/dL / Nitrite: Negative   Leuk Esterase: Moderate / RBC: 3-5 /HPF / WBC >50   Sq Epi: x / Non Sq Epi: Many / Bacteria: Many            Assessment and Plan:    ESRD; for HD today, attempt cath flow;  PRBC pending course, broad spectrum abx.  Will follow.

## 2017-02-08 NOTE — PROGRESS NOTE ADULT - SUBJECTIVE AND OBJECTIVE BOX
Patient is a 61y old  Male who presents with a chief complaint of blocked dialysis catheter (2017 19:49)      INTERVAL HPI / OVERNIGHT EVENTS: Fever resolved, very somnolent still    MEDICATIONS  (STANDING):  piperacillin/tazobactam IVPB. 3.375Gram(s) IV Intermittent every 12 hours  heparin  Injectable 5000Unit(s) SubCutaneous every 12 hours  OXcarbazepine 600milliGRAM(s) Oral two times a day  simvastatin 20milliGRAM(s) Oral at bedtime  risperiDONE   Tablet 1milliGRAM(s) Oral daily  calcium acetate 667milliGRAM(s) Oral three times a day with meals  Nephro-bryant 1Tablet(s) Oral daily  lactobacillus acidophilus 1Tablet(s) Oral daily  levETIRAcetam  Solution 750milliGRAM(s) Oral two times a day  sodium chloride 0.45%. 500milliLiter(s) IV Continuous <Continuous>  midodrine 5milliGRAM(s) Oral every 12 hours  insulin lispro (HumaLOG) corrective regimen sliding scale  SubCutaneous three times a day before meals  dextrose 5%. 1000milliLiter(s) IV Continuous <Continuous>  dextrose 50% Injectable 12.5Gram(s) IV Push once  dextrose 50% Injectable 25Gram(s) IV Push once  dextrose 50% Injectable 25Gram(s) IV Push once    MEDICATIONS  (PRN):  acetaminophen  Suppository 325milliGRAM(s) Rectal every 6 hours PRN For Temp greater than 38 C (100.4 F)  dextrose Gel 1Dose(s) Oral once PRN Blood Glucose LESS THAN 70 milliGRAM(s)/deciliter  glucagon  Injectable 1milliGRAM(s) IntraMuscular once PRN Glucose LESS THAN 70 milligrams/deciliter      Vital Signs Last 24 Hrs  T(C): 36.9, Max: 36.9 (02-08 @ 17:00)  T(F): 98.4, Max: 98.4 (02-08 @ 17:00)  HR: 69 (65 - 76)  BP: 123/72 (76/42 - 123/72)  BP(mean): --  RR: 18 (16 - 18)  SpO2: 98% (96% - 100%)    PHYSICAL EXAM:    Constitutional: NAD,  cachexia  Respiratory: CTAB/L  Cardiovascular: S1 and S2, RRR, no M/G/R  Gastrointestinal: BS+, soft, NT/ND  Extremities: No peripheral edema  Vascular: 2+ peripheral pulses  Skin: stage 4 sacral decub, s/p debridement in past -clean with granular base with some slough centrally    LABS:                        7.6    10.8  )-----------( 190      ( 2017 17:17 )             23.9     2017 10:18    145    |  105    |  61     ----------------------------<  117    3.8     |  30     |  6.32     Ca    7.7        2017 10:18    TPro  6.9    /  Alb  1.9    /  TBili  0.5    /  DBili  x      /  AST  69     /  ALT  50     /  AlkPhos  80     2017 16:43    PTT - ( 2017 16:43 )  PTT:29.6 sec  Urinalysis Basic - ( 2017 18:04 )    Color: Yellow / Appearance: very cloudy / S.010 / pH: x  Gluc: x / Ketone: Negative  / Bili: Small / Urobili: Negative mg/dL   Blood: x / Protein: 500 mg/dL / Nitrite: Negative   Leuk Esterase: Moderate / RBC: 3-5 /HPF / WBC >50   Sq Epi: x / Non Sq Epi: Many / Bacteria: Many          MICROBIOLOGY:  RECENT CULTURES:   .Abscess sacral decub wound XXXX XXXX   Rare Staphylococcus aureus  Few Yeast          RADIOLOGY & ADDITIONAL STUDIES:

## 2017-02-08 NOTE — PHYSICAL THERAPY INITIAL EVALUATION ADULT - MANUAL MUSCLE TESTING RESULTS, REHAB EVAL
not tested due to/Patient unable to appropriately follow directions for meaningful assessment of MMT

## 2017-02-09 DIAGNOSIS — R63.6 UNDERWEIGHT: ICD-10-CM

## 2017-02-09 LAB
-  AMPICILLIN/SULBACTAM: SIGNIFICANT CHANGE UP
-  CEFAZOLIN: SIGNIFICANT CHANGE UP
-  CIPROFLOXACIN: SIGNIFICANT CHANGE UP
-  CLINDAMYCIN: SIGNIFICANT CHANGE UP
-  DAPTOMYCIN: SIGNIFICANT CHANGE UP
-  ERYTHROMYCIN: SIGNIFICANT CHANGE UP
-  GENTAMICIN: SIGNIFICANT CHANGE UP
-  LEVOFLOXACIN: SIGNIFICANT CHANGE UP
-  LINEZOLID: SIGNIFICANT CHANGE UP
-  MOXIFLOXACIN(AEROBIC): SIGNIFICANT CHANGE UP
-  OXACILLIN: SIGNIFICANT CHANGE UP
-  PENICILLIN: SIGNIFICANT CHANGE UP
-  RIFAMPIN: SIGNIFICANT CHANGE UP
-  TETRACYCLINE: SIGNIFICANT CHANGE UP
-  TRIMETHOPRIM/SULFAMETHOXAZOLE: SIGNIFICANT CHANGE UP
-  VANCOMYCIN: SIGNIFICANT CHANGE UP
ANION GAP SERPL CALC-SCNC: 11 MMOL/L — SIGNIFICANT CHANGE UP (ref 5–17)
BASOPHILS # BLD AUTO: 0.1 K/UL — SIGNIFICANT CHANGE UP (ref 0–0.2)
BASOPHILS NFR BLD AUTO: 1.3 % — SIGNIFICANT CHANGE UP (ref 0–2)
BUN SERPL-MCNC: 56 MG/DL — HIGH (ref 7–23)
CALCIUM SERPL-MCNC: 7.9 MG/DL — LOW (ref 8.5–10.1)
CHLORIDE SERPL-SCNC: 106 MMOL/L — SIGNIFICANT CHANGE UP (ref 96–108)
CO2 SERPL-SCNC: 27 MMOL/L — SIGNIFICANT CHANGE UP (ref 22–31)
CREAT SERPL-MCNC: 5.62 MG/DL — HIGH (ref 0.5–1.3)
EOSINOPHIL # BLD AUTO: 0.5 K/UL — SIGNIFICANT CHANGE UP (ref 0–0.5)
EOSINOPHIL NFR BLD AUTO: 7.2 % — HIGH (ref 0–6)
FERRITIN SERPL-MCNC: 2366 NG/ML — HIGH (ref 30–400)
GLUCOSE SERPL-MCNC: 145 MG/DL — HIGH (ref 70–99)
HCT VFR BLD CALC: 25.3 % — LOW (ref 39–50)
HGB BLD-MCNC: 8.6 G/DL — LOW (ref 13–17)
IRON SATN MFR SERPL: 34 % — SIGNIFICANT CHANGE UP (ref 16–55)
IRON SATN MFR SERPL: 36 UG/DL — LOW (ref 45–165)
LYMPHOCYTES # BLD AUTO: 1.6 K/UL — SIGNIFICANT CHANGE UP (ref 1–3.3)
LYMPHOCYTES # BLD AUTO: 22.7 % — SIGNIFICANT CHANGE UP (ref 13–44)
MAGNESIUM SERPL-MCNC: 3.1 MG/DL — HIGH (ref 1.8–2.4)
MCHC RBC-ENTMCNC: 31.5 PG — SIGNIFICANT CHANGE UP (ref 27–34)
MCHC RBC-ENTMCNC: 33.8 GM/DL — SIGNIFICANT CHANGE UP (ref 32–36)
MCV RBC AUTO: 93.1 FL — SIGNIFICANT CHANGE UP (ref 80–100)
METHOD TYPE: SIGNIFICANT CHANGE UP
MONOCYTES # BLD AUTO: 0.5 K/UL — SIGNIFICANT CHANGE UP (ref 0–0.9)
MONOCYTES NFR BLD AUTO: 6.9 % — SIGNIFICANT CHANGE UP (ref 2–14)
NEUTROPHILS # BLD AUTO: 4.4 K/UL — SIGNIFICANT CHANGE UP (ref 1.8–7.4)
NEUTROPHILS NFR BLD AUTO: 61.9 % — SIGNIFICANT CHANGE UP (ref 43–77)
PLATELET # BLD AUTO: 108 K/UL — LOW (ref 150–400)
POTASSIUM SERPL-MCNC: 3.7 MMOL/L — SIGNIFICANT CHANGE UP (ref 3.5–5.3)
POTASSIUM SERPL-SCNC: 3.7 MMOL/L — SIGNIFICANT CHANGE UP (ref 3.5–5.3)
RBC # BLD: 2.71 M/UL — LOW (ref 4.2–5.8)
RBC # FLD: 15.1 % — HIGH (ref 11–15)
SODIUM SERPL-SCNC: 144 MMOL/L — SIGNIFICANT CHANGE UP (ref 135–145)
TIBC SERPL-MCNC: 106 UG/DL — LOW (ref 220–430)
UIBC SERPL-MCNC: 70 UG/DL — LOW (ref 110–370)
WBC # BLD: 7 K/UL — SIGNIFICANT CHANGE UP (ref 3.8–10.5)
WBC # FLD AUTO: 7 K/UL — SIGNIFICANT CHANGE UP (ref 3.8–10.5)

## 2017-02-09 PROCEDURE — 99233 SBSQ HOSP IP/OBS HIGH 50: CPT

## 2017-02-09 RX ORDER — ALTEPLASE 100 MG
2 KIT INTRAVENOUS ONCE
Qty: 0 | Refills: 0 | Status: COMPLETED | OUTPATIENT
Start: 2017-02-09 | End: 2017-02-09

## 2017-02-09 RX ADMIN — OXCARBAZEPINE 600 MILLIGRAM(S): 300 TABLET, FILM COATED ORAL at 05:59

## 2017-02-09 RX ADMIN — OXCARBAZEPINE 600 MILLIGRAM(S): 300 TABLET, FILM COATED ORAL at 17:55

## 2017-02-09 RX ADMIN — HEPARIN SODIUM 5000 UNIT(S): 5000 INJECTION INTRAVENOUS; SUBCUTANEOUS at 17:53

## 2017-02-09 RX ADMIN — Medication 667 MILLIGRAM(S): at 09:21

## 2017-02-09 RX ADMIN — Medication 2: at 07:44

## 2017-02-09 RX ADMIN — Medication 1 TABLET(S): at 17:55

## 2017-02-09 RX ADMIN — LEVETIRACETAM 750 MILLIGRAM(S): 250 TABLET, FILM COATED ORAL at 05:59

## 2017-02-09 RX ADMIN — PIPERACILLIN AND TAZOBACTAM 25 GRAM(S): 4; .5 INJECTION, POWDER, LYOPHILIZED, FOR SOLUTION INTRAVENOUS at 17:56

## 2017-02-09 RX ADMIN — Medication 667 MILLIGRAM(S): at 17:55

## 2017-02-09 RX ADMIN — MIDODRINE HYDROCHLORIDE 5 MILLIGRAM(S): 2.5 TABLET ORAL at 17:54

## 2017-02-09 RX ADMIN — PIPERACILLIN AND TAZOBACTAM 25 GRAM(S): 4; .5 INJECTION, POWDER, LYOPHILIZED, FOR SOLUTION INTRAVENOUS at 05:59

## 2017-02-09 RX ADMIN — Medication 1 TABLET(S): at 17:54

## 2017-02-09 RX ADMIN — SIMVASTATIN 20 MILLIGRAM(S): 20 TABLET, FILM COATED ORAL at 22:37

## 2017-02-09 RX ADMIN — LEVETIRACETAM 750 MILLIGRAM(S): 250 TABLET, FILM COATED ORAL at 17:55

## 2017-02-09 RX ADMIN — MIDODRINE HYDROCHLORIDE 5 MILLIGRAM(S): 2.5 TABLET ORAL at 05:59

## 2017-02-09 RX ADMIN — HEPARIN SODIUM 5000 UNIT(S): 5000 INJECTION INTRAVENOUS; SUBCUTANEOUS at 05:59

## 2017-02-09 RX ADMIN — RISPERIDONE 1 MILLIGRAM(S): 4 TABLET ORAL at 17:53

## 2017-02-09 NOTE — PROGRESS NOTE ADULT - SUBJECTIVE AND OBJECTIVE BOX
Patient seen in follow up for     MEDICATIONS  (STANDING):  piperacillin/tazobactam IVPB. 3.375Gram(s) IV Intermittent every 12 hours  heparin  Injectable 5000Unit(s) SubCutaneous every 12 hours  OXcarbazepine 600milliGRAM(s) Oral two times a day  simvastatin 20milliGRAM(s) Oral at bedtime  risperiDONE   Tablet 1milliGRAM(s) Oral daily  calcium acetate 667milliGRAM(s) Oral three times a day with meals  Nephro-bryant 1Tablet(s) Oral daily  lactobacillus acidophilus 1Tablet(s) Oral daily  levETIRAcetam  Solution 750milliGRAM(s) Oral two times a day  sodium chloride 0.45%. 500milliLiter(s) IV Continuous <Continuous>  midodrine 5milliGRAM(s) Oral every 12 hours  insulin lispro (HumaLOG) corrective regimen sliding scale  SubCutaneous three times a day before meals  dextrose 5%. 1000milliLiter(s) IV Continuous <Continuous>  dextrose 50% Injectable 12.5Gram(s) IV Push once  dextrose 50% Injectable 25Gram(s) IV Push once  dextrose 50% Injectable 25Gram(s) IV Push once  alteplase for catheter clearance 2milliGRAM(s) Catheter once    MEDICATIONS  (PRN):  acetaminophen  Suppository 325milliGRAM(s) Rectal every 6 hours PRN For Temp greater than 38 C (100.4 F)  dextrose Gel 1Dose(s) Oral once PRN Blood Glucose LESS THAN 70 milliGRAM(s)/deciliter  glucagon  Injectable 1milliGRAM(s) IntraMuscular once PRN Glucose LESS THAN 70 milligrams/deciliter    PHYSICAL EXAM:      T(C): 37.2, Max: 37.2 (02-09 @ 05:24)  HR: 128 (65 - 128)  BP: 109/62 (95/63 - 123/72)  RR: 18 (16 - 18)  SpO2: 90% (90% - 100%)  Wt(kg): --  Respiratory: clear anteriorly, decreased BS at bases  Cardiovascular: S1 S2  Gastrointestinal: soft NT ND +BS  Extremities:  1 + edema                                    8.6    7.0   )-----------( 108      ( 09 Feb 2017 06:51 )             25.3     09 Feb 2017 06:51    144    |  106    |  56     ----------------------------<  145    3.7     |  27     |  5.62     Ca    7.9        09 Feb 2017 06:51  Mg     3.1       09 Feb 2017 06:51    TPro  6.9    /  Alb  1.9    /  TBili  0.5    /  DBili  x      /  AST  69     /  ALT  50     /  AlkPhos  80     07 Feb 2017 16:43      LIVER FUNCTIONS - ( 07 Feb 2017 16:43 )  Alb: 1.9 g/dL / Pro: 6.9 gm/dL / ALK PHOS: 80 U/L / ALT: 50 U/L / AST: 69 U/L / GGT: x             Assessment and Plan:    HD today via emily  cath; perm cath exchange tomorrow.  Will follow. Hgb Stable.

## 2017-02-09 NOTE — PROGRESS NOTE ADULT - SUBJECTIVE AND OBJECTIVE BOX
Patient is a 61y old  Male who presents with a chief complaint of blocked dialysis catheter (2017 19:49)      OVERNIGHT EVENTS: patient nonverbal. More awake today.     REVIEW OF SYSTEMS: no vomiting, no acute pain    MEDICATIONS  (STANDING):  piperacillin/tazobactam IVPB. 3.375Gram(s) IV Intermittent every 12 hours  heparin  Injectable 5000Unit(s) SubCutaneous every 12 hours  OXcarbazepine 600milliGRAM(s) Oral two times a day  simvastatin 20milliGRAM(s) Oral at bedtime  risperiDONE   Tablet 1milliGRAM(s) Oral daily  calcium acetate 667milliGRAM(s) Oral three times a day with meals  Nephro-bryant 1Tablet(s) Oral daily  lactobacillus acidophilus 1Tablet(s) Oral daily  levETIRAcetam  Solution 750milliGRAM(s) Oral two times a day  sodium chloride 0.45%. 500milliLiter(s) IV Continuous <Continuous>  midodrine 5milliGRAM(s) Oral every 12 hours  insulin lispro (HumaLOG) corrective regimen sliding scale  SubCutaneous three times a day before meals  dextrose 5%. 1000milliLiter(s) IV Continuous <Continuous>  dextrose 50% Injectable 12.5Gram(s) IV Push once  dextrose 50% Injectable 25Gram(s) IV Push once  dextrose 50% Injectable 25Gram(s) IV Push once  alteplase for catheter clearance 2milliGRAM(s) Catheter once    MEDICATIONS  (PRN):  acetaminophen  Suppository 325milliGRAM(s) Rectal every 6 hours PRN For Temp greater than 38 C (100.4 F)  dextrose Gel 1Dose(s) Oral once PRN Blood Glucose LESS THAN 70 milliGRAM(s)/deciliter  glucagon  Injectable 1milliGRAM(s) IntraMuscular once PRN Glucose LESS THAN 70 milligrams/deciliter      Allergies    No Known Allergies    Intolerances        T(F): 98.9, Max: 98.9 (02-09 @ 05:24)  HR: 128 (65 - 128)  BP: 109/62 (95/63 - 123/72)  RR: 18 (16 - 18)  SpO2: 90% (90% - 100%)  Wt(kg): --    PHYSICAL EXAM:  GENERAL: NAD  HEAD:  Atraumatic, Normocephalic  EYES: PERRLA, conjunctiva and sclera clear  ENMT:  Moist mucous membranes  NECK: No JVD, Normal thyroid  NERVOUS SYSTEM:  awake, does not follows commands   CHEST/LUNG: decreased bilaterally  HEART: Regular rate and rhythm; No murmurs, rubs, or gallops  ABDOMEN: Soft, Nontender, Nondistended; Bowel sounds present, +PEG  EXTREMITIES:  no edema BL LE, 2+LUE Edema, right  femoral vein Central Line placed today  LYMPH: No lymphadenopathy noted  SKIN: sacral stage 4 decubiti    LABS:                        8.6    7.0   )-----------( 108      ( 2017 06:51 )             25.3     2017 06:51    144    |  106    |  56     ----------------------------<  145    3.7     |  27     |  5.62     Ca    7.9        2017 06:51  Mg     3.1       2017 06:51    TPro  6.9    /  Alb  1.9    /  TBili  0.5    /  DBili  x      /  AST  69     /  ALT  50     /  AlkPhos  80     2017 16:43    PTT - ( 2017 16:43 )  PTT:29.6 sec  Urinalysis Basic - ( 2017 18:04 )    Color: Yellow / Appearance: very cloudy / S.010 / pH: x  Gluc: x / Ketone: Negative  / Bili: Small / Urobili: Negative mg/dL   Blood: x / Protein: 500 mg/dL / Nitrite: Negative   Leuk Esterase: Moderate / RBC: 3-5 /HPF / WBC >50   Sq Epi: x / Non Sq Epi: Many / Bacteria: Many      Cultures;   bcx- Culture Results:   No growth to date. (17 @ 00:14)    ucx- Culture Results:   No growth (17 @ 00:39)    abscess cx- Culture Results:   Rare Staphylococcus aureus  Few Yeast (17 @ 00:24)      CAPILLARY BLOOD GLUCOSE  165 (2017 07:45)    Lipid panel:       RADIOLOGY & ADDITIONAL TESTS:    Imaging Personally Reviewed:  [ ] YES      Consultant(s) Notes Reviewed:  [ ] YES     Care Discussed with [ ] Consultants [X ] Patient [ ] Family  [x ]    [x ]  Other; RN Patient is a 61y old  Male who presents with a chief complaint of blocked dialysis catheter (2017 19:49)      OVERNIGHT EVENTS: patient nonverbal. More awake today.     REVIEW OF SYSTEMS: no vomiting, no acute pain    MEDICATIONS  (STANDING):  piperacillin/tazobactam IVPB. 3.375Gram(s) IV Intermittent every 12 hours  heparin  Injectable 5000Unit(s) SubCutaneous every 12 hours  OXcarbazepine 600milliGRAM(s) Oral two times a day  simvastatin 20milliGRAM(s) Oral at bedtime  risperiDONE   Tablet 1milliGRAM(s) Oral daily  calcium acetate 667milliGRAM(s) Oral three times a day with meals  Nephro-bryant 1Tablet(s) Oral daily  lactobacillus acidophilus 1Tablet(s) Oral daily  levETIRAcetam  Solution 750milliGRAM(s) Oral two times a day  sodium chloride 0.45%. 500milliLiter(s) IV Continuous <Continuous>  midodrine 5milliGRAM(s) Oral every 12 hours  insulin lispro (HumaLOG) corrective regimen sliding scale  SubCutaneous three times a day before meals  dextrose 5%. 1000milliLiter(s) IV Continuous <Continuous>  dextrose 50% Injectable 12.5Gram(s) IV Push once  dextrose 50% Injectable 25Gram(s) IV Push once  dextrose 50% Injectable 25Gram(s) IV Push once  alteplase for catheter clearance 2milliGRAM(s) Catheter once    MEDICATIONS  (PRN):  acetaminophen  Suppository 325milliGRAM(s) Rectal every 6 hours PRN For Temp greater than 38 C (100.4 F)  dextrose Gel 1Dose(s) Oral once PRN Blood Glucose LESS THAN 70 milliGRAM(s)/deciliter  glucagon  Injectable 1milliGRAM(s) IntraMuscular once PRN Glucose LESS THAN 70 milligrams/deciliter      Allergies    No Known Allergies    Intolerances        T(F): 98.9, Max: 98.9 (02-09 @ 05:24)  HR: 128 (65 - 128)  BP: 109/62 (95/63 - 123/72)  RR: 18 (16 - 18)  SpO2: 90% (90% - 100%)  Wt(kg): --    PHYSICAL EXAM:  GENERAL: NAD  HEAD:  Atraumatic, Normocephalic  EYES: PERRLA, conjunctiva and sclera clear  ENMT:  Moist mucous membranes  NECK: No JVD, Normal thyroid  NERVOUS SYSTEM:  awake, does not follows commands   CHEST/LUNG: decreased bilaterally  HEART: Regular rate and rhythm; No murmurs, rubs, or gallops  ABDOMEN: Soft, Nontender, Nondistended; Bowel sounds present, +PEG  EXTREMITIES:  1+ edema BL LE, 2+LUE Edema, right  femoral vein Central Line placed today  LYMPH: No lymphadenopathy noted  SKIN: sacral stage 4 decubiti    LABS:                        8.6    7.0   )-----------( 108      ( 2017 06:51 )             25.3     2017 06:51    144    |  106    |  56     ----------------------------<  145    3.7     |  27     |  5.62     Ca    7.9        2017 06:51  Mg     3.1       2017 06:51    TPro  6.9    /  Alb  1.9    /  TBili  0.5    /  DBili  x      /  AST  69     /  ALT  50     /  AlkPhos  80     2017 16:43    PTT - ( 2017 16:43 )  PTT:29.6 sec  Urinalysis Basic - ( 2017 18:04 )    Color: Yellow / Appearance: very cloudy / S.010 / pH: x  Gluc: x / Ketone: Negative  / Bili: Small / Urobili: Negative mg/dL   Blood: x / Protein: 500 mg/dL / Nitrite: Negative   Leuk Esterase: Moderate / RBC: 3-5 /HPF / WBC >50   Sq Epi: x / Non Sq Epi: Many / Bacteria: Many      Cultures;   bcx- Culture Results:   No growth to date. (17 @ 00:14)    ucx- Culture Results:   No growth (17 @ 00:39)    abscess cx- Culture Results:   Rare Staphylococcus aureus  Few Yeast (17 @ 00:24)      CAPILLARY BLOOD GLUCOSE  165 (2017 07:45)    Lipid panel:       RADIOLOGY & ADDITIONAL TESTS:    Imaging Personally Reviewed:  [ ] YES      Consultant(s) Notes Reviewed:  [ ] YES     Care Discussed with [ ] Consultants [X ] Patient [x ] Family  [x ]    [x ]  Other; RN

## 2017-02-09 NOTE — PROGRESS NOTE ADULT - PROBLEM SELECTOR PLAN 1
-ucx- Culture Results: >100,000 CFU/ml Klebsiella pneumoniae  -piperacillin/tazobactam IVPB. 3.375Gram(s) IV Intermittent every 12 hours  -IV vanco  -on Bacid   -ID is following   -follow up bcx/ucx  -monitor for vitals and pulse ox

## 2017-02-09 NOTE — PROGRESS NOTE ADULT - PROBLEM SELECTOR PLAN 6
-d/c carvedilol (was hypotensive on admission)  -monitor vitals, on midodrine -off carvedilol (was hypotensive on admission)  -monitor vitals, on midodrine

## 2017-02-09 NOTE — PROGRESS NOTE ADULT - ASSESSMENT
61 years old male with history of mental retardation with end stage renal disease, DM, seizure disorder, Dysphagia sp PEG tube and Hypertension brought into the emergency room for blocked dialysis catheter and found to be febrile. patient nonverbal and very somnolent and no further info obtainable .He has a sacral deubiti. Pt was recently admitted for a UTI and was treated with IV antibiotics. ucx- Culture Results: >100,000 CFU/ml Klebsiella pneumoniae

## 2017-02-09 NOTE — DIETITIAN INITIAL EVALUATION ADULT. - ENERGY NEEDS
Height (cm): 167.6 (02-07)  Weight (kg): 47.6 (02-07)  BMI (kg/m2): 16.9 (02-07)  IBW:   64.5 +/- 10%       % IBW:  74%           UBW:   48.2 kg (1/21/17)    %UBW:99%

## 2017-02-09 NOTE — DIETITIAN INITIAL EVALUATION ADULT. - NS AS NUTRI INTERV ENTERAL NUTRITION
Composition/Feeding tube flush/Continue c tube feed: Nepro via PEG @ goal rate of 45 ml/hr (provides 1944 kcal, 87 g protein, 788 ml fluid); + free H20 250 ml q 6 hrs./Volume/Rate Rate/Composition/Continue c tube feed: Nepro via PEG @ goal rate of 45 ml/hr (provides 1944 kcal, 87 g protein, 788 ml fluid); + free H20 200 ml q 6 hrs./Volume/Feeding tube flush

## 2017-02-09 NOTE — DIETITIAN INITIAL EVALUATION ADULT. - OTHER INFO
Pt seen for consult - chew/swallowing difficulty, FTT + nutrition support, HD , & PU > stage 2. Unable to interview pt. Per discussion c pt sister (caregiver) on previous admit (1/21), PEG used for emergency only; pt was being fed pureed consistency/thin liquids + Nepro x 1/day @ home; appetite was reported as good; pt on PO diet while in hospital also.  Sister reported pt has HHA assistance (10 hrs/day x 5 days/wk + 6 hrs/day x 2 days/wk).Pt DM is diet controlled; no DM meds taken. Sister reported wt loss 2/2 decreased appetite but was unable to provide specifics on amount or time frame; wt stable since last admission. No reports of N/V/C/D; per RN pt tolerating tube feeding well; goal rate reached this am. Hospice care under consideration at this time. Pt seen for consult - chew/swallowing difficulty, FTT + nutrition support, HD , & PU > stage 2. Unable to interview pt. Per discussion c pt sister (caregiver) on previous admit (1/21), PEG used for emergency only; pt was being fed pureed consistency/thin liquids + Nepro x 1/day @ home; appetite was reported as good; pt on PO diet while in hospital also.  Sister reported pt has HHA assistance (10 hrs/day x 5 days/wk + 6 hrs/day x 2 days/wk).Pt DM is diet controlled; no DM meds taken. Sister reported wt loss 2/2 decreased appetite but was unable to provide specifics on amount or time frame; wt stable since last admission. No reports of N/V/C/D; per RN pt tolerating tube feeding well; goal rate reached this am. Palliative care pending at this time.

## 2017-02-09 NOTE — DIETITIAN INITIAL EVALUATION ADULT. - PERTINENT MEDS FT
Heparin, Zosyn, 0.5 NS, Humalog sliding scale, Midodrine, Phoslo, Bacid, Keppra, Nephro-Prosper, Trileptal, Risperdal, Zocor

## 2017-02-09 NOTE — DIETITIAN INITIAL EVALUATION ADULT. - PERTINENT LABORATORY DATA
02-09 Na144 mmol/L Glu 145 mg/dL<H> K+ 3.7 mmol/L Cr  5.62 mg/dL<H> BUN 56 mg/dL<H> Phos n/a   Alb n/a   PAB n/a; GFR 12L, Mg 3.1H; (1/22) HgbA1c 5.6 WDL

## 2017-02-09 NOTE — DIETITIAN INITIAL EVALUATION ADULT. - PHYSICAL APPEARANCE
contracted/BMI = 16.9; 1+ edema noted; no physical signs of malnutrition present; thinness of legs & arms appear to be a result of atrophied muscles not malnutrition (pt is bed bound)/emaciated/debilitated

## 2017-02-09 NOTE — PROGRESS NOTE ADULT - SUBJECTIVE AND OBJECTIVE BOX
Patient from my office practice admitted for catheter related complications  chart reviewed and patient examined  Vascular/renal plans appreciated    Plan  Would be able to resume care of my patient from tomorrow if no objection from dr waterman- will inform

## 2017-02-09 NOTE — CHART NOTE - NSCHARTNOTEFT_GEN_A_CORE
Upon Nutritional Assessment by the Registered Dietitian your patient was determined to meet criteria / has evidence of the following diagnosis/diagnoses:          [ ]  Mild Protein Calorie Malnutrition        [ ]  Moderate Protein Calorie Malnutrition        [ ] Severe Protein Calorie Malnutrition        [ ] Unspecified Protein Calorie Malnutrition        [x ] Underweight / BMI <19        [ ] Morbid Obesity / BMI > 40      Findings as based on:  •  Comprehensive nutrition assessment and consultation  •  Calorie counts (nutrient intake analysis)  •  Food acceptance and intake status from observations by staff  •  Follow up  •  Patient education  •  Intervention secondary to interdisciplinary rounds  •   concerns      Treatment:    The following diet has been recommended:  Tube Feeding: Nepro via PEG @ goal rate of 45 ml/hr + Uriha x 2/day      PROVIDER Section:     By signing this assessment you are acknowledging and agree with the diagnosis/diagnoses assigned by the Registered Dietitian    Comments:

## 2017-02-10 DIAGNOSIS — R78.81 BACTEREMIA: ICD-10-CM

## 2017-02-10 DIAGNOSIS — Z93.1 GASTROSTOMY STATUS: ICD-10-CM

## 2017-02-10 PROCEDURE — 99233 SBSQ HOSP IP/OBS HIGH 50: CPT

## 2017-02-10 RX ORDER — VANCOMYCIN HCL 1 G
1000 VIAL (EA) INTRAVENOUS ONCE
Qty: 0 | Refills: 0 | Status: COMPLETED | OUTPATIENT
Start: 2017-02-10 | End: 2017-02-10

## 2017-02-10 RX ADMIN — LEVETIRACETAM 750 MILLIGRAM(S): 250 TABLET, FILM COATED ORAL at 20:21

## 2017-02-10 RX ADMIN — OXCARBAZEPINE 600 MILLIGRAM(S): 300 TABLET, FILM COATED ORAL at 05:40

## 2017-02-10 RX ADMIN — Medication 250 MILLIGRAM(S): at 20:08

## 2017-02-10 RX ADMIN — HEPARIN SODIUM 5000 UNIT(S): 5000 INJECTION INTRAVENOUS; SUBCUTANEOUS at 06:25

## 2017-02-10 RX ADMIN — MIDODRINE HYDROCHLORIDE 5 MILLIGRAM(S): 2.5 TABLET ORAL at 05:40

## 2017-02-10 RX ADMIN — PIPERACILLIN AND TAZOBACTAM 25 GRAM(S): 4; .5 INJECTION, POWDER, LYOPHILIZED, FOR SOLUTION INTRAVENOUS at 05:41

## 2017-02-10 RX ADMIN — Medication 1 TABLET(S): at 13:37

## 2017-02-10 RX ADMIN — RISPERIDONE 1 MILLIGRAM(S): 4 TABLET ORAL at 13:38

## 2017-02-10 RX ADMIN — HEPARIN SODIUM 5000 UNIT(S): 5000 INJECTION INTRAVENOUS; SUBCUTANEOUS at 20:23

## 2017-02-10 RX ADMIN — PIPERACILLIN AND TAZOBACTAM 25 GRAM(S): 4; .5 INJECTION, POWDER, LYOPHILIZED, FOR SOLUTION INTRAVENOUS at 20:25

## 2017-02-10 RX ADMIN — Medication 667 MILLIGRAM(S): at 09:12

## 2017-02-10 RX ADMIN — Medication 667 MILLIGRAM(S): at 13:37

## 2017-02-10 RX ADMIN — Medication 1 TABLET(S): at 13:38

## 2017-02-10 RX ADMIN — LEVETIRACETAM 750 MILLIGRAM(S): 250 TABLET, FILM COATED ORAL at 05:40

## 2017-02-10 RX ADMIN — OXCARBAZEPINE 600 MILLIGRAM(S): 300 TABLET, FILM COATED ORAL at 20:22

## 2017-02-10 NOTE — PROGRESS NOTE ADULT - SUBJECTIVE AND OBJECTIVE BOX
Patient is a 61y old  Male who presents with a chief complaint of blocked dialysis catheter (07 Feb 2017 19:49)      INTERVAL HPI / OVERNIGHT EVENTS: fever resolved, more awake.    MEDICATIONS  (STANDING):  piperacillin/tazobactam IVPB. 3.375Gram(s) IV Intermittent every 12 hours  heparin  Injectable 5000Unit(s) SubCutaneous every 12 hours  OXcarbazepine 600milliGRAM(s) Oral two times a day  simvastatin 20milliGRAM(s) Oral at bedtime  risperiDONE   Tablet 1milliGRAM(s) Oral daily  calcium acetate 667milliGRAM(s) Oral three times a day with meals  Nephro-bryant 1Tablet(s) Oral daily  lactobacillus acidophilus 1Tablet(s) Oral daily  levETIRAcetam  Solution 750milliGRAM(s) Oral two times a day  sodium chloride 0.45%. 500milliLiter(s) IV Continuous <Continuous>  midodrine 5milliGRAM(s) Oral every 12 hours  insulin lispro (HumaLOG) corrective regimen sliding scale  SubCutaneous three times a day before meals  dextrose 5%. 1000milliLiter(s) IV Continuous <Continuous>  dextrose 50% Injectable 12.5Gram(s) IV Push once  dextrose 50% Injectable 25Gram(s) IV Push once  dextrose 50% Injectable 25Gram(s) IV Push once  vancomycin  IVPB 1000milliGRAM(s) IV Intermittent once    MEDICATIONS  (PRN):  acetaminophen  Suppository 325milliGRAM(s) Rectal every 6 hours PRN For Temp greater than 38 C (100.4 F)  dextrose Gel 1Dose(s) Oral once PRN Blood Glucose LESS THAN 70 milliGRAM(s)/deciliter  glucagon  Injectable 1milliGRAM(s) IntraMuscular once PRN Glucose LESS THAN 70 milligrams/deciliter      Vital Signs Last 24 Hrs  T(C): 36.1, Max: 37.2 (02-10 @ 06:08)  T(F): 97, Max: 98.9 (02-10 @ 06:08)  HR: 57 (57 - 68)  BP: 131/70 (112/64 - 131/70)  BP(mean): --  RR: 18 (17 - 18)  SpO2: 100% (99% - 100%)    PHYSICAL EXAM:    Constitutional: NAD, well-groomed, well-developed  Respiratory: CTAB/L  Cardiovascular: S1 and S2, RRR, no M/G/R  Gastrointestinal: BS+, soft, NT/ND  Extremities: No peripheral edema  Vascular: 2+ peripheral pulses  Skin:decub -clean ,granular base  Lines-permacath +(dysfunctional)  Right groin catheter+        LABS:                        8.6    7.0   )-----------( 108      ( 09 Feb 2017 06:51 )             25.3     09 Feb 2017 06:51    144    |  106    |  56     ----------------------------<  145    3.7     |  27     |  5.62     Ca    7.9        09 Feb 2017 06:51  Mg     3.1       09 Feb 2017 06:51              MICROBIOLOGY:  RECENT CULTURES:  02-08 .Urine Catheterized XXXX XXXX   No growth    02-08 .Abscess sacral decub wound Methicillin resistant Staphylococcus aureus XXXX   Rare Methicillin resistant Staphylococcus aureus  Few Presumptive Angelica albicans    02-08 .Blood Blood-Peripheral XXXX   Growth in aerobic bottle:  Gram Positive Cocci in Clusters   Growth in aerobic bottle:  Staphylococcus species Identification to follow.          RADIOLOGY & ADDITIONAL STUDIES:

## 2017-02-10 NOTE — PROGRESS NOTE ADULT - SUBJECTIVE AND OBJECTIVE BOX
Patient for HD today. No new events.  Tolerating HD treatments well.  HD prescription reviewed.        PHYSICAL EXAM:      T(C): 36.1, Max: 37.2 (02-10 @ 06:08)  HR: 57 (57 - 68)  BP: 131/70 (112/64 - 131/70)  RR: 18 (17 - 18)  SpO2: 100% (99% - 100%)  Wt(kg): --  Respiratory: clear anteriorly, decreased BS at bases  Cardiovascular: S1 S2  Gastrointestinal: soft NT ND +BS  Extremities:  no  edema                                          8.6    7.0   )-----------( 108      ( 09 Feb 2017 06:51 )             25.3     09 Feb 2017 06:51    144    |  106    |  56     ----------------------------<  145    3.7     |  27     |  5.62     Ca    7.9        09 Feb 2017 06:51  Mg     3.1       09 Feb 2017 06:51        Maintenance hemodialysis.  Blood pressure trends, BFR, AP, , UF goal reviewed.  Await BC species and ID recommendations regarding perm cath.  Clinically stable.

## 2017-02-10 NOTE — PROGRESS NOTE ADULT - SUBJECTIVE AND OBJECTIVE BOX
Patient is a 61y old  Male who presents with a chief complaint of blocked dialysis catheter (07 Feb 2017 19:49)      INTERVAL HPI/OVERNIGHT EVENTS:  Initial blood cultures: gram positive cocci/staphylococcus aureus x 2  Repeat blood cultures ordered  Currently tx with vancomycin and zosyn  Pt admitted with left chest wall perma-cath dysfunction  Pt is for alternate site perma-cath placement once repeat blood cultures show no growth  Pt is afebrile    MEDICATIONS  (STANDING):  piperacillin/tazobactam IVPB. 3.375Gram(s) IV Intermittent every 12 hours  heparin  Injectable 5000Unit(s) SubCutaneous every 12 hours  OXcarbazepine 600milliGRAM(s) Oral two times a day  simvastatin 20milliGRAM(s) Oral at bedtime  risperiDONE   Tablet 1milliGRAM(s) Oral daily  calcium acetate 667milliGRAM(s) Oral three times a day with meals  Nephro-bryant 1Tablet(s) Oral daily  lactobacillus acidophilus 1Tablet(s) Oral daily  levETIRAcetam  Solution 750milliGRAM(s) Oral two times a day  sodium chloride 0.45%. 500milliLiter(s) IV Continuous <Continuous>  midodrine 5milliGRAM(s) Oral every 12 hours  insulin lispro (HumaLOG) corrective regimen sliding scale  SubCutaneous three times a day before meals  dextrose 5%. 1000milliLiter(s) IV Continuous <Continuous>  dextrose 50% Injectable 12.5Gram(s) IV Push once  dextrose 50% Injectable 25Gram(s) IV Push once  dextrose 50% Injectable 25Gram(s) IV Push once  vancomycin  IVPB 1000milliGRAM(s) IV Intermittent once    MEDICATIONS  (PRN):  acetaminophen  Suppository 325milliGRAM(s) Rectal every 6 hours PRN For Temp greater than 38 C (100.4 F)  dextrose Gel 1Dose(s) Oral once PRN Blood Glucose LESS THAN 70 milliGRAM(s)/deciliter  glucagon  Injectable 1milliGRAM(s) IntraMuscular once PRN Glucose LESS THAN 70 milligrams/deciliter        REVIEW OF SYSTEMS:  unable to contribute     Vital Signs Last 24 Hrs  T(C): 36.1, Max: 37.6 (02-09 @ 15:30)  T(F): 97, Max: 99.6 (02-09 @ 15:30)  HR: 57 (57 - 75)  BP: 131/70 (112/64 - 131/70)  BP(mean): --  RR: 18 (17 - 18)  SpO2: 100% (98% - 100%)    PHYSICAL EXAM:  GENERAL: Alert and responsive to stimuli   HEAD:  Atraumatic, Normocephalic  EYES: EOMI, PERRLA, conjunctiva and sclera clear  ENMT: No tonsillar erythema, exudates, or enlargement; Moist mucous membranes   NECK: Supple, No JVD   NERVOUS SYSTEM:  Alert and responsive to stimuli   CHEST/LUNG: Clear to percussion bilaterally; No rales, rhonchi, wheezing, or rubs  HEART: Regular rate and rhythm; No murmurs, rubs, or gallops  ABDOMEN: Soft, Nontender, Nondistended; Bowel sounds present + PEG tube  EXTREMITIES:  Muscle wasting evident in bilateral lower extremities  LYMPH: No lymphadenopathy noted  SKIN: No rashes or lesions    LABS:                        8.6    7.0   )-----------( 108      ( 09 Feb 2017 06:51 )             25.3     09 Feb 2017 06:51    144    |  106    |  56     ----------------------------<  145    3.7     |  27     |  5.62     Ca    7.9        09 Feb 2017 06:51  Mg     3.1       09 Feb 2017 06:51          CAPILLARY BLOOD GLUCOSE  96 (10 Feb 2017 11:28)  109 (10 Feb 2017 07:27)  97 (09 Feb 2017 16:52)      RADIOLOGY & ADDITIONAL TESTS:    Imaging Personally Reviewed:  [ ] YES  [ ] NO    Consultant(s) Notes Reviewed:  [ ] YES  [ ] NO    Care Discussed with Consultants/Other Providers [ ] YES  [ ] NO

## 2017-02-10 NOTE — PROGRESS NOTE ADULT - PROBLEM SELECTOR PLAN 1
Initial blood cultures: +staphylococcus aureus  Continue IV antibiotics   Infectious disease follow up

## 2017-02-10 NOTE — PROGRESS NOTE ADULT - PROBLEM SELECTOR PLAN 1
chencho.  Now blood culture positive for staoh aureus  Send repeat Blood culture  Plan was to place a new permacath which has been postponed now as bactermeia present  redose vanco today  if repeat BC positive the permacath will need to be removed     obviously infected  F/U o wound culture results,blood culture results

## 2017-02-11 LAB
-  AMPICILLIN/SULBACTAM: SIGNIFICANT CHANGE UP
-  CEFAZOLIN: SIGNIFICANT CHANGE UP
-  CIPROFLOXACIN: SIGNIFICANT CHANGE UP
-  CLINDAMYCIN: SIGNIFICANT CHANGE UP
-  ERYTHROMYCIN: SIGNIFICANT CHANGE UP
-  GENTAMICIN: SIGNIFICANT CHANGE UP
-  LEVOFLOXACIN: SIGNIFICANT CHANGE UP
-  MOXIFLOXACIN(AEROBIC): SIGNIFICANT CHANGE UP
-  OXACILLIN: SIGNIFICANT CHANGE UP
-  RIFAMPIN: SIGNIFICANT CHANGE UP
-  TETRACYCLINE: SIGNIFICANT CHANGE UP
-  TRIMETHOPRIM/SULFAMETHOXAZOLE: SIGNIFICANT CHANGE UP
-  VANCOMYCIN: SIGNIFICANT CHANGE UP
CULTURE RESULTS: SIGNIFICANT CHANGE UP
CULTURE RESULTS: SIGNIFICANT CHANGE UP
GRAM STN FLD: SIGNIFICANT CHANGE UP
GRAM STN FLD: SIGNIFICANT CHANGE UP
METHOD TYPE: SIGNIFICANT CHANGE UP
ORGANISM # SPEC MICROSCOPIC CNT: SIGNIFICANT CHANGE UP
ORGANISM # SPEC MICROSCOPIC CNT: SIGNIFICANT CHANGE UP
SPECIMEN SOURCE: SIGNIFICANT CHANGE UP
SPECIMEN SOURCE: SIGNIFICANT CHANGE UP

## 2017-02-11 RX ADMIN — HEPARIN SODIUM 5000 UNIT(S): 5000 INJECTION INTRAVENOUS; SUBCUTANEOUS at 17:02

## 2017-02-11 RX ADMIN — Medication 2: at 08:07

## 2017-02-11 RX ADMIN — RISPERIDONE 1 MILLIGRAM(S): 4 TABLET ORAL at 11:36

## 2017-02-11 RX ADMIN — OXCARBAZEPINE 600 MILLIGRAM(S): 300 TABLET, FILM COATED ORAL at 17:02

## 2017-02-11 RX ADMIN — Medication 667 MILLIGRAM(S): at 08:07

## 2017-02-11 RX ADMIN — Medication 1 TABLET(S): at 17:02

## 2017-02-11 RX ADMIN — Medication 667 MILLIGRAM(S): at 17:03

## 2017-02-11 RX ADMIN — LEVETIRACETAM 750 MILLIGRAM(S): 250 TABLET, FILM COATED ORAL at 17:02

## 2017-02-11 RX ADMIN — Medication 667 MILLIGRAM(S): at 11:37

## 2017-02-11 RX ADMIN — OXCARBAZEPINE 600 MILLIGRAM(S): 300 TABLET, FILM COATED ORAL at 05:54

## 2017-02-11 RX ADMIN — LEVETIRACETAM 750 MILLIGRAM(S): 250 TABLET, FILM COATED ORAL at 05:55

## 2017-02-11 RX ADMIN — SIMVASTATIN 20 MILLIGRAM(S): 20 TABLET, FILM COATED ORAL at 21:56

## 2017-02-11 RX ADMIN — HEPARIN SODIUM 5000 UNIT(S): 5000 INJECTION INTRAVENOUS; SUBCUTANEOUS at 05:54

## 2017-02-11 RX ADMIN — PIPERACILLIN AND TAZOBACTAM 25 GRAM(S): 4; .5 INJECTION, POWDER, LYOPHILIZED, FOR SOLUTION INTRAVENOUS at 17:03

## 2017-02-11 RX ADMIN — PIPERACILLIN AND TAZOBACTAM 25 GRAM(S): 4; .5 INJECTION, POWDER, LYOPHILIZED, FOR SOLUTION INTRAVENOUS at 05:55

## 2017-02-11 RX ADMIN — Medication 1 TABLET(S): at 11:36

## 2017-02-11 RX ADMIN — MIDODRINE HYDROCHLORIDE 5 MILLIGRAM(S): 2.5 TABLET ORAL at 17:02

## 2017-02-11 RX ADMIN — SIMVASTATIN 20 MILLIGRAM(S): 20 TABLET, FILM COATED ORAL at 00:39

## 2017-02-11 NOTE — PROGRESS NOTE ADULT - SUBJECTIVE AND OBJECTIVE BOX
Patient is a 61y old  Male who presents with a chief complaint of blocked dialysis catheter (07 Feb 2017 19:49)      INTERVAL HPI/OVERNIGHT EVENTS:    calm, stable  no new symptoms    MEDICATIONS  (STANDING):  piperacillin/tazobactam IVPB. 3.375Gram(s) IV Intermittent every 12 hours  heparin  Injectable 5000Unit(s) SubCutaneous every 12 hours  OXcarbazepine 600milliGRAM(s) Oral two times a day  simvastatin 20milliGRAM(s) Oral at bedtime  risperiDONE   Tablet 1milliGRAM(s) Oral daily  calcium acetate 667milliGRAM(s) Oral three times a day with meals  Nephro-bryant 1Tablet(s) Oral daily  lactobacillus acidophilus 1Tablet(s) Oral daily  levETIRAcetam  Solution 750milliGRAM(s) Oral two times a day  midodrine 5milliGRAM(s) Oral every 12 hours  insulin lispro (HumaLOG) corrective regimen sliding scale  SubCutaneous three times a day before meals  dextrose 5%. 1000milliLiter(s) IV Continuous <Continuous>  dextrose 50% Injectable 12.5Gram(s) IV Push once  dextrose 50% Injectable 25Gram(s) IV Push once  dextrose 50% Injectable 25Gram(s) IV Push once    MEDICATIONS  (PRN):  acetaminophen  Suppository 325milliGRAM(s) Rectal every 6 hours PRN For Temp greater than 38 C (100.4 F)  dextrose Gel 1Dose(s) Oral once PRN Blood Glucose LESS THAN 70 milliGRAM(s)/deciliter  glucagon  Injectable 1milliGRAM(s) IntraMuscular once PRN Glucose LESS THAN 70 milligrams/deciliter        REVIEW OF SYSTEMS:  CONSTITUTIONAL: No fever, weight loss, or fatigue  EYES: No eye pain, visual disturbances, or discharge  ENMT:  No difficulty hearing, tinnitus, vertigo; No sinus or throat pain  NECK: No pain or stiffness  RESPIRATORY: No cough, wheezing, chills or hemoptysis; No shortness of breath  CARDIOVASCULAR: No chest pain, palpitations, dizziness, or leg swelling  GASTROINTESTINAL: No abdominal or epigastric pain. No nausea, vomiting, or hematemesis; No diarrhea or constipation. No melena or hematochezia.  GENITOURINARY: No dysuria, frequency, hematuria, or incontinence  NEUROLOGICAL: No headaches, memory loss, loss of strength, numbness, or tremors  SKIN: No itching, burning, rashes, or lesions      Vital Signs Last 24 Hrs  T(C): 36.4, Max: 37.7 (02-11 @ 06:11)  T(F): 97.6, Max: 99.8 (02-11 @ 06:11)  HR: 743 (76 - 743)  BP: 137/78 (126/65 - 169/83)  BP(mean): --  RR: 17 (16 - 17)  SpO2: 100% (99% - 100%)    PHYSICAL EXAM:  GENERAL: NAD, well-groomed, well-developed  HEAD:  Atraumatic, Normocephalic  EYES: EOMI, PERRLA, conjunctiva and sclera clear  ENMT: No tonsillar erythema, exudates, or enlargement; Moist mucous membranes   NECK: Supple, No JVD   NERVOUS SYSTEM:  Alert & Oriented X3, Good concentration; Motor Strength 5/5 B/L upper and lower extremities; DTRs 2+ intact and symmetric  CHEST/LUNG: Clear to percussion bilaterally; No rales, rhonchi, wheezing, or rubs  HEART: Regular rate and rhythm; No murmurs, rubs, or gallops  ABDOMEN: Soft, Nontender, Nondistended; Bowel sounds present  EXTREMITIES:  2+ Peripheral Pulses, No clubbing, cyanosis, or edema  LYMPH: No lymphadenopathy noted  SKIN: No rashes or lesions    LABS:              CAPILLARY BLOOD GLUCOSE  145 (11 Feb 2017 16:33)  141 (11 Feb 2017 11:35)  168 (11 Feb 2017 08:06)  121 (10 Feb 2017 21:29)      RADIOLOGY & ADDITIONAL TESTS:    Imaging Personally Reviewed:  [ ] YES  [ ] NO    Consultant(s) Notes Reviewed:  [ ] YES  [ ] NO    Care Discussed with Consultants/Other Providers [ ] YES  [ ] NO

## 2017-02-11 NOTE — PROGRESS NOTE ADULT - SUBJECTIVE AND OBJECTIVE BOX
Patient seen in follow up for ESRD on HD. 61 years old male with history of mental retardation with end stage renal disease and Hypertension admitted with nonfunctional Left chest wall cuffed dialysis catheter and fever. Has left femoral Reddy catheter and was dialyzed yesterday. On antibiotics by I.D. Initial blood cultures reportedly positive for Staph Aureus, subsequent blood cultures are positive for coag. negative staph. Unclear if I.D. recommends Permacath removal, with difficult vascular access the existing catheter may be utilized by vascular surgeon to assist with a new catheter placement. For now patient has old left chest wall Permacath and right femoral Reddy catheter.    Sepsis  Hyperglycemia  Altered mental status  Dialysis patient  Diabetes  Lipids abnormal  Renal failure  Seizure  HTN (hypertension)  Mental retardation    MEDICATIONS  (STANDING):  piperacillin/tazobactam IVPB. 3.375Gram(s) IV Intermittent every 12 hours  heparin  Injectable 5000Unit(s) SubCutaneous every 12 hours  OXcarbazepine 600milliGRAM(s) Oral two times a day  simvastatin 20milliGRAM(s) Oral at bedtime  risperiDONE   Tablet 1milliGRAM(s) Oral daily  calcium acetate 667milliGRAM(s) Oral three times a day with meals  Nephro-bryant 1Tablet(s) Oral daily  lactobacillus acidophilus 1Tablet(s) Oral daily  levETIRAcetam  Solution 750milliGRAM(s) Oral two times a day  midodrine 5milliGRAM(s) Oral every 12 hours  insulin lispro (HumaLOG) corrective regimen sliding scale  SubCutaneous three times a day before meals  dextrose 5%. 1000milliLiter(s) IV Continuous <Continuous>  dextrose 50% Injectable 12.5Gram(s) IV Push once  dextrose 50% Injectable 25Gram(s) IV Push once  dextrose 50% Injectable 25Gram(s) IV Push once    MEDICATIONS  (PRN):  acetaminophen  Suppository 325milliGRAM(s) Rectal every 6 hours PRN For Temp greater than 38 C (100.4 F)  dextrose Gel 1Dose(s) Oral once PRN Blood Glucose LESS THAN 70 milliGRAM(s)/deciliter  glucagon  Injectable 1milliGRAM(s) IntraMuscular once PRN Glucose LESS THAN 70 milligrams/deciliter    T(C): 37.3, Max: 37.7 (02-11 @ 06:11)  HR: 78 (57 - 743)  BP: 127/71 (126/65 - 169/83)  RR: 18 (16 - 18)  SpO2: 99% (98% - 100%)  Wt(kg): --  I&O's Detail  I & Os for 24h ending 11 Feb 2017 07:00  =============================================  IN:    Nepro: 540 ml    Total IN: 540 ml  ---------------------------------------------  OUT:    Other: 1500 ml    Total OUT: 1500 ml  ---------------------------------------------  Total NET: -960 ml    I & Os for current day (as of 11 Feb 2017 22:23)  =============================================  IN:    Solution: 100 ml    Total IN: 100 ml  ---------------------------------------------  OUT:    Total OUT: 0 ml  ---------------------------------------------  Total NET: 100 ml      PHYSICAL EXAM:  General: NAD, nonverbal, on tube feeds.  Respiratory: b/l air entry, clear  Cardiovascular: S1 S2 regular  Gastrointestinal: soft, not tender, feeding tube in place.  Extremities: no edema                Sodium, Serum: 144 (02-09 @ 06:51)    Creatinine, Serum: 5.62 (02-09 @ 06:51)    Potassium, Serum: 3.7 (02-09 @ 06:51)    Hemoglobin: 8.6 (02-09 @ 06:51)

## 2017-02-11 NOTE — PROGRESS NOTE ADULT - ASSESSMENT
Next dialysis on 2/13/17. Continue antibiotics as per I.D.  I.D. and vascular surgery need to discuss dialysis acces options/plans.

## 2017-02-12 RX ORDER — ERYTHROPOIETIN 10000 [IU]/ML
10000 INJECTION, SOLUTION INTRAVENOUS; SUBCUTANEOUS
Qty: 0 | Refills: 0 | Status: DISCONTINUED | OUTPATIENT
Start: 2017-02-12 | End: 2017-02-14

## 2017-02-12 RX ADMIN — PIPERACILLIN AND TAZOBACTAM 25 GRAM(S): 4; .5 INJECTION, POWDER, LYOPHILIZED, FOR SOLUTION INTRAVENOUS at 17:03

## 2017-02-12 RX ADMIN — Medication 1 TABLET(S): at 12:14

## 2017-02-12 RX ADMIN — MIDODRINE HYDROCHLORIDE 5 MILLIGRAM(S): 2.5 TABLET ORAL at 17:03

## 2017-02-12 RX ADMIN — PIPERACILLIN AND TAZOBACTAM 25 GRAM(S): 4; .5 INJECTION, POWDER, LYOPHILIZED, FOR SOLUTION INTRAVENOUS at 05:15

## 2017-02-12 RX ADMIN — RISPERIDONE 1 MILLIGRAM(S): 4 TABLET ORAL at 12:15

## 2017-02-12 RX ADMIN — SIMVASTATIN 20 MILLIGRAM(S): 20 TABLET, FILM COATED ORAL at 23:24

## 2017-02-12 RX ADMIN — OXCARBAZEPINE 600 MILLIGRAM(S): 300 TABLET, FILM COATED ORAL at 17:03

## 2017-02-12 RX ADMIN — LEVETIRACETAM 750 MILLIGRAM(S): 250 TABLET, FILM COATED ORAL at 05:17

## 2017-02-12 RX ADMIN — Medication 667 MILLIGRAM(S): at 12:14

## 2017-02-12 RX ADMIN — OXCARBAZEPINE 600 MILLIGRAM(S): 300 TABLET, FILM COATED ORAL at 05:17

## 2017-02-12 RX ADMIN — HEPARIN SODIUM 5000 UNIT(S): 5000 INJECTION INTRAVENOUS; SUBCUTANEOUS at 05:18

## 2017-02-12 RX ADMIN — Medication 667 MILLIGRAM(S): at 17:03

## 2017-02-12 RX ADMIN — HEPARIN SODIUM 5000 UNIT(S): 5000 INJECTION INTRAVENOUS; SUBCUTANEOUS at 17:02

## 2017-02-12 RX ADMIN — LEVETIRACETAM 750 MILLIGRAM(S): 250 TABLET, FILM COATED ORAL at 17:02

## 2017-02-12 RX ADMIN — Medication 667 MILLIGRAM(S): at 07:56

## 2017-02-12 NOTE — PROGRESS NOTE ADULT - ASSESSMENT
Next dialysis tomorrow. Continue antibiotics as per I.D.  To discuss with I.D. and vascular surgery tomorrow dialysis access plans.

## 2017-02-12 NOTE — PROGRESS NOTE ADULT - SUBJECTIVE AND OBJECTIVE BOX
Patient seen in follow up for ESRD on HD.   61 years old male with history of mental retardation with end stage renal disease and Hypertension admitted with nonfunctional Left chest wall cuffed dialysis catheter and fever. Has left femoral Reddy catheter and was dialyzed on 2/10/17. On antibiotics by I.D. Initial blood cultures reportedly positive for Staph Aureus (I do not see Staph Aureus blood cultures report, only Coag Negative Staph). Unclear if I.D. recommends Permacath removal. Patient is afebrile and blood cultures could be contaminant in which case new Permacath can be placed over the guidewire (d/w Dr. Franklin).      Sepsis associated with vascular access catheter, subsequent encounter  Hyperglycemia  Altered mental status  Dialysis patient  Diabetes  Lipids abnormal  Renal failure  Seizure  HTN (hypertension)  Mental retardation    MEDICATIONS  (STANDING):  piperacillin/tazobactam IVPB. 3.375Gram(s) IV Intermittent every 12 hours  heparin  Injectable 5000Unit(s) SubCutaneous every 12 hours  OXcarbazepine 600milliGRAM(s) Oral two times a day  simvastatin 20milliGRAM(s) Oral at bedtime  risperiDONE   Tablet 1milliGRAM(s) Oral daily  calcium acetate 667milliGRAM(s) Oral three times a day with meals  Nephro-bryant 1Tablet(s) Oral daily  lactobacillus acidophilus 1Tablet(s) Oral daily  levETIRAcetam  Solution 750milliGRAM(s) Oral two times a day  midodrine 5milliGRAM(s) Oral every 12 hours  insulin lispro (HumaLOG) corrective regimen sliding scale  SubCutaneous three times a day before meals  dextrose 5%. 1000milliLiter(s) IV Continuous <Continuous>  dextrose 50% Injectable 12.5Gram(s) IV Push once  dextrose 50% Injectable 25Gram(s) IV Push once  dextrose 50% Injectable 25Gram(s) IV Push once    MEDICATIONS  (PRN):  acetaminophen  Suppository 325milliGRAM(s) Rectal every 6 hours PRN For Temp greater than 38 C (100.4 F)  dextrose Gel 1Dose(s) Oral once PRN Blood Glucose LESS THAN 70 milliGRAM(s)/deciliter  glucagon  Injectable 1milliGRAM(s) IntraMuscular once PRN Glucose LESS THAN 70 milligrams/deciliter    T(C): 37.2, Max: 37.6 (02-12 @ 00:20)  HR: 76 (76 - 743)  BP: 132/72 (125/70 - 142/75)  RR: 16 (16 - 18)  SpO2: 100% (98% - 100%)  Wt(kg): --  I&O's Detail    I & Os for current day (as of 12 Feb 2017 22:24)  =============================================  IN:    Nepro: 480 ml    Solution: 200 ml    Total IN: 680 ml  ---------------------------------------------  OUT:    Total OUT: 0 ml  ---------------------------------------------  Total NET: 680 ml      PHYSICAL EXAM:  General: NAD, nonverbal, on tube feeds.  Respiratory: b/l air entry, clear  Cardiovascular: S1 S2 regular  Gastrointestinal: soft, not tender, feeding tube in place.  Extremities: no edema

## 2017-02-12 NOTE — PROGRESS NOTE ADULT - SUBJECTIVE AND OBJECTIVE BOX
Patient is a 61y old  Male who presents with a chief complaint of blocked dialysis catheter (07 Feb 2017 19:49)      INTERVAL HPI/OVERNIGHT EVENTS:  clinically stable  no fever  s/p blocked Permacath .No open post cathflo instillation  Blood culture with staph coagulase pos    MEDICATIONS  (STANDING):  piperacillin/tazobactam IVPB. 3.375Gram(s) IV Intermittent every 12 hours  heparin  Injectable 5000Unit(s) SubCutaneous every 12 hours  OXcarbazepine 600milliGRAM(s) Oral two times a day  simvastatin 20milliGRAM(s) Oral at bedtime  risperiDONE   Tablet 1milliGRAM(s) Oral daily  calcium acetate 667milliGRAM(s) Oral three times a day with meals  Nephro-bryant 1Tablet(s) Oral daily  lactobacillus acidophilus 1Tablet(s) Oral daily  levETIRAcetam  Solution 750milliGRAM(s) Oral two times a day  midodrine 5milliGRAM(s) Oral every 12 hours  insulin lispro (HumaLOG) corrective regimen sliding scale  SubCutaneous three times a day before meals  dextrose 5%. 1000milliLiter(s) IV Continuous <Continuous>  dextrose 50% Injectable 12.5Gram(s) IV Push once  dextrose 50% Injectable 25Gram(s) IV Push once  dextrose 50% Injectable 25Gram(s) IV Push once    MEDICATIONS  (PRN):  acetaminophen  Suppository 325milliGRAM(s) Rectal every 6 hours PRN For Temp greater than 38 C (100.4 F)  dextrose Gel 1Dose(s) Oral once PRN Blood Glucose LESS THAN 70 milliGRAM(s)/deciliter  glucagon  Injectable 1milliGRAM(s) IntraMuscular once PRN Glucose LESS THAN 70 milligrams/deciliter        REVIEW OF SYSTEMS:  CONSTITUTIONAL: No fever, weight loss, or fatigue  EYES: No eye pain, visual disturbances, or discharge  ENMT:  No difficulty hearing, tinnitus, vertigo; No sinus or throat pain  NECK: No pain or stiffness  RESPIRATORY: No cough, wheezing, chills or hemoptysis; No shortness of breath  CARDIOVASCULAR: No chest pain, palpitations, dizziness, or leg swelling  GASTROINTESTINAL: No abdominal or epigastric pain. No nausea, vomiting, or hematemesis; No diarrhea or constipation. No melena or hematochezia.  GENITOURINARY: No dysuria, frequency, hematuria, or incontinence  NEUROLOGICAL: No headaches, memory loss, loss of strength, numbness, or tremors  SKIN: No itching, burning, rashes, or lesions      Vital Signs Last 24 Hrs  T(C): 36.7, Max: 37.6 (02-12 @ 00:20)  T(F): 98, Max: 99.6 (02-12 @ 00:20)  HR: 98 (77 - 98)  BP: 142/75 (125/70 - 142/75)  BP(mean): --  RR: 17 (16 - 18)  SpO2: 100% (98% - 100%)    PHYSICAL EXAM:  GENERAL: NAD, well-groomed, well-developed  HEAD:  Atraumatic, Normocephalic  EYES: EOMI, PERRLA, conjunctiva and sclera clear  ENMT: No tonsillar erythema, exudates, or enlargement; Moist mucous membranes   NECK: Supple, No JVD   NERVOUS SYSTEM:  Alert & Oriented X3, Good concentration; Motor Strength 5/5 B/L upper and lower extremities; DTRs 2+ intact and symmetric  CHEST/LUNG: Clear to percussion bilaterally; No rales, rhonchi, wheezing, or rubs, left chest waal permacath  HEART: Regular rate and rhythm; No murmurs, rubs, or gallops  ABDOMEN: Soft, Nontender, Nondistended; Bowel sounds present, rt femoral vascath  EXTREMITIES:  2+ Peripheral Pulses, No clubbing, cyanosis, or edema  LYMPH: No lymphadenopathy noted  SKIN: No rashes or lesions    LABS:              CAPILLARY BLOOD GLUCOSE  145 (12 Feb 2017 16:17)  117 (12 Feb 2017 07:55)  132 (11 Feb 2017 23:44)      RADIOLOGY & ADDITIONAL TESTS:    Imaging Personally Reviewed:  [ ] YES  [ ] NO    Consultant(s) Notes Reviewed:  [ ] YES  [ ] NO    Care Discussed with Consultants/Other Providers [ ] YES  [ ] NO

## 2017-02-13 ENCOUNTER — RESULT REVIEW (OUTPATIENT)
Age: 62
End: 2017-02-13

## 2017-02-13 DIAGNOSIS — R78.81 BACTEREMIA: ICD-10-CM

## 2017-02-13 LAB
ALBUMIN SERPL ELPH-MCNC: 1.8 G/DL — LOW (ref 3.3–5)
ALP SERPL-CCNC: 87 U/L — SIGNIFICANT CHANGE UP (ref 40–120)
ALT FLD-CCNC: 27 U/L — SIGNIFICANT CHANGE UP (ref 12–78)
ANION GAP SERPL CALC-SCNC: 12 MMOL/L — SIGNIFICANT CHANGE UP (ref 5–17)
APTT BLD: 29.7 SEC — SIGNIFICANT CHANGE UP (ref 27.5–37.4)
AST SERPL-CCNC: 27 U/L — SIGNIFICANT CHANGE UP (ref 15–37)
BILIRUB SERPL-MCNC: 0.3 MG/DL — SIGNIFICANT CHANGE UP (ref 0.2–1.2)
BUN SERPL-MCNC: 44 MG/DL — HIGH (ref 7–23)
CALCIUM SERPL-MCNC: 8.4 MG/DL — LOW (ref 8.5–10.1)
CHLORIDE SERPL-SCNC: 99 MMOL/L — SIGNIFICANT CHANGE UP (ref 96–108)
CO2 SERPL-SCNC: 28 MMOL/L — SIGNIFICANT CHANGE UP (ref 22–31)
CREAT SERPL-MCNC: 4.47 MG/DL — HIGH (ref 0.5–1.3)
GLUCOSE SERPL-MCNC: 117 MG/DL — HIGH (ref 70–99)
HCT VFR BLD CALC: 24.8 % — LOW (ref 39–50)
HGB BLD-MCNC: 8.4 G/DL — LOW (ref 13–17)
INR BLD: 1.08 RATIO — SIGNIFICANT CHANGE UP (ref 0.88–1.16)
MCHC RBC-ENTMCNC: 30.6 PG — SIGNIFICANT CHANGE UP (ref 27–34)
MCHC RBC-ENTMCNC: 34 GM/DL — SIGNIFICANT CHANGE UP (ref 32–36)
MCV RBC AUTO: 90.2 FL — SIGNIFICANT CHANGE UP (ref 80–100)
PHOSPHATE SERPL-MCNC: 2.1 MG/DL — LOW (ref 2.5–4.5)
PLATELET # BLD AUTO: 317 K/UL — SIGNIFICANT CHANGE UP (ref 150–400)
POTASSIUM SERPL-MCNC: 3.1 MMOL/L — LOW (ref 3.5–5.3)
POTASSIUM SERPL-SCNC: 3.1 MMOL/L — LOW (ref 3.5–5.3)
PROT SERPL-MCNC: 6.7 GM/DL — SIGNIFICANT CHANGE UP (ref 6–8.3)
PROTHROM AB SERPL-ACNC: 12.1 SEC — SIGNIFICANT CHANGE UP (ref 10–13.1)
RBC # BLD: 2.74 M/UL — LOW (ref 4.2–5.8)
RBC # FLD: 13.9 % — SIGNIFICANT CHANGE UP (ref 11–15)
SODIUM SERPL-SCNC: 139 MMOL/L — SIGNIFICANT CHANGE UP (ref 135–145)
WBC # BLD: 12.8 K/UL — HIGH (ref 3.8–10.5)
WBC # FLD AUTO: 12.8 K/UL — HIGH (ref 3.8–10.5)

## 2017-02-13 PROCEDURE — 99233 SBSQ HOSP IP/OBS HIGH 50: CPT

## 2017-02-13 RX ORDER — VANCOMYCIN HCL 1 G
1000 VIAL (EA) INTRAVENOUS ONCE
Qty: 0 | Refills: 0 | Status: COMPLETED | OUTPATIENT
Start: 2017-02-13 | End: 2017-02-13

## 2017-02-13 RX ORDER — SODIUM,POTASSIUM PHOSPHATES 278-250MG
1 POWDER IN PACKET (EA) ORAL
Qty: 0 | Refills: 0 | Status: DISCONTINUED | OUTPATIENT
Start: 2017-02-13 | End: 2017-02-14

## 2017-02-13 RX ADMIN — OXCARBAZEPINE 600 MILLIGRAM(S): 300 TABLET, FILM COATED ORAL at 22:41

## 2017-02-13 RX ADMIN — HEPARIN SODIUM 5000 UNIT(S): 5000 INJECTION INTRAVENOUS; SUBCUTANEOUS at 22:42

## 2017-02-13 RX ADMIN — LEVETIRACETAM 750 MILLIGRAM(S): 250 TABLET, FILM COATED ORAL at 22:41

## 2017-02-13 RX ADMIN — PIPERACILLIN AND TAZOBACTAM 25 GRAM(S): 4; .5 INJECTION, POWDER, LYOPHILIZED, FOR SOLUTION INTRAVENOUS at 05:37

## 2017-02-13 RX ADMIN — Medication 250 MILLIGRAM(S): at 22:41

## 2017-02-13 RX ADMIN — OXCARBAZEPINE 600 MILLIGRAM(S): 300 TABLET, FILM COATED ORAL at 05:37

## 2017-02-13 RX ADMIN — SIMVASTATIN 20 MILLIGRAM(S): 20 TABLET, FILM COATED ORAL at 22:41

## 2017-02-13 RX ADMIN — LEVETIRACETAM 750 MILLIGRAM(S): 250 TABLET, FILM COATED ORAL at 05:37

## 2017-02-13 RX ADMIN — ERYTHROPOIETIN 10000 UNIT(S): 10000 INJECTION, SOLUTION INTRAVENOUS; SUBCUTANEOUS at 18:43

## 2017-02-13 RX ADMIN — Medication 1 TABLET(S): at 22:40

## 2017-02-13 RX ADMIN — RISPERIDONE 1 MILLIGRAM(S): 4 TABLET ORAL at 22:41

## 2017-02-13 NOTE — PROGRESS NOTE ADULT - ASSESSMENT
Remains stable  staph coagulase neg contamination Vs real. Non infectious presentation  Technically difficult catheter placement  Would prefer catheter change over wire and dc - follow up with id/vascular

## 2017-02-13 NOTE — PROGRESS NOTE ADULT - SUBJECTIVE AND OBJECTIVE BOX
Patient is a 61y old  Male who presents with a chief complaint of blocked dialysis catheter (07 Feb 2017 19:49)      INTERVAL HPI / OVERNIGHT EVENTS:awake,non verbal ,no new events    MEDICATIONS  (STANDING):  heparin  Injectable 5000Unit(s) SubCutaneous every 12 hours  OXcarbazepine 600milliGRAM(s) Oral two times a day  simvastatin 20milliGRAM(s) Oral at bedtime  risperiDONE   Tablet 1milliGRAM(s) Oral daily  calcium acetate 667milliGRAM(s) Oral three times a day with meals  Nephro-bryant 1Tablet(s) Oral daily  lactobacillus acidophilus 1Tablet(s) Oral daily  levETIRAcetam  Solution 750milliGRAM(s) Oral two times a day  midodrine 5milliGRAM(s) Oral every 12 hours  insulin lispro (HumaLOG) corrective regimen sliding scale  SubCutaneous three times a day before meals  dextrose 5%. 1000milliLiter(s) IV Continuous <Continuous>  dextrose 50% Injectable 12.5Gram(s) IV Push once  dextrose 50% Injectable 25Gram(s) IV Push once  dextrose 50% Injectable 25Gram(s) IV Push once  epoetin ganes Injectable 13987Kima(s) IV Push <User Schedule>  potassium acid phosphate/sodium acid phosphate tablet (K-PHOS No. 2) 1Tablet(s) Oral four times a day with meals    MEDICATIONS  (PRN):  acetaminophen  Suppository 325milliGRAM(s) Rectal every 6 hours PRN For Temp greater than 38 C (100.4 F)  dextrose Gel 1Dose(s) Oral once PRN Blood Glucose LESS THAN 70 milliGRAM(s)/deciliter  glucagon  Injectable 1milliGRAM(s) IntraMuscular once PRN Glucose LESS THAN 70 milligrams/deciliter      Vital Signs Last 24 Hrs  T(C): 37.7, Max: 37.7 (02-13 @ 23:15)  T(F): 99.9, Max: 99.9 (02-13 @ 23:15)  HR: 110 (81 - 110)  BP: 160/87 (124/71 - 184/92)  BP(mean): --  RR: 179 (16 - 179)  SpO2: 98% (98% - 100%)    PHYSICAL EXAM:    Constitutional: NAD, well-groomed, well-developed  Respiratory: CTAB/L  Cardiovascular: S1 and S2, RRR, no M/G/R  Gastrointestinal: BS+, soft, NT/ND  Extremities: No peripheral edema  Vascular: 2+ peripheral pulses  Skin: sacral decub-clean base  Lines left chest permacath    LABS:                        8.4    12.8  )-----------( 317      ( 13 Feb 2017 17:11 )             24.8     13 Feb 2017 17:11    139    |  99     |  44     ----------------------------<  117    3.1     |  28     |  4.47     Ca    8.4        13 Feb 2017 17:11  Phos  2.1       13 Feb 2017 17:11    TPro  6.7    /  Alb  1.8    /  TBili  0.3    /  DBili  x      /  AST  27     /  ALT  27     /  AlkPhos  87     13 Feb 2017 17:11    PT/INR - ( 13 Feb 2017 17:11 )   PT: 12.1 sec;   INR: 1.08 ratio         PTT - ( 13 Feb 2017 17:11 )  PTT:29.7 sec        MICROBIOLOGY:  RECENT CULTURES:  02-08 .Urine Catheterized XXXX XXXX   No growth    02-08 .Abscess sacral decub wound Methicillin resistant Staphylococcus aureus XXXX   Rare Methicillin resistant Staphylococcus aureus  Few Presumptive Candida albicans  Rare Enterococcus species    02-08 .Blood Blood-Peripheral Coag Negative Staphylococcus   Growth in aerobic bottle:  Gram Positive Cocci in Clusters   Growth in aerobic bottle:  Coag Negative Staphylococcus          RADIOLOGY & ADDITIONAL STUDIES:

## 2017-02-13 NOTE — PROGRESS NOTE ADULT - PROBLEM SELECTOR PLAN 1
likely contaminant but as pt has cath awaiting for repeat BC (ordered multple times on last few days but hasn't been drawn as of now)  cotn [facility]anco post HD for now  Noted to have leukocytosis also today  repeat CBC in am

## 2017-02-13 NOTE — PROGRESS NOTE ADULT - SUBJECTIVE AND OBJECTIVE BOX
Patient for HD today. No new events.  Tolerating HD treatments well.  HD prescription reviewed.          PHYSICAL EXAM:      T(C): 37.1, Max: 37.3 (02-12 @ 23:50)  HR: 85 (75 - 85)  BP: 150/86 (132/72 - 157/87)  RR: 16 (16 - 16)  SpO2: 100% (100% - 100%)  Wt(kg): --  Respiratory: clear anteriorly, decreased BS at bases  Cardiovascular: S1 S2  Gastrointestinal: soft NT ND +BS  Extremities:   tr edema                              Maintenance hemodialysis.  ID to decide on catheter exchange.  Blood pressure trends, BFR, AP, , UF goal reviewed.  Clinically stable.

## 2017-02-13 NOTE — PROGRESS NOTE ADULT - SUBJECTIVE AND OBJECTIVE BOX
Patient is a 61y old  Male who presents with a chief complaint of blocked dialysis catheter (07 Feb 2017 19:49)      INTERVAL HPI/OVERNIGHT EVENTS:  clinically stable  no follow up blood culture available  afebrile, no distress    MEDICATIONS  (STANDING):  heparin  Injectable 5000Unit(s) SubCutaneous every 12 hours  OXcarbazepine 600milliGRAM(s) Oral two times a day  simvastatin 20milliGRAM(s) Oral at bedtime  risperiDONE   Tablet 1milliGRAM(s) Oral daily  calcium acetate 667milliGRAM(s) Oral three times a day with meals  Nephro-bryant 1Tablet(s) Oral daily  lactobacillus acidophilus 1Tablet(s) Oral daily  levETIRAcetam  Solution 750milliGRAM(s) Oral two times a day  midodrine 5milliGRAM(s) Oral every 12 hours  insulin lispro (HumaLOG) corrective regimen sliding scale  SubCutaneous three times a day before meals  dextrose 5%. 1000milliLiter(s) IV Continuous <Continuous>  dextrose 50% Injectable 12.5Gram(s) IV Push once  dextrose 50% Injectable 25Gram(s) IV Push once  dextrose 50% Injectable 25Gram(s) IV Push once  epoetin agnes Injectable 62708Uyot(s) IV Push <User Schedule>  vancomycin  IVPB 1000milliGRAM(s) IV Intermittent once  potassium acid phosphate/sodium acid phosphate tablet (K-PHOS No. 2) 1Tablet(s) Oral four times a day with meals    MEDICATIONS  (PRN):  acetaminophen  Suppository 325milliGRAM(s) Rectal every 6 hours PRN For Temp greater than 38 C (100.4 F)  dextrose Gel 1Dose(s) Oral once PRN Blood Glucose LESS THAN 70 milliGRAM(s)/deciliter  glucagon  Injectable 1milliGRAM(s) IntraMuscular once PRN Glucose LESS THAN 70 milligrams/deciliter        REVIEW OF SYSTEMS:  CONSTITUTIONAL: No fever, weight loss, or fatigue  EYES: No eye pain, visual disturbances, or discharge  ENMT:  No difficulty hearing, tinnitus, vertigo; No sinus or throat pain  NECK: No pain or stiffness  RESPIRATORY: No cough, wheezing, chills or hemoptysis; No shortness of breath  CARDIOVASCULAR: No chest pain, palpitations, dizziness, or leg swelling  GASTROINTESTINAL: No abdominal or epigastric pain. No nausea, vomiting, or hematemesis; No diarrhea or constipation. No melena or hematochezia.  GENITOURINARY: No dysuria, frequency, hematuria, or incontinence  NEUROLOGICAL: No headaches, memory loss, loss of strength, numbness, or tremors  SKIN: No itching, burning, rashes, or lesions      Vital Signs Last 24 Hrs  T(C): 37.6, Max: 37.6 (02-13 @ 21:00)  T(F): 99.6, Max: 99.6 (02-13 @ 21:00)  HR: 110 (75 - 110)  BP: 163/79 (124/71 - 184/92)  BP(mean): --  RR: 17 (16 - 17)  SpO2: 98% (98% - 100%)    PHYSICAL EXAM:  GENERAL: NAD, well-groomed, well-developed  HEAD:  Atraumatic, Normocephalic  EYES: EOMI, PERRLA, conjunctiva and sclera clear  ENMT: No tonsillar erythema, exudates, or enlargement; Moist mucous membranes   NECK: Supple, No JVD   NERVOUS SYSTEM:  Alert & Oriented X3, Good concentration; Motor Strength 5/5 B/L upper and lower extremities; DTRs 2+ intact and symmetric  CHEST/LUNG: Clear to percussion bilaterally; No rales, rhonchi, wheezing, or rubs  HEART: Regular rate and rhythm; No murmurs, rubs, or gallops  ABDOMEN: Soft, Nontender, Nondistended; Bowel sounds present  EXTREMITIES:  2+ Peripheral Pulses, No clubbing, cyanosis, or edema  LYMPH: No lymphadenopathy noted  SKIN: No rashes or lesions    LABS:                        8.4    12.8  )-----------( 317      ( 13 Feb 2017 17:11 )             24.8     13 Feb 2017 17:11    139    |  99     |  44     ----------------------------<  117    3.1     |  28     |  4.47     Ca    8.4        13 Feb 2017 17:11  Phos  2.1       13 Feb 2017 17:11    TPro  6.7    /  Alb  1.8    /  TBili  0.3    /  DBili  x      /  AST  27     /  ALT  27     /  AlkPhos  87     13 Feb 2017 17:11    PT/INR - ( 13 Feb 2017 17:11 )   PT: 12.1 sec;   INR: 1.08 ratio         PTT - ( 13 Feb 2017 17:11 )  PTT:29.7 sec    CAPILLARY BLOOD GLUCOSE  100 (13 Feb 2017 15:46)  90 (13 Feb 2017 12:36)  87 (13 Feb 2017 08:33)      RADIOLOGY & ADDITIONAL TESTS:    Imaging Personally Reviewed:  [ ] YES  [ ] NO    Consultant(s) Notes Reviewed:  [ ] YES  [ ] NO    Care Discussed with Consultants/Other Providers [ ] YES  [ ] NO

## 2017-02-14 ENCOUNTER — TRANSCRIPTION ENCOUNTER (OUTPATIENT)
Age: 62
End: 2017-02-14

## 2017-02-14 LAB
-  AMPICILLIN: SIGNIFICANT CHANGE UP
-  CIPROFLOXACIN: SIGNIFICANT CHANGE UP
-  ERYTHROMYCIN: SIGNIFICANT CHANGE UP
-  TETRACYCLINE: SIGNIFICANT CHANGE UP
-  VANCOMYCIN: SIGNIFICANT CHANGE UP
ANION GAP SERPL CALC-SCNC: 11 MMOL/L — SIGNIFICANT CHANGE UP (ref 5–17)
BUN SERPL-MCNC: 28 MG/DL — HIGH (ref 7–23)
CALCIUM SERPL-MCNC: 7.8 MG/DL — LOW (ref 8.5–10.1)
CHLORIDE SERPL-SCNC: 103 MMOL/L — SIGNIFICANT CHANGE UP (ref 96–108)
CO2 SERPL-SCNC: 25 MMOL/L — SIGNIFICANT CHANGE UP (ref 22–31)
CREAT SERPL-MCNC: 3.09 MG/DL — HIGH (ref 0.5–1.3)
CULTURE RESULTS: SIGNIFICANT CHANGE UP
GLUCOSE SERPL-MCNC: 146 MG/DL — HIGH (ref 70–99)
MAGNESIUM SERPL-MCNC: 2.5 MG/DL — HIGH (ref 1.8–2.4)
METHOD TYPE: SIGNIFICANT CHANGE UP
ORGANISM # SPEC MICROSCOPIC CNT: SIGNIFICANT CHANGE UP
PHOSPHATE SERPL-MCNC: 2.9 MG/DL — SIGNIFICANT CHANGE UP (ref 2.5–4.5)
POTASSIUM SERPL-MCNC: 3.9 MMOL/L — SIGNIFICANT CHANGE UP (ref 3.5–5.3)
POTASSIUM SERPL-SCNC: 3.9 MMOL/L — SIGNIFICANT CHANGE UP (ref 3.5–5.3)
SODIUM SERPL-SCNC: 139 MMOL/L — SIGNIFICANT CHANGE UP (ref 135–145)
SPECIMEN SOURCE: SIGNIFICANT CHANGE UP

## 2017-02-14 PROCEDURE — 88300 SURGICAL PATH GROSS: CPT | Mod: 26

## 2017-02-14 PROCEDURE — 71010: CPT | Mod: 26

## 2017-02-14 PROCEDURE — 99233 SBSQ HOSP IP/OBS HIGH 50: CPT

## 2017-02-14 RX ORDER — HEPARIN SODIUM 5000 [USP'U]/ML
5000 INJECTION INTRAVENOUS; SUBCUTANEOUS EVERY 12 HOURS
Qty: 0 | Refills: 0 | Status: DISCONTINUED | OUTPATIENT
Start: 2017-02-14 | End: 2017-02-17

## 2017-02-14 RX ORDER — GLUCAGON INJECTION, SOLUTION 0.5 MG/.1ML
1 INJECTION, SOLUTION SUBCUTANEOUS ONCE
Qty: 0 | Refills: 0 | Status: DISCONTINUED | OUTPATIENT
Start: 2017-02-14 | End: 2017-02-17

## 2017-02-14 RX ORDER — SODIUM CHLORIDE 9 MG/ML
1000 INJECTION, SOLUTION INTRAVENOUS
Qty: 0 | Refills: 0 | Status: DISCONTINUED | OUTPATIENT
Start: 2017-02-14 | End: 2017-02-17

## 2017-02-14 RX ORDER — RISPERIDONE 4 MG/1
1 TABLET ORAL DAILY
Qty: 0 | Refills: 0 | Status: DISCONTINUED | OUTPATIENT
Start: 2017-02-14 | End: 2017-02-17

## 2017-02-14 RX ORDER — DEXTROSE 50 % IN WATER 50 %
1 SYRINGE (ML) INTRAVENOUS ONCE
Qty: 0 | Refills: 0 | Status: DISCONTINUED | OUTPATIENT
Start: 2017-02-14 | End: 2017-02-17

## 2017-02-14 RX ORDER — LEVETIRACETAM 250 MG/1
750 TABLET, FILM COATED ORAL
Qty: 0 | Refills: 0 | Status: DISCONTINUED | OUTPATIENT
Start: 2017-02-14 | End: 2017-02-14

## 2017-02-14 RX ORDER — LEVETIRACETAM 250 MG/1
750 TABLET, FILM COATED ORAL
Qty: 0 | Refills: 0 | Status: DISCONTINUED | OUTPATIENT
Start: 2017-02-14 | End: 2017-02-17

## 2017-02-14 RX ORDER — ACETAMINOPHEN 500 MG
325 TABLET ORAL EVERY 6 HOURS
Qty: 0 | Refills: 0 | Status: DISCONTINUED | OUTPATIENT
Start: 2017-02-14 | End: 2017-02-17

## 2017-02-14 RX ORDER — SODIUM CHLORIDE 9 MG/ML
3 INJECTION INTRAMUSCULAR; INTRAVENOUS; SUBCUTANEOUS EVERY 8 HOURS
Qty: 0 | Refills: 0 | Status: DISCONTINUED | OUTPATIENT
Start: 2017-02-14 | End: 2017-02-17

## 2017-02-14 RX ORDER — ERYTHROPOIETIN 10000 [IU]/ML
10000 INJECTION, SOLUTION INTRAVENOUS; SUBCUTANEOUS
Qty: 0 | Refills: 0 | Status: DISCONTINUED | OUTPATIENT
Start: 2017-02-14 | End: 2017-02-17

## 2017-02-14 RX ORDER — CALCIUM ACETATE 667 MG
667 TABLET ORAL
Qty: 0 | Refills: 0 | Status: DISCONTINUED | OUTPATIENT
Start: 2017-02-14 | End: 2017-02-17

## 2017-02-14 RX ORDER — SODIUM,POTASSIUM PHOSPHATES 278-250MG
1 POWDER IN PACKET (EA) ORAL
Qty: 0 | Refills: 0 | Status: COMPLETED | OUTPATIENT
Start: 2017-02-14 | End: 2017-02-15

## 2017-02-14 RX ORDER — DEXTROSE 50 % IN WATER 50 %
12.5 SYRINGE (ML) INTRAVENOUS ONCE
Qty: 0 | Refills: 0 | Status: DISCONTINUED | OUTPATIENT
Start: 2017-02-14 | End: 2017-02-17

## 2017-02-14 RX ORDER — MIDODRINE HYDROCHLORIDE 2.5 MG/1
5 TABLET ORAL EVERY 12 HOURS
Qty: 0 | Refills: 0 | Status: DISCONTINUED | OUTPATIENT
Start: 2017-02-14 | End: 2017-02-17

## 2017-02-14 RX ORDER — LACTOBACILLUS ACIDOPHILUS 100MM CELL
1 CAPSULE ORAL DAILY
Qty: 0 | Refills: 0 | Status: DISCONTINUED | OUTPATIENT
Start: 2017-02-14 | End: 2017-02-17

## 2017-02-14 RX ORDER — OXCARBAZEPINE 300 MG/1
600 TABLET, FILM COATED ORAL
Qty: 0 | Refills: 0 | Status: DISCONTINUED | OUTPATIENT
Start: 2017-02-14 | End: 2017-02-17

## 2017-02-14 RX ORDER — INSULIN LISPRO 100/ML
VIAL (ML) SUBCUTANEOUS
Qty: 0 | Refills: 0 | Status: DISCONTINUED | OUTPATIENT
Start: 2017-02-14 | End: 2017-02-17

## 2017-02-14 RX ORDER — SIMVASTATIN 20 MG/1
20 TABLET, FILM COATED ORAL AT BEDTIME
Qty: 0 | Refills: 0 | Status: DISCONTINUED | OUTPATIENT
Start: 2017-02-14 | End: 2017-02-17

## 2017-02-14 RX ADMIN — MIDODRINE HYDROCHLORIDE 5 MILLIGRAM(S): 2.5 TABLET ORAL at 17:27

## 2017-02-14 RX ADMIN — Medication 1 TABLET(S): at 17:26

## 2017-02-14 RX ADMIN — Medication 1 TABLET(S): at 12:10

## 2017-02-14 RX ADMIN — OXCARBAZEPINE 600 MILLIGRAM(S): 300 TABLET, FILM COATED ORAL at 17:27

## 2017-02-14 RX ADMIN — Medication 1 TABLET(S): at 08:00

## 2017-02-14 RX ADMIN — HEPARIN SODIUM 5000 UNIT(S): 5000 INJECTION INTRAVENOUS; SUBCUTANEOUS at 17:27

## 2017-02-14 RX ADMIN — LEVETIRACETAM 750 MILLIGRAM(S): 250 TABLET, FILM COATED ORAL at 17:26

## 2017-02-14 RX ADMIN — RISPERIDONE 1 MILLIGRAM(S): 4 TABLET ORAL at 12:10

## 2017-02-14 RX ADMIN — Medication 1 TABLET(S): at 17:27

## 2017-02-14 RX ADMIN — LEVETIRACETAM 750 MILLIGRAM(S): 250 TABLET, FILM COATED ORAL at 06:13

## 2017-02-14 RX ADMIN — SODIUM CHLORIDE 3 MILLILITER(S): 9 INJECTION INTRAMUSCULAR; INTRAVENOUS; SUBCUTANEOUS at 11:22

## 2017-02-14 RX ADMIN — SIMVASTATIN 20 MILLIGRAM(S): 20 TABLET, FILM COATED ORAL at 22:09

## 2017-02-14 RX ADMIN — OXCARBAZEPINE 600 MILLIGRAM(S): 300 TABLET, FILM COATED ORAL at 06:13

## 2017-02-14 RX ADMIN — Medication 667 MILLIGRAM(S): at 08:00

## 2017-02-14 RX ADMIN — Medication 667 MILLIGRAM(S): at 17:26

## 2017-02-14 RX ADMIN — Medication 1 TABLET(S): at 22:09

## 2017-02-14 NOTE — PROGRESS NOTE ADULT - SUBJECTIVE AND OBJECTIVE BOX
Initial Consultaion Note  Requested by Name:  Date/ Time:  Reason for referral/ Consultation:    HPI:  this is a 61 years old male with history of mental retardation with end stage renal disease and Hypertension brought into the emergency room for blocked dialysis catheter and found to be febrile. patient nonverbal and no further info [id number] (07 Feb 2017 19:49), since pt has mental retardation , adv direc has to be coordinated w [facility]. We will have to discuss w family      PAST MEDICAL & SURGICAL HISTORY:  Sepsis associated with vascular access catheter, subsequent encounter  Hyperglycemia  Altered mental status  Dialysis patient  Diabetes  Lipids abnormal  Renal failure  Seizure  HTN (hypertension)  Mental retardation  PEG (percutaneous endoscopic gastrostomy) status  No significant past surgical history      SOCIAL HISTORY:                                 Admitted from: home [ ] SNF [ ] [facility] [ ] [facility] [ ]    Surrogate/HCP/Guardian: [ ] YES [ ] NO. Name/ Phone#:  Significant other/partner:                     Children:                         Mormon/Spirituality:    Baseline ADLs (Prior to admission)    ADVANCE DIRECTIVES:  [ ] YES [ ] NO   DNR [ ] YES [ ] NO  Completed on:                     MOLST  [ ] YES [ ] NO   Completed on:  Living Will  [ ] YES [ ] NO   Completed on:    Allergies    No Known Allergies    Intolerances        Review of Systems:     PAIN : (Y/N) (0-10)  PAIN SITE :  QUALITY/QUANTITY OF PAIN :  PAIN RADIATING :( Y/N)  SEVERITY :  FREQUENCY :  IMPACT ON ADLs :      DYSPNEA: (Y Mild [ ] Moderate [ ] Severe [ x]  NAUSEA/VOMITING: (Y/N)  DEPRESSION: (Y/N) Mild [ ]Moderate [ ] Severe [ ]  ANXIETY: (Y/N)  AGITATION: (Y/N):  SECRETIONS:(Y/N)  CONTIPATION : (Y/N)  DIARRHEA : (Y/N)  FRAILTY SYNDROM (Y/N):  FAILURE TO THRIVE (Y/N):  DIBILITY (Y/N);  OTHER SYMPTOMS :  UNABLE TO OBTAIN DUE TO POOR MENTATION (   )    PHYSICAL EXAM:  T(F): 99, Max: 100 (02-14 @ 07:25)  HR: 94 (84 - 110)  BP: 169/79 (124/71 - 184/92)  RR: 17 (16 - 24)  SpO2: 99% (95% - 100%)  Wt(kg): --   WEIGHT :    [id number]                  BMI:  I&O's Summary    I & Os for current day (as of 14 Feb 2017 16:13)  =============================================  IN: 180 ml / OUT: 1500 ml / NET: -1320 ml    CAPILLARY BLOOD GLUCOSE  103 (14 Feb 2017 12:06)  118 (14 Feb 2017 07:55)  157 (13 Feb 2017 22:47)      GENERAL: alert [ ] oriented x ______[ ] lethargic [ ] agitated [ ] cachexia [ ] nonverbal [x61 yo w mental retardation , making the ] coma [ ]  HEENT: normal [ ] dry mouth [ ] ET tube/trach [ ]   LUNGS: ]  CV :  normal [ ]  GI : normal [ ]  PEG /NG tube [ ]   : normal [ ] incontinent [ ] oliguria/anuria [ ] harrington [ ]  MSK : normal [ ] weakness [ ] edema [ ]              ambulatory [ ] bebbound/wheelchair bound [ ]   SKIN : normal [ ] pressure ulcer (Y/N) Stage ______________ rash (Y/N)    Functional Assessment:Karnofsky Performance Score :  Palliative Performance Status Version 2:         %    LABS:                        8.4    12.8  )-----------( 317      ( 13 Feb 2017 17:11 )             24.8     13 Feb 2017 17:11    139    |  99     |  44     ----------------------------<  117    3.1     |  28     |  4.47     Ca    8.4        13 Feb 2017 17:11  Phos  2.1       13 Feb 2017 17:11    TPro  6.7    /  Alb  1.8    /  TBili  0.3    /  DBili  x      /  AST  27     /  ALT  27     /  AlkPhos  87     13 Feb 2017 17:11    PT/INR - ( 13 Feb 2017 17:11 )   PT: 12.1 sec;   INR: 1.08 ratio         PTT - ( 13 Feb 2017 17:11 )  PTT:29.7 sec      I&O's Detail    I & Os for current day (as of 14 Feb 2017 16:13)  =============================================  IN:    Nepro: 180 ml    Total IN: 180 ml  ---------------------------------------------  OUT:    Other: 1500 ml    Total OUT: 1500 ml  ---------------------------------------------  Total NET: -1320 ml      Assessment:   61y Male admitted with SEPSIS AFFECTING SKIN/ ALTERED EMNTAL STATUS  HARRINGTON CHANGE      PROBLEM LIST :  PROBLEM/RECOMMENDATION: 1  Problem : Goals of care, counseling/ discussion.  Recommendation: met with patient/ family and discussed GOC & Advanced care planning                                    Palliative care info/counseling provided (Y/N)                                      Family meeting                                   Advanced Directives addressed (Y/N)  PROBLEM/ RECOMMENDATION:2  Problem :Resuscitation/ DNR/ DNI,   Recommendation: DNR/ DNI    PROBLEM/ RECOMMENDATION :3  Problem : Medical order for life sustaning treatment  Recommendation : MOLST Form ( initiated/ completed)    PROBLEM/RECOMMENDATION :4  Problem : Advanced care planning  Recommendation : Family meeting.(Y/N)     PLAN:  REFFERALS:                           Unit SW/Case Mgmt (Y/N)                          (Y/N)                         Speech/Swallow (Y/N)                           nutrition                                              Ethics (Y/N)                         PT/OT (Y/N)

## 2017-02-14 NOTE — PROGRESS NOTE ADULT - ASSESSMENT
62 yo w mentalretardation which makes the GOC and ADV DIR very difficukt , we haVE to follow [facility] guidelines   for the time cont all care .  Pt prognosis overall is poor, and further decline is expected , KPS<$)

## 2017-02-14 NOTE — PROGRESS NOTE ADULT - SUBJECTIVE AND OBJECTIVE BOX
Patient is a 61y old  Male who presents with a chief complaint of blocked dialysis catheter (07 Feb 2017 19:49)      INTERVAL HPI / OVERNIGHT EVENTS:awake,non verbal ,no new events    MEDICATIONS  (STANDING):  heparin  Injectable 5000Unit(s) SubCutaneous every 12 hours  OXcarbazepine 600milliGRAM(s) Oral two times a day  simvastatin 20milliGRAM(s) Oral at bedtime  risperiDONE   Tablet 1milliGRAM(s) Oral daily  calcium acetate 667milliGRAM(s) Oral three times a day with meals  Nephro-bryant 1Tablet(s) Oral daily  lactobacillus acidophilus 1Tablet(s) Oral daily  levETIRAcetam  Solution 750milliGRAM(s) Oral two times a day  midodrine 5milliGRAM(s) Oral every 12 hours  insulin lispro (HumaLOG) corrective regimen sliding scale  SubCutaneous three times a day before meals  dextrose 5%. 1000milliLiter(s) IV Continuous <Continuous>  dextrose 50% Injectable 12.5Gram(s) IV Push once  dextrose 50% Injectable 25Gram(s) IV Push once  dextrose 50% Injectable 25Gram(s) IV Push once  epoetin agnes Injectable 73871Dcps(s) IV Push <User Schedule>  potassium acid phosphate/sodium acid phosphate tablet (K-PHOS No. 2) 1Tablet(s) Oral four times a day with meals    MEDICATIONS  (PRN):  acetaminophen  Suppository 325milliGRAM(s) Rectal every 6 hours PRN For Temp greater than 38 C (100.4 F)  dextrose Gel 1Dose(s) Oral once PRN Blood Glucose LESS THAN 70 milliGRAM(s)/deciliter  glucagon  Injectable 1milliGRAM(s) IntraMuscular once PRN Glucose LESS THAN 70 milligrams/deciliter      Vital Signs Last 24 Hrs  Tmax -afebrile    PHYSICAL EXAM:    Constitutional: NAD,non verbal  Respiratory: CTAB/L  Cardiovascular: S1 and S2, RRR, no M/G/R  Gastrointestinal: BS+, soft, NT/ND  Extremities: No peripheral edema  Vascular: 2+ peripheral pulses  Skin: sacral decub-clean base  Lines left chest permacath    LABS:        MICROBIOLOGY:  RECENT CULTURES:  02-08 .Urine Catheterized XXXX XXXX   No growth    02-08 .Abscess sacral decub wound Methicillin resistant Staphylococcus aureus XXXX   Rare Methicillin resistant Staphylococcus aureus  Few Presumptive Candida albicans  Rare Enterococcus species    02-08 .Blood Blood-Peripheral Coag Negative Staphylococcus   Growth in aerobic bottle:  Gram Positive Cocci in Clusters   Growth in aerobic bottle:  Coag Negative Staphylococcus          RADIOLOGY & ADDITIONAL STUDIES:

## 2017-02-14 NOTE — PROGRESS NOTE ADULT - SUBJECTIVE AND OBJECTIVE BOX
Patient is a 61y old  Male who presents with a chief complaint of blocked dialysis catheter (07 Feb 2017 19:49)      INTERVAL HPI/OVERNIGHT EVENTS:  pt went to o.r. in a.m. for dialysis catheter exchange    MEDICATIONS  (STANDING):  heparin  Injectable 5000Unit(s) SubCutaneous every 12 hours  OXcarbazepine 600milliGRAM(s) Oral two times a day  simvastatin 20milliGRAM(s) Oral at bedtime  risperiDONE   Tablet 1milliGRAM(s) Oral daily  calcium acetate 667milliGRAM(s) Oral three times a day with meals  Nephro-bryant 1Tablet(s) Oral daily  lactobacillus acidophilus 1Tablet(s) Oral daily  levETIRAcetam  Solution 750milliGRAM(s) Oral two times a day  midodrine 5milliGRAM(s) Oral every 12 hours  insulin lispro (HumaLOG) corrective regimen sliding scale  SubCutaneous three times a day before meals  dextrose 5%. 1000milliLiter(s) IV Continuous <Continuous>  dextrose 50% Injectable 12.5Gram(s) IV Push once  dextrose 50% Injectable 25Gram(s) IV Push once  dextrose 50% Injectable 25Gram(s) IV Push once  epoetin agnes Injectable 18044Mvxh(s) IV Push <User Schedule>  vancomycin  IVPB 1000milliGRAM(s) IV Intermittent once  potassium acid phosphate/sodium acid phosphate tablet (K-PHOS No. 2) 1Tablet(s) Oral four times a day with meals    MEDICATIONS  (PRN):  acetaminophen  Suppository 325milliGRAM(s) Rectal every 6 hours PRN For Temp greater than 38 C (100.4 F)  dextrose Gel 1Dose(s) Oral once PRN Blood Glucose LESS THAN 70 milliGRAM(s)/deciliter  glucagon  Injectable 1milliGRAM(s) IntraMuscular once PRN Glucose LESS THAN 70 milligrams/deciliter        REVIEW OF SYSTEMS:  CONSTITUTIONAL: No fever, weight loss, or fatigue  EYES: No eye pain, visual disturbances, or discharge  ENMT:  No difficulty hearing, tinnitus, vertigo; No sinus or throat pain  NECK: No pain or stiffness  RESPIRATORY: No cough, wheezing, chills or hemoptysis; No shortness of breath  CARDIOVASCULAR: No chest pain, palpitations, dizziness, or leg swelling  GASTROINTESTINAL: No abdominal or epigastric pain. No nausea, vomiting, or hematemesis; No diarrhea or constipation. No melena or hematochezia.  GENITOURINARY: No dysuria, frequency, hematuria, or incontinence  NEUROLOGICAL: No headaches, memory loss, loss of strength, numbness, or tremors  SKIN: No itching, burning, rashes, or lesions      Vital Signs Last 24 Hrs  T(C): 37.6, Max: 37.6 (02-13 @ 21:00)  T(F): 99.6, Max: 99.6 (02-13 @ 21:00)  HR: 110 (75 - 110)  BP: 163/79 (124/71 - 184/92)  BP(mean): --  RR: 17 (16 - 17)  SpO2: 98% (98% - 100%)    PHYSICAL EXAM:  GENERAL: NAD, well-groomed, well-developed  HEAD:  Atraumatic, Normocephalic  EYES: EOMI, PERRLA, conjunctiva and sclera clear  ENMT: No tonsillar erythema, exudates, or enlargement; Moist mucous membranes   NECK: Supple, No JVD   NERVOUS SYSTEM:  Alert & Oriented X3, Good concentration; Motor Strength 5/5 B/L upper and lower extremities; DTRs 2+ intact and symmetric  CHEST/LUNG: Clear to percussion bilaterally; No rales, rhonchi, wheezing, or rubs  HEART: Regular rate and rhythm; No murmurs, rubs, or gallops  ABDOMEN: Soft, Nontender, Nondistended; Bowel sounds present  EXTREMITIES:  2+ Peripheral Pulses, No clubbing, cyanosis, or edema  LYMPH: No lymphadenopathy noted  SKIN: No rashes or lesions    LABS:                        8.4    12.8  )-----------( 317      ( 13 Feb 2017 17:11 )             24.8     13 Feb 2017 17:11    139    |  99     |  44     ----------------------------<  117    3.1     |  28     |  4.47     Ca    8.4        13 Feb 2017 17:11  Phos  2.1       13 Feb 2017 17:11    TPro  6.7    /  Alb  1.8    /  TBili  0.3    /  DBili  x      /  AST  27     /  ALT  27     /  AlkPhos  87     13 Feb 2017 17:11    PT/INR - ( 13 Feb 2017 17:11 )   PT: 12.1 sec;   INR: 1.08 ratio         PTT - ( 13 Feb 2017 17:11 )  PTT:29.7 sec    CAPILLARY BLOOD GLUCOSE  100 (13 Feb 2017 15:46)  90 (13 Feb 2017 12:36)  87 (13 Feb 2017 08:33)      RADIOLOGY & ADDITIONAL TESTS:    Imaging Personally Reviewed:  [ ] YES  [ ] NO    Consultant(s) Notes Reviewed:  [ ] YES  [ ] NO    Care Discussed with Consultants/Other Providers [ ] YES  [ ] NO

## 2017-02-14 NOTE — PROGRESS NOTE ADULT - PROBLEM SELECTOR PLAN 1
likely contaminant but as pt has cath awaiting for repeat BC (ordered multple times on last few days but hasn't been drawn as of now)  cont Vanco post HD for now  Noted to have leukocytosis yesterday   f/u on repeat  BC -still not drawn ,spoke with nurse

## 2017-02-15 DIAGNOSIS — J90 PLEURAL EFFUSION, NOT ELSEWHERE CLASSIFIED: ICD-10-CM

## 2017-02-15 LAB
FERRITIN SERPL-MCNC: 1858 NG/ML — HIGH (ref 30–400)
HCT VFR BLD CALC: 22.6 % — LOW (ref 39–50)
HGB BLD-MCNC: 7.5 G/DL — LOW (ref 13–17)
MCHC RBC-ENTMCNC: 30.2 PG — SIGNIFICANT CHANGE UP (ref 27–34)
MCHC RBC-ENTMCNC: 33.3 GM/DL — SIGNIFICANT CHANGE UP (ref 32–36)
MCV RBC AUTO: 90.8 FL — SIGNIFICANT CHANGE UP (ref 80–100)
PLATELET # BLD AUTO: 318 K/UL — SIGNIFICANT CHANGE UP (ref 150–400)
RBC # BLD: 2.49 M/UL — LOW (ref 4.2–5.8)
RBC # FLD: 15.6 % — HIGH (ref 11–15)
SURGICAL PATHOLOGY FINAL REPORT - CH: SIGNIFICANT CHANGE UP
WBC # BLD: 9.8 K/UL — SIGNIFICANT CHANGE UP (ref 3.8–10.5)
WBC # FLD AUTO: 9.8 K/UL — SIGNIFICANT CHANGE UP (ref 3.8–10.5)

## 2017-02-15 PROCEDURE — 99233 SBSQ HOSP IP/OBS HIGH 50: CPT

## 2017-02-15 RX ORDER — PETROLATUM,WHITE
1 JELLY (GRAM) TOPICAL
Qty: 0 | Refills: 0 | Status: DISCONTINUED | OUTPATIENT
Start: 2017-02-15 | End: 2017-02-17

## 2017-02-15 RX ORDER — IPRATROPIUM/ALBUTEROL SULFATE 18-103MCG
3 AEROSOL WITH ADAPTER (GRAM) INHALATION EVERY 6 HOURS
Qty: 0 | Refills: 0 | Status: DISCONTINUED | OUTPATIENT
Start: 2017-02-15 | End: 2017-02-17

## 2017-02-15 RX ADMIN — Medication 1 APPLICATION(S): at 22:59

## 2017-02-15 RX ADMIN — OXCARBAZEPINE 600 MILLIGRAM(S): 300 TABLET, FILM COATED ORAL at 05:33

## 2017-02-15 RX ADMIN — LEVETIRACETAM 750 MILLIGRAM(S): 250 TABLET, FILM COATED ORAL at 16:47

## 2017-02-15 RX ADMIN — Medication 667 MILLIGRAM(S): at 16:47

## 2017-02-15 RX ADMIN — RISPERIDONE 1 MILLIGRAM(S): 4 TABLET ORAL at 16:48

## 2017-02-15 RX ADMIN — HEPARIN SODIUM 5000 UNIT(S): 5000 INJECTION INTRAVENOUS; SUBCUTANEOUS at 05:31

## 2017-02-15 RX ADMIN — ERYTHROPOIETIN 10000 UNIT(S): 10000 INJECTION, SOLUTION INTRAVENOUS; SUBCUTANEOUS at 13:50

## 2017-02-15 RX ADMIN — Medication 1 TABLET(S): at 15:08

## 2017-02-15 RX ADMIN — SODIUM CHLORIDE 3 MILLILITER(S): 9 INJECTION INTRAMUSCULAR; INTRAVENOUS; SUBCUTANEOUS at 15:08

## 2017-02-15 RX ADMIN — SODIUM CHLORIDE 3 MILLILITER(S): 9 INJECTION INTRAMUSCULAR; INTRAVENOUS; SUBCUTANEOUS at 05:37

## 2017-02-15 RX ADMIN — SIMVASTATIN 20 MILLIGRAM(S): 20 TABLET, FILM COATED ORAL at 23:00

## 2017-02-15 RX ADMIN — HEPARIN SODIUM 5000 UNIT(S): 5000 INJECTION INTRAVENOUS; SUBCUTANEOUS at 23:00

## 2017-02-15 RX ADMIN — SODIUM CHLORIDE 3 MILLILITER(S): 9 INJECTION INTRAMUSCULAR; INTRAVENOUS; SUBCUTANEOUS at 00:00

## 2017-02-15 RX ADMIN — OXCARBAZEPINE 600 MILLIGRAM(S): 300 TABLET, FILM COATED ORAL at 16:48

## 2017-02-15 RX ADMIN — SODIUM CHLORIDE 3 MILLILITER(S): 9 INJECTION INTRAMUSCULAR; INTRAVENOUS; SUBCUTANEOUS at 23:05

## 2017-02-15 RX ADMIN — Medication 3 MILLILITER(S): at 18:17

## 2017-02-15 RX ADMIN — LEVETIRACETAM 750 MILLIGRAM(S): 250 TABLET, FILM COATED ORAL at 05:33

## 2017-02-15 NOTE — PROGRESS NOTE ADULT - ASSESSMENT
Young man w Premature aging and multiple orgon failure  w sepsis   on HD   overall prognosis poor   Goc for such pt is difficult , will cont to follow

## 2017-02-15 NOTE — PROGRESS NOTE ADULT - SUBJECTIVE AND OBJECTIVE BOX
Patient for HD today. No new events.  Tolerating HD treatments well.  HD prescription reviewed.        PHYSICAL EXAM:      T(C): 37.4, Max: 37.7 (02-15 @ 06:00)  HR: 93 (87 - 102)  BP: 150/77 (130/67 - 169/79)  RR: 17 (16 - 18)  SpO2: 94% (94% - 100%)  Wt(kg): --  Respiratory: clear anteriorly, decreased BS at bases  Cardiovascular: S1 S2  Gastrointestinal: soft NT ND +BS  Extremities:  tr  edema                                          7.5    9.8   )-----------( 318      ( 15 Feb 2017 11:01 )             22.6     14 Feb 2017 20:44    139    |  103    |  28     ----------------------------<  146    3.9     |  25     |  3.09     Ca    7.8        14 Feb 2017 20:44  Phos  2.9       14 Feb 2017 20:44  Mg     2.5       14 Feb 2017 20:44    TPro  6.7    /  Alb  1.8    /  TBili  0.3    /  DBili  x      /  AST  27     /  ALT  27     /  AlkPhos  87     13 Feb 2017 17:11      Maintenance hemodialysis.  Catheter working well.  Blood pressure trends, BFR, AP, , UF goal reviewed.  Clinically stable.

## 2017-02-15 NOTE — PROGRESS NOTE ADULT - SUBJECTIVE AND OBJECTIVE BOX
Patient is a 61y old  Male who presents with a chief complaint of blocked dialysis catheter (07 Feb 2017 19:49)      INTERVAL HPI/OVERNIGHT EVENTS:  s/p left chest wall perma-cath exchange  seen in dialysis  more alert today  +audible expiratory rhonchi  leukocytosis resolved  chest x-ray 2/14/2017 w/ bilateral pleural effusions    MEDICATIONS  (STANDING):  heparin  Injectable 5000Unit(s) SubCutaneous every 12 hours  OXcarbazepine 600milliGRAM(s) Oral two times a day  simvastatin 20milliGRAM(s) Oral at bedtime  risperiDONE   Tablet 1milliGRAM(s) Oral daily  calcium acetate 667milliGRAM(s) Oral three times a day with meals  Nephro-bryant 1Tablet(s) Oral daily  lactobacillus acidophilus 1Tablet(s) Oral daily  levETIRAcetam  Solution 750milliGRAM(s) Oral two times a day  midodrine 5milliGRAM(s) Oral every 12 hours  insulin lispro (HumaLOG) corrective regimen sliding scale  SubCutaneous three times a day before meals  dextrose 5%. 1000milliLiter(s) IV Continuous <Continuous>  dextrose 50% Injectable 12.5Gram(s) IV Push once  dextrose 50% Injectable 25Gram(s) IV Push once  dextrose 50% Injectable 25Gram(s) IV Push once  epoetin agnes Injectable 12788Pxba(s) IV Push <User Schedule>  vancomycin  IVPB 1000milliGRAM(s) IV Intermittent once  potassium acid phosphate/sodium acid phosphate tablet (K-PHOS No. 2) 1Tablet(s) Oral four times a day with meals    MEDICATIONS  (PRN):  acetaminophen  Suppository 325milliGRAM(s) Rectal every 6 hours PRN For Temp greater than 38 C (100.4 F)  dextrose Gel 1Dose(s) Oral once PRN Blood Glucose LESS THAN 70 milliGRAM(s)/deciliter  glucagon  Injectable 1milliGRAM(s) IntraMuscular once PRN Glucose LESS THAN 70 milligrams/deciliter        REVIEW OF SYSTEMS:  CONSTITUTIONAL: No fever, weight loss, or fatigue  EYES: No eye pain, visual disturbances, or discharge  ENMT:  No difficulty hearing, tinnitus, vertigo; No sinus or throat pain  NECK: No pain or stiffness  RESPIRATORY: No cough, wheezing, chills or hemoptysis; No shortness of breath  CARDIOVASCULAR: No chest pain, palpitations, dizziness, or leg swelling  GASTROINTESTINAL: No abdominal or epigastric pain. No nausea, vomiting, or hematemesis; No diarrhea or constipation. No melena or hematochezia.  GENITOURINARY: No dysuria, frequency, hematuria, or incontinence  NEUROLOGICAL: No headaches, memory loss, loss of strength, numbness, or tremors  SKIN: No itching, burning, rashes, or lesions      Vital Signs Last 24 Hrs  T(C): 37.6, Max: 37.6 (02-13 @ 21:00)  T(F): 99.6, Max: 99.6 (02-13 @ 21:00)  HR: 110 (75 - 110)  BP: 163/79 (124/71 - 184/92)  BP(mean): --  RR: 17 (16 - 17)  SpO2: 98% (98% - 100%)    PHYSICAL EXAM:  GENERAL: NAD, well-groomed, well-developed  HEAD:  Atraumatic, Normocephalic  EYES: EOMI, PERRLA, conjunctiva and sclera clear  ENMT: No tonsillar erythema, exudates, or enlargement; Moist mucous membranes   NECK: Supple, No JVD   NERVOUS SYSTEM:  Alert & Oriented X3, Good concentration; Motor Strength 5/5 B/L upper and lower extremities; DTRs 2+ intact and symmetric  CHEST/LUNG: Rhonchi  HEART: Regular rate and rhythm; No murmurs, rubs, or gallops  ABDOMEN: Soft, Nontender, Nondistended; Bowel sounds present  EXTREMITIES:  2+ Peripheral Pulses, No clubbing, cyanosis, or edema  LYMPH: No lymphadenopathy noted  SKIN: No rashes or lesions    LABS:                        8.4    12.8  )-----------( 317      ( 13 Feb 2017 17:11 )             24.8     13 Feb 2017 17:11    139    |  99     |  44     ----------------------------<  117    3.1     |  28     |  4.47     Ca    8.4        13 Feb 2017 17:11  Phos  2.1       13 Feb 2017 17:11    TPro  6.7    /  Alb  1.8    /  TBili  0.3    /  DBili  x      /  AST  27     /  ALT  27     /  AlkPhos  87     13 Feb 2017 17:11    PT/INR - ( 13 Feb 2017 17:11 )   PT: 12.1 sec;   INR: 1.08 ratio         PTT - ( 13 Feb 2017 17:11 )  PTT:29.7 sec    CAPILLARY BLOOD GLUCOSE  100 (13 Feb 2017 15:46)  90 (13 Feb 2017 12:36)  87 (13 Feb 2017 08:33)      RADIOLOGY & ADDITIONAL TESTS:    Imaging Personally Reviewed:  [ ] YES  [ ] NO    Consultant(s) Notes Reviewed:  [ ] YES  [ ] NO    Care Discussed with Consultants/Other Providers [ ] YES  [ ] NO

## 2017-02-15 NOTE — PROGRESS NOTE ADULT - PROBLEM SELECTOR PLAN 1
likely contaminant but as pt has cath awaiting for repeat BC (ordered multiple times on last few days but hasn't been drawn as of now)  cont Vanco post HD for now  leukocytosis improving

## 2017-02-15 NOTE — PROGRESS NOTE ADULT - SUBJECTIVE AND OBJECTIVE BOX
Initial Consultaion Note  Requested by Name:  Date/ Time:  Reason for referral/ Consultation:    HPI:  this is a 61 years old male with history of mental retardation with end stage renal disease and Hypertension brought into the emergency room for blocked dialysis catheter and found to be febrile. patient nonverbal and no further info [id number] (07 Feb 2017 19:49)      PAST MEDICAL & SURGICAL HISTORY:  Sepsis associated with vascular access catheter, subsequent encounter  Hyperglycemia  Altered mental status  Dialysis patient  Diabetes  Lipids abnormal  Renal failure  Seizure  HTN (hypertension)  Mental retardation  PEG (percutaneous endoscopic gastrostomy) status  No significant past surgical history      SOCIAL HISTORY:                                 Admitted from: home [ ] SNF [ ] [facility] [ ] [facility] [ ]    Surrogate/HCP/Guardian: [ ] YES [ ] NO. Name/ Phone#:  Significant other/partner:                     Children:                         Anabaptism/Spirituality:    Baseline ADLs (Prior to admission)    ADVANCE DIRECTIVES:  [ ] YES [ ] NO   DNR [ ] YES [ ] NO  Completed on:                     MOLST  [ ] YES [ ] NO   Completed on:  Living Will  [ ] YES [ ] NO   Completed on:    Allergies    No Known Allergies    Intolerances        Review of Systems:     PAIN : (Y/N) (0-10)  PAIN SITE :  QUALITY/QUANTITY OF PAIN :  PAIN RADIATING :( Y/N)  SEVERITY :  FREQUENCY :  IMPACT ON ADLs :      DYSPNEA: (Y/N) Mild [ ] Moderate [ ] Severe [ ]  NAUSEA/VOMITING: (Y/N)  DEPRESSION: (Y/N) Mild [ ]Moderate [ ] Severe [ ]  ANXIETY: (Y/N)  AGITATION: (Y/N):  SECRETIONS:(Y/N)  CONTIPATION : (Y/N)  DIARRHEA : (Y/N)  FRAILTY SYNDROM (Y/N):  FAILURE TO THRIVE (Y/N):  DIBILITY (Y/N);  OTHER SYMPTOMS :  UNABLE TO OBTAIN DUE TO POOR MENTATION (   )    PHYSICAL EXAM:  T(F): 98.8, Max: 99.8 (02-15 @ 06:00)  HR: 95 (87 - 102)  BP: 141/75 (133/70 - 169/74)  RR: 18 (16 - 18)  SpO2: 95% (94% - 100%)  Wt(kg): --   WEIGHT :    [id number]                  BMI:  I&O's Summary    I & Os for current day (as of 15 Feb 2017 17:28)  =============================================  IN: 405 ml / OUT: 0 ml / NET: 405 ml    CAPILLARY BLOOD GLUCOSE  139 (15 Feb 2017 15:17)  146 (15 Feb 2017 09:03)  116 (14 Feb 2017 22:26)      GENERAL: alert [ ] oriented x ______[ ] lethargic [ ] agitated [ ] cachexia [ ] nonverbal [ ] coma [ ]  HEENT: normal [ ] dry mouth [ ] ET tube/trach [ ]   LUNGS: ]  CV :  normal [ ]  GI : normal [ ]  PEG /NG tube [ ]   : normal [ ] incontinent [ ] oliguria/anuria [ ] harrington [ ]  MSK : normal [ ] weakness [ ] edema [ ]              ambulatory [ ] bebbound/wheelchair bound [ ]   SKIN : normal [ ] pressure ulcer (Y/N) Stage ______________ rash (Y/N)    Functional Assessment:Karnofsky Performance Score :  Palliative Performance Status Version 2:         %    LABS:                        7.5    9.8   )-----------( 318      ( 15 Feb 2017 11:01 )             22.6     14 Feb 2017 20:44    139    |  103    |  28     ----------------------------<  146    3.9     |  25     |  3.09     Ca    7.8        14 Feb 2017 20:44  Phos  2.9       14 Feb 2017 20:44  Mg     2.5       14 Feb 2017 20:44            I&O's Detail    I & Os for current day (as of 15 Feb 2017 17:28)  =============================================  IN:    Nepro: 405 ml    Total IN: 405 ml  ---------------------------------------------  OUT:    Total OUT: 0 ml  ---------------------------------------------  Total NET: 405 ml      Assessment:   61y Male admitted with SEPSIS AFFECTING SKIN/ ALTERED EMNTAL STATUS  HARRINGTON CHANGE      PROBLEM LIST :  PROBLEM/RECOMMENDATION: 1  Problem : Goals of care, counseling/ discussion.  Recommendation: met with patient/ family and discussed GOC & Advanced care planning                                    Palliative care info/counseling provided (Y/N)                                      Family meeting                                   Advanced Directives addressed (Y/N)  PROBLEM/ RECOMMENDATION:2  Problem :Resuscitation/ DNR/ DNI,   Recommendation: DNR/ DNI    PROBLEM/ RECOMMENDATION :3  Problem : Medical order for life sustaning treatment  Recommendation : MOLST Form ( initiated/ completed)    PROBLEM/RECOMMENDATION :4  Problem : Advanced care planning  Recommendation : Family meeting.(Y/N)     PLAN:  REFFERALS:                           Unit SW/Case Mgmt (Y/N)                          (Y/N)                         Speech/Swallow (Y/N)                           nutrition                                              Ethics (Y/N)                         PT/OT (Y/N)

## 2017-02-15 NOTE — PROGRESS NOTE ADULT - SUBJECTIVE AND OBJECTIVE BOX
Patient is a 61y old  Male who presents with a chief complaint of blocked dialysis catheter (07 Feb 2017 19:49)      INTERVAL HPI / OVERNIGHT EVENTS:awake,non verbal ,no new events    MEDICATIONS  (STANDING):  heparin  Injectable 5000Unit(s) SubCutaneous every 12 hours  OXcarbazepine 600milliGRAM(s) Oral two times a day  simvastatin 20milliGRAM(s) Oral at bedtime  risperiDONE   Tablet 1milliGRAM(s) Oral daily  calcium acetate 667milliGRAM(s) Oral three times a day with meals  Nephro-bryant 1Tablet(s) Oral daily  lactobacillus acidophilus 1Tablet(s) Oral daily  levETIRAcetam  Solution 750milliGRAM(s) Oral two times a day  midodrine 5milliGRAM(s) Oral every 12 hours  insulin lispro (HumaLOG) corrective regimen sliding scale  SubCutaneous three times a day before meals  dextrose 5%. 1000milliLiter(s) IV Continuous <Continuous>  dextrose 50% Injectable 12.5Gram(s) IV Push once  dextrose 50% Injectable 25Gram(s) IV Push once  dextrose 50% Injectable 25Gram(s) IV Push once  epoetin agnes Injectable 45616Vfan(s) IV Push <User Schedule>  potassium acid phosphate/sodium acid phosphate tablet (K-PHOS No. 2) 1Tablet(s) Oral four times a day with meals    MEDICATIONS  (PRN):  acetaminophen  Suppository 325milliGRAM(s) Rectal every 6 hours PRN For Temp greater than 38 C (100.4 F)  dextrose Gel 1Dose(s) Oral once PRN Blood Glucose LESS THAN 70 milliGRAM(s)/deciliter  glucagon  Injectable 1milliGRAM(s) IntraMuscular once PRN Glucose LESS THAN 70 milligrams/deciliter      Vital Signs Last 24 Hrs  Tmax -afebrile    PHYSICAL EXAM:    Constitutional: NAD,non verbal  Respiratory: CTAB/L  Cardiovascular: S1 and S2, RRR, no M/G/R  Gastrointestinal: BS+, soft, NT/ND  Extremities: No peripheral edema  Vascular: 2+ peripheral pulses  Skin: sacral decub-clean base  Lines left chest permacath    LABS:    WBC 9.8      MICROBIOLOGY:  RECENT CULTURES:  02-08 .Urine Catheterized XXXX XXXX   No growth    02-08 .Abscess sacral decub wound Methicillin resistant Staphylococcus aureus XXXX   Rare Methicillin resistant Staphylococcus aureus  Few Presumptive Candida albicans  Rare Enterococcus species    02-08 .Blood Blood-Peripheral Coag Negative Staphylococcus   Growth in aerobic bottle:  Gram Positive Cocci in Clusters   Growth in aerobic bottle:  Coag Negative Staphylococcus          RADIOLOGY & ADDITIONAL STUDIES:

## 2017-02-16 DIAGNOSIS — N18.9 CHRONIC KIDNEY DISEASE, UNSPECIFIED: ICD-10-CM

## 2017-02-16 LAB — BLD GP AB SCN SERPL QL: SIGNIFICANT CHANGE UP

## 2017-02-16 PROCEDURE — 99233 SBSQ HOSP IP/OBS HIGH 50: CPT

## 2017-02-16 RX ORDER — VANCOMYCIN HCL 1 G
1000 VIAL (EA) INTRAVENOUS ONCE
Qty: 0 | Refills: 0 | Status: COMPLETED | OUTPATIENT
Start: 2017-02-16 | End: 2017-02-16

## 2017-02-16 RX ADMIN — Medication 250 MILLIGRAM(S): at 17:13

## 2017-02-16 RX ADMIN — LEVETIRACETAM 750 MILLIGRAM(S): 250 TABLET, FILM COATED ORAL at 17:14

## 2017-02-16 RX ADMIN — Medication 3 MILLILITER(S): at 05:20

## 2017-02-16 RX ADMIN — Medication 667 MILLIGRAM(S): at 09:23

## 2017-02-16 RX ADMIN — Medication 667 MILLIGRAM(S): at 13:56

## 2017-02-16 RX ADMIN — OXCARBAZEPINE 600 MILLIGRAM(S): 300 TABLET, FILM COATED ORAL at 06:07

## 2017-02-16 RX ADMIN — Medication 3 MILLILITER(S): at 23:38

## 2017-02-16 RX ADMIN — Medication 3 MILLILITER(S): at 00:24

## 2017-02-16 RX ADMIN — SODIUM CHLORIDE 3 MILLILITER(S): 9 INJECTION INTRAMUSCULAR; INTRAVENOUS; SUBCUTANEOUS at 22:36

## 2017-02-16 RX ADMIN — Medication 3 MILLILITER(S): at 11:22

## 2017-02-16 RX ADMIN — Medication 1 TABLET(S): at 13:57

## 2017-02-16 RX ADMIN — RISPERIDONE 1 MILLIGRAM(S): 4 TABLET ORAL at 17:14

## 2017-02-16 RX ADMIN — HEPARIN SODIUM 5000 UNIT(S): 5000 INJECTION INTRAVENOUS; SUBCUTANEOUS at 06:07

## 2017-02-16 RX ADMIN — HEPARIN SODIUM 5000 UNIT(S): 5000 INJECTION INTRAVENOUS; SUBCUTANEOUS at 17:28

## 2017-02-16 RX ADMIN — LEVETIRACETAM 750 MILLIGRAM(S): 250 TABLET, FILM COATED ORAL at 06:07

## 2017-02-16 RX ADMIN — SODIUM CHLORIDE 3 MILLILITER(S): 9 INJECTION INTRAMUSCULAR; INTRAVENOUS; SUBCUTANEOUS at 06:14

## 2017-02-16 RX ADMIN — Medication 1 TABLET(S): at 13:56

## 2017-02-16 RX ADMIN — SODIUM CHLORIDE 3 MILLILITER(S): 9 INJECTION INTRAMUSCULAR; INTRAVENOUS; SUBCUTANEOUS at 13:57

## 2017-02-16 RX ADMIN — Medication 667 MILLIGRAM(S): at 17:28

## 2017-02-16 RX ADMIN — OXCARBAZEPINE 600 MILLIGRAM(S): 300 TABLET, FILM COATED ORAL at 17:27

## 2017-02-16 RX ADMIN — Medication 1 APPLICATION(S): at 06:14

## 2017-02-16 RX ADMIN — SIMVASTATIN 20 MILLIGRAM(S): 20 TABLET, FILM COATED ORAL at 22:36

## 2017-02-16 RX ADMIN — Medication 3 MILLILITER(S): at 17:32

## 2017-02-16 RX ADMIN — Medication 1 APPLICATION(S): at 17:14

## 2017-02-16 NOTE — PROGRESS NOTE ADULT - PROBLEM SELECTOR PROBLEM 4
Bacteriuria with pyuria
Altered mental status, unspecified altered mental status type
Hemodialysis catheter dysfunction, initial encounter
Altered mental status, unspecified altered mental status type
Altered mental status, unspecified altered mental status type
Bacteriuria with pyuria
Hemodialysis catheter dysfunction, initial encounter
Mental retardation
Altered mental status, unspecified altered mental status type
Altered mental status, unspecified altered mental status type

## 2017-02-16 NOTE — DISCHARGE NOTE ADULT - PLAN OF CARE
Continue Vancomycin 750 milligram(s) intravenous intra dialysis on Monday 2/20/2017 and Wednesday 2/22/2017 to complete two dosages   Follow up with PCP Resolution of infection Goal met s/p left perma-cath exchange Resolved Stable hemoglobin/hematocrit No nadiya blood loss  s/p 2 units pack red blood cells transfused on 2/17/2017 Continue hemodialysis as scheduled Routine hemodialysis Continue local wound care Wound healing Stable

## 2017-02-16 NOTE — PROGRESS NOTE ADULT - PROBLEM SELECTOR PLAN 9
-BMI 16.9  -Tube Feeding: Nepro via PEG @ goal rate of 45 ml/hr + Uraih x 2/day  -Evaluated by dietary
transfuse
-follow up Renal and vascular  -pt's dialysis schedule T/Th/Sat

## 2017-02-16 NOTE — PROGRESS NOTE ADULT - PROBLEM SELECTOR PROBLEM 7
Mental retardation
PEG (percutaneous endoscopic gastrostomy) status
Type 2 diabetes mellitus with hyperglycemia, without long-term current use of insulin
PEG (percutaneous endoscopic gastrostomy) status

## 2017-02-16 NOTE — PROGRESS NOTE ADULT - PROBLEM SELECTOR PLAN 4
-follow up Renal and vascular  -receiving alteplase
Resolving
-follow up Renal and vascular  -receiving alteplase
Resolving

## 2017-02-16 NOTE — DISCHARGE NOTE ADULT - PATIENT PORTAL LINK FT
“You can access the Follow[facility] Patient Portal, offered by [facility], by registering with the following website: [email or web address]”

## 2017-02-16 NOTE — PROGRESS NOTE ADULT - PROBLEM SELECTOR PLAN 7
NPO w/ PEG tube feeding
-monitor BG with RISS
NPO w/ PEG tube feeding
-supportive care  -risperiDONE   Tablet 1milliGRAM(s) Oral daily

## 2017-02-16 NOTE — DISCHARGE NOTE ADULT - MEDICATION SUMMARY - MEDICATIONS TO STOP TAKING
I will STOP taking the medications listed below when I get home from the hospital:    levoFLOXacin 250 mg oral tablet  -- 1 tab(s) by mouth every 48 hours x 6 days

## 2017-02-16 NOTE — DISCHARGE NOTE ADULT - MEDICATION SUMMARY - MEDICATIONS TO TAKE
I will START or STAY ON the medications listed below when I get home from the hospital:    levETIRAcetam 750 mg oral tablet  -- 1 tab(s) by mouth 2 times a day x 30 days  -- Indication: For Seizure    OXcarbazepine 600 mg oral tablet  -- 1 tab(s) by mouth 2 times a day x 30 days  -- Indication: For Seizure    simvastatin 20 mg oral tablet  -- 1 tab(s) by mouth once a day (at bedtime) x 30 days  -- Indication: For High cholesterol    risperiDONE 1 mg oral tablet  -- 1 tab(s) by mouth once a day x 30 days  -- Indication: For Mental retardation/Behavioral symptoms    amLODIPine 10 mg oral tablet  -- 1 tab(s) by mouth once a day x 30 days  -- Indication: For Hypertension    vancomycin 750 mg/150 mL-NaCl 0.9% intravenous solution  -- 750 milligram(s) intravenous intradialysis on Monday 2/20/2017 and Wednesday 2/22/2017 to complete two dosages   -- Indication: For Coagulase negative Staphylococcus bacteremia    senna 8.8 mg/5 mL oral syrup  -- 10 milliliter(s) by mouth once a day (at bedtime) x 30 days, As Needed -for constipation  -- Indication: For Constipation    midodrine 5 mg oral tablet  -- 1 tab(s) by mouth every 12 hours x 30 days  -- Indication: For Hypotension    calcium acetate 667 mg oral capsule  -- 1 cap(s) by mouth 2 times a day x 30 days  -- Indication: For ESRD (end stage renal disease) on dialysis    lactobacillus acidophilus oral capsule  -- 1 tab(s) by mouth 2 times a day x 30 days  -- Indication: For G.I. protection    Nephro-Prosper Vitamin B Complex with C and Folic Acid oral tablet  -- 1 tab(s) by mouth once a day x 30 days  -- Indication: For ESRD (end stage renal disease) on dialysis

## 2017-02-16 NOTE — PROGRESS NOTE ADULT - ASSESSMENT
Remains stable  staph coagulase neg contamination Vs real. Non infectious presentation  h/o Technically difficult catheter placement  catheter change over wire - done  Dc home in am after transfusion/hemodialysis

## 2017-02-16 NOTE — DISCHARGE NOTE ADULT - HOSPITAL COURSE
Chief Complaint/Reason for Admission	blocked dialysis catheter    History of Present Illness:  History of Present Illness	  this is a 61 years old male with history of mental retardation with end stage renal disease and Hypertension brought into the emergency room for blocked dialysis catheter and found to be febrile. patient nonverbal and no further info obtainable     Pt was seen by infectious disease consultation  Initial blood culture: + coag negative staphylococcus  Treated with IV antibiotics  Repeat blood cultures no growth to date  Received 2 pack red blood cells for low hemoglobin of 6.7 Chief Complaint/Reason for Admission	blocked dialysis catheter    History of Present Illness:  History of Present Illness	  this is a 61 years old male with history of mental retardation with end stage renal disease and Hypertension brought into the emergency room for blocked dialysis catheter and found to be febrile. patient nonverbal and no further info obtainable     Pt was seen by infectious disease consultation  Initial blood culture: + coag negative staphylococcus  Treated with IV antibiotics  Repeat blood cultures no growth to date  Received 2 pack red blood cells for low hemoglobin of 6.7  Pt stable for d/c to complete 2 doses of vancomycin intra-dialysis

## 2017-02-16 NOTE — DISCHARGE NOTE ADULT - SECONDARY DIAGNOSIS.
Hemodialysis catheter dysfunction, initial encounter Altered mental status Anemia due to chronic kidney disease ESRD (end stage renal disease) on dialysis Infected decubitus ulcer, stage IV Mental retardation

## 2017-02-16 NOTE — DISCHARGE NOTE ADULT - CARE PLAN
Principal Discharge DX:	Coagulase negative Staphylococcus bacteremia  Goal:	Resolution of infection  Instructions for follow-up, activity and diet:	Continue Vancomycin 750 milligram(s) intravenous intra dialysis on Monday 2/20/2017 and Wednesday 2/22/2017 to complete two dosages   Follow up with PCP  Secondary Diagnosis:	Hemodialysis catheter dysfunction, initial encounter  Goal:	Goal met  Instructions for follow-up, activity and diet:	s/p left perma-cath exchange  Secondary Diagnosis:	Altered mental status  Goal:	Goal met  Instructions for follow-up, activity and diet:	Resolved  Secondary Diagnosis:	Anemia due to chronic kidney disease  Goal:	Stable hemoglobin/hematocrit  Instructions for follow-up, activity and diet:	No nadiya blood loss  s/p 2 units pack red blood cells transfused on 2/17/2017  Secondary Diagnosis:	ESRD (end stage renal disease) on dialysis  Goal:	Routine hemodialysis  Instructions for follow-up, activity and diet:	Continue hemodialysis as scheduled  Secondary Diagnosis:	Infected decubitus ulcer, stage IV  Goal:	Wound healing  Instructions for follow-up, activity and diet:	Continue local wound care  Secondary Diagnosis:	Mental retardation  Goal:	Stable  Instructions for follow-up, activity and diet:	Stable

## 2017-02-16 NOTE — DISCHARGE NOTE ADULT - ADDITIONAL INSTRUCTIONS
Vancomycin 750 milligram(s) intravenous intra dialysis on Monday 2/20/2017 and Wednesday 2/22/2017 to complete two dosages

## 2017-02-16 NOTE — PROGRESS NOTE ADULT - PROBLEM SELECTOR PLAN 3
-wound care  -turn position Q2hrs
Continue local wound care
-wound care  -turn position Q2hrs
Continue local wound care
cleared for vascular procedure in am
no improvement in metabolic encephalopathy
no improvement in metabolic encephalopathy

## 2017-02-16 NOTE — PROGRESS NOTE ADULT - SUBJECTIVE AND OBJECTIVE BOX
Subjective: no change in status. Sister at bed side      MEDICATIONS  (STANDING):  sodium chloride 0.9% lock flush 3milliLiter(s) IV Push every 8 hours  heparin  Injectable 5000Unit(s) SubCutaneous every 12 hours  OXcarbazepine 600milliGRAM(s) Oral two times a day  simvastatin 20milliGRAM(s) Oral at bedtime  risperiDONE   Tablet 1milliGRAM(s) Oral daily  calcium acetate 667milliGRAM(s) Oral three times a day with meals  Nephro-bryant 1Tablet(s) Oral daily  lactobacillus acidophilus 1Tablet(s) Oral daily  midodrine 5milliGRAM(s) Oral every 12 hours  insulin lispro (HumaLOG) corrective regimen sliding scale  SubCutaneous three times a day before meals  dextrose 5%. 1000milliLiter(s) IV Continuous <Continuous>  dextrose 50% Injectable 12.5Gram(s) IV Push once  epoetin agnes Injectable 27851Kefw(s) IV Push <User Schedule>  levETIRAcetam  Solution 750milliGRAM(s) Enteral Tube two times a day  ALBUTerol/ipratropium for Nebulization 3milliLiter(s) Nebulizer every 6 hours  petrolatum white Ointment 1Application(s) Topical two times a day    MEDICATIONS  (PRN):  acetaminophen  Suppository 325milliGRAM(s) Rectal every 6 hours PRN For Temp greater than 38 C (100.4 F)  dextrose Gel 1Dose(s) Oral once PRN Blood Glucose LESS THAN 70 milliGRAM(s)/deciliter  glucagon  Injectable 1milliGRAM(s) IntraMuscular once PRN Glucose LESS THAN 70 milligrams/deciliter          T(C): 37.6, Max: 37.7 (02-15 @ 14:56)  HR: 88 (80 - 96)  BP: 162/91 (133/65 - 162/91)  RR: 17 (16 - 18)  SpO2: 96% (95% - 100%)  Wt(kg): --        I&O's Detail    I & Os for current day (as of 16 Feb 2017 10:48)  =============================================  IN:    Nepro: 540 ml    Total IN: 540 ml  ---------------------------------------------  OUT:    Other: 1500 ml    Total OUT: 1500 ml  ---------------------------------------------  Total NET: -960 ml           PHYSICAL EXAM:    GENERAL: no distress  EYES: EOMI, PERRLA, conjunctiva and sclera clear  NECK: Supple, no inc in JVP  CHEST/LUNG: Clear  HEART: S1S2  ABDOMEN: Soft, Nontender, Nondistended; Bowel sounds present  EXTREMITIES:  contracted  ACCESS: L chest PC      LABS:  CBC Full  -  ( 15 Feb 2017 11:01 )  WBC Count : 9.8 K/uL  Hemoglobin : 7.5 g/dL  Hematocrit : 22.6 %  Platelet Count - Automated : 318 K/uL  Mean Cell Volume : 90.8 fl  Mean Cell Hemoglobin : 30.2 pg  Mean Cell Hemoglobin Concentration : 33.3 gm/dL  Auto Neutrophil # : x  Auto Lymphocyte # : x  Auto Monocyte # : x  Auto Eosinophil # : x  Auto Basophil # : x  Auto Neutrophil % : x  Auto Lymphocyte % : x  Auto Monocyte % : x  Auto Eosinophil % : x  Auto Basophil % : x    14 Feb 2017 20:44    139    |  103    |  28     ----------------------------<  146    3.9     |  25     |  3.09     Ca    7.8        14 Feb 2017 20:44  Phos  2.9       14 Feb 2017 20:44  Mg     2.5       14 Feb 2017 20:44        ASSESSMENT and PLAN:  * Last HD yest. No dialytic indication today. Next HD 2/17 as Rxed.

## 2017-02-16 NOTE — PROGRESS NOTE ADULT - PROBLEM SELECTOR PLAN 2
-PT/wound care  -cont on IV abx as per ID
Continue IV antibiotics   Repeat blood cultures ordered
Continue IV antibiotics   Repeat blood cultures ordered  Pt is for alternate site perma-cath placement once repeat blood cultures neg
Continue IV antibiotics   Repeat blood cultures ordered  Pt is for alternate site perma-cath placement once repeat blood cultures neg
-PT/wound care  -cont on IV abx as per ID
Continue IV antibiotics   Repeat blood cultures ordered
Continue IV antibiotics   Repeat blood cultures ordered  Pt is for alternate site perma-cath placement once repeat blood cultures show no growth
c/s -mrsa, clean wound, likely colonized
wound care consult

## 2017-02-16 NOTE — PROGRESS NOTE ADULT - PROBLEM SELECTOR PROBLEM 3
Altered mental status, unspecified altered mental status type
Decubitus ulcer of sacral region, stage 4
Altered mental status, unspecified altered mental status type
Decubitus ulcer of sacral region, stage 4
ESRD (end stage renal disease) on dialysis
Decubitus ulcer of sacral region, stage 4
Decubitus ulcer of sacral region, stage 4

## 2017-02-16 NOTE — PROGRESS NOTE ADULT - PROBLEM SELECTOR PROBLEM 6
Essential hypertension

## 2017-02-16 NOTE — DISCHARGE NOTE ADULT - CARE PROVIDERS DIRECT ADDRESSES
,[email or web address],DirectAddress_Unknown,DirectAddress_Unknown,DirectAddress_Unknown,DirectAddress_Unknown

## 2017-02-16 NOTE — PROGRESS NOTE ADULT - PROBLEM SELECTOR PLAN 8
-follow up Renal and vascular  -pt's dialysis schedule T/Th/Sat
Diuresis/Hemodialysis
Diuresis/Hemodialysis
-monitor BG with RISS

## 2017-02-16 NOTE — PROGRESS NOTE ADULT - SUBJECTIVE AND OBJECTIVE BOX
Patient is a 61y old  Male who presents with a chief complaint of blocked dialysis catheter (16 Feb 2017 14:47)      INTERVAL HPI / OVERNIGHT EVENTS:    MEDICATIONS  (STANDING):  sodium chloride 0.9% lock flush 3milliLiter(s) IV Push every 8 hours  heparin  Injectable 5000Unit(s) SubCutaneous every 12 hours  OXcarbazepine 600milliGRAM(s) Oral two times a day  simvastatin 20milliGRAM(s) Oral at bedtime  risperiDONE   Tablet 1milliGRAM(s) Oral daily  calcium acetate 667milliGRAM(s) Oral three times a day with meals  Nephro-bryant 1Tablet(s) Oral daily  lactobacillus acidophilus 1Tablet(s) Oral daily  midodrine 5milliGRAM(s) Oral every 12 hours  insulin lispro (HumaLOG) corrective regimen sliding scale  SubCutaneous three times a day before meals  dextrose 5%. 1000milliLiter(s) IV Continuous <Continuous>  dextrose 50% Injectable 12.5Gram(s) IV Push once  epoetin agnes Injectable 39910Bjvh(s) IV Push <User Schedule>  levETIRAcetam  Solution 750milliGRAM(s) Enteral Tube two times a day  ALBUTerol/ipratropium for Nebulization 3milliLiter(s) Nebulizer every 6 hours  petrolatum white Ointment 1Application(s) Topical two times a day  vancomycin  IVPB 1000milliGRAM(s) IV Intermittent once    MEDICATIONS  (PRN):  acetaminophen  Suppository 325milliGRAM(s) Rectal every 6 hours PRN For Temp greater than 38 C (100.4 F)  dextrose Gel 1Dose(s) Oral once PRN Blood Glucose LESS THAN 70 milliGRAM(s)/deciliter  glucagon  Injectable 1milliGRAM(s) IntraMuscular once PRN Glucose LESS THAN 70 milligrams/deciliter      Vital Signs Last 24 Hrs  T(C): 37.7, Max: 37.7 (02-15 @ 23:34)  T(F): 99.8, Max: 99.8 (02-15 @ 23:34)  HR: 87 (80 - 96)  BP: 157/80 (133/65 - 162/91)  BP(mean): --  RR: 16 (16 - 18)  SpO2: 98% (95% - 100%)    PHYSICAL EXAM:NO new event    Constitutional: NAD, well-groomed, well-developed,non verbal  Respiratory: CTAB/L  Cardiovascular: S1 and S2, RRR, no M/G/R  Gastrointestinal: BS+, soft, NT/ND  Extremities: No peripheral edema  Vascular: 2+ peripheral pulses  Skin: No rashes  Lines-left scv HD cath    LABS:                        7.5    9.8   )-----------( 318      ( 15 Feb 2017 11:01 )             22.6     14 Feb 2017 20:44    139    |  103    |  28     ----------------------------<  146    3.9     |  25     |  3.09     Ca    7.8        14 Feb 2017 20:44  Phos  2.9       14 Feb 2017 20:44  Mg     2.5       14 Feb 2017 20:44              MICROBIOLOGY:  RECENT CULTURES:  Blood culture (2/15 ) =NG      RADIOLOGY & ADDITIONAL STUDIES:

## 2017-02-16 NOTE — PROGRESS NOTE ADULT - PROBLEM SELECTOR PROBLEM 5
Hemodialysis catheter dysfunction, initial encounter
ESRD (end stage renal disease) on dialysis
Hemodialysis catheter dysfunction, initial encounter
Seizure
Type 2 diabetes mellitus with hyperglycemia, without long-term current use of insulin
ESRD (end stage renal disease) on dialysis
Seizure
ESRD (end stage renal disease) on dialysis

## 2017-02-16 NOTE — PROGRESS NOTE ADULT - SUBJECTIVE AND OBJECTIVE BOX
Patient is a 61y old  Male who presents with a chief complaint of blocked dialysis catheter (07 Feb 2017 19:49)      INTERVAL HPI/OVERNIGHT EVENTS:  no interval problems  low hb noted  ID follow up - check cultures tomorrow then tentative dc    MEDICATIONS  (STANDING):  sodium chloride 0.9% lock flush 3milliLiter(s) IV Push every 8 hours  heparin  Injectable 5000Unit(s) SubCutaneous every 12 hours  OXcarbazepine 600milliGRAM(s) Oral two times a day  simvastatin 20milliGRAM(s) Oral at bedtime  risperiDONE   Tablet 1milliGRAM(s) Oral daily  calcium acetate 667milliGRAM(s) Oral three times a day with meals  Nephro-bryant 1Tablet(s) Oral daily  lactobacillus acidophilus 1Tablet(s) Oral daily  midodrine 5milliGRAM(s) Oral every 12 hours  insulin lispro (HumaLOG) corrective regimen sliding scale  SubCutaneous three times a day before meals  dextrose 5%. 1000milliLiter(s) IV Continuous <Continuous>  dextrose 50% Injectable 12.5Gram(s) IV Push once  epoetin agnes Injectable 51966Vnyq(s) IV Push <User Schedule>  levETIRAcetam  Solution 750milliGRAM(s) Enteral Tube two times a day  ALBUTerol/ipratropium for Nebulization 3milliLiter(s) Nebulizer every 6 hours  petrolatum white Ointment 1Application(s) Topical two times a day    MEDICATIONS  (PRN):  acetaminophen  Suppository 325milliGRAM(s) Rectal every 6 hours PRN For Temp greater than 38 C (100.4 F)  dextrose Gel 1Dose(s) Oral once PRN Blood Glucose LESS THAN 70 milliGRAM(s)/deciliter  glucagon  Injectable 1milliGRAM(s) IntraMuscular once PRN Glucose LESS THAN 70 milligrams/deciliter        REVIEW OF SYSTEMS:  CONSTITUTIONAL: No fever, weight loss, or fatigue  EYES: No eye pain, visual disturbances, or discharge  ENMT:  No difficulty hearing, tinnitus, vertigo; No sinus or throat pain  NECK: No pain or stiffness  RESPIRATORY: No cough, wheezing, chills or hemoptysis; No shortness of breath  CARDIOVASCULAR: No chest pain, palpitations, dizziness, or leg swelling  GASTROINTESTINAL: No abdominal or epigastric pain. No nausea, vomiting, or hematemesis; No diarrhea or constipation. No melena or hematochezia.  GENITOURINARY: No dysuria, frequency, hematuria, or incontinence  NEUROLOGICAL: No headaches, memory loss, loss of strength, numbness, or tremors  SKIN: No itching, burning, rashes, or lesions      Vital Signs Last 24 Hrs  T(C): 37.6, Max: 37.7 (02-15 @ 14:56)  T(F): 99.7, Max: 99.8 (02-15 @ 14:56)  HR: 85 (80 - 96)  BP: 162/91 (133/65 - 162/91)  BP(mean): --  RR: 17 (16 - 18)  SpO2: 97% (95% - 100%)    PHYSICAL EXAM:  GENERAL: NAD, well-groomed, well-developed  HEAD:  Atraumatic, Normocephalic  EYES: EOMI, PERRLA, conjunctiva and sclera clear  ENMT: No tonsillar erythema, exudates, or enlargement; Moist mucous membranes   NECK: Supple, No JVD   NERVOUS SYSTEM:  Alert & Oriented X3, Good concentration; Motor Strength 5/5 B/L upper and lower extremities; DTRs 2+ intact and symmetric  CHEST/LUNG: Clear to percussion bilaterally; No rales, rhonchi, wheezing, or rubs. left chest wall permacath  HEART: Regular rate and rhythm; No murmurs, rubs, or gallops  ABDOMEN: Soft, Nontender, Nondistended; Bowel sounds present  EXTREMITIES:  2+ Peripheral Pulses, No clubbing, cyanosis, or edema  LYMPH: No lymphadenopathy noted  SKIN: No rashes or lesions    LABS:                        7.5    9.8   )-----------( 318      ( 15 Feb 2017 11:01 )             22.6     14 Feb 2017 20:44    139    |  103    |  28     ----------------------------<  146    3.9     |  25     |  3.09     Ca    7.8        14 Feb 2017 20:44  Phos  2.9       14 Feb 2017 20:44  Mg     2.5       14 Feb 2017 20:44          CAPILLARY BLOOD GLUCOSE  119 (16 Feb 2017 07:55)  172 (16 Feb 2017 07:49)  131 (15 Feb 2017 22:06)  139 (15 Feb 2017 15:17)      RADIOLOGY & ADDITIONAL TESTS:    Imaging Personally Reviewed:  [ ] YES  [ ] NO    Consultant(s) Notes Reviewed:  [ ] YES  [ ] NO    Care Discussed with Consultants/Other Providers [ ] YES  [ ] NO

## 2017-02-16 NOTE — DISCHARGE NOTE ADULT - CARE PROVIDER_API CALL
Kyle Wharton (SCOTT), Internal Medicine  [address] [place] [address]  Phone: [phone]  Fax: [phone]    Princess Amaya (SCOTT), Infectious Disease; Internal Medicine  [address] [place] [address]  Phone: [phone]  Fax: [phone]    Julianna Harrington (NP; RN), NP in Adult Health  [address] [place] [address]  Phone: [phone]  Fax: [phone]    Chidi Franklin), Surgery  [address] [place] [address]  Phone: [phone]  Fax: [phone]

## 2017-02-16 NOTE — PROGRESS NOTE ADULT - PROBLEM SELECTOR PLAN 5
-levETIRAcetam  Solution 750milliGRAM(s) Oral two times a day  -seizure precaution
Continue hemodialysis as scheduled
issc and accucheck
issc and accucheck
-levETIRAcetam  Solution 750milliGRAM(s) Oral two times a day  -seizure precaution
Continue hemodialysis as scheduled

## 2017-02-16 NOTE — PROGRESS NOTE ADULT - PROBLEM SELECTOR PROBLEM 8
Type 2 diabetes mellitus with hyperglycemia, without long-term current use of insulin
ESRD (end stage renal disease) on dialysis
Pleural effusion, bilateral
Pleural effusion, bilateral

## 2017-02-16 NOTE — PROGRESS NOTE ADULT - PROBLEM SELECTOR PROBLEM 2
Decubitus ulcer of sacral region, stage 4
Infected decubitus ulcer, stage IV
Staphylococcus aureus bacteremia
Decubitus ulcer of sacral region, stage 4
Infected decubitus ulcer, stage IV
Staphylococcus aureus bacteremia
Decubitus ulcer of sacral region, stage 4
Staphylococcus aureus bacteremia

## 2017-02-16 NOTE — PROGRESS NOTE ADULT - PROBLEM SELECTOR PROBLEM 1
Gram-negative sepsis
Sepsis, due to unspecified organism
Coagulase negative Staphylococcus bacteremia
Gram-negative sepsis
Sepsis, due to unspecified organism

## 2017-02-16 NOTE — PROGRESS NOTE ADULT - PROBLEM SELECTOR PLAN 1
F/U on repeat BC (done yesterday )  If BC neg in am -d/c home tomorrow  give a dose of vanco today(day 10/14)  leukocytosis improved

## 2017-02-16 NOTE — DISCHARGE NOTE ADULT - MEDICATION SUMMARY - MEDICATIONS TO CHANGE
I will SWITCH the dose or number of times a day I take the medications listed below when I get home from the hospital:    carvedilol 25 mg oral tablet  -- 1 tab(s) by mouth every 12 hours x 30 days

## 2017-02-17 VITALS — OXYGEN SATURATION: 97 %

## 2017-02-17 LAB
HCT VFR BLD CALC: 19.8 % — CRITICAL LOW (ref 39–50)
HCT VFR BLD CALC: 30.3 % — LOW (ref 39–50)
HGB BLD-MCNC: 10.5 G/DL — LOW (ref 13–17)
HGB BLD-MCNC: 6.7 G/DL — CRITICAL LOW (ref 13–17)
MCHC RBC-ENTMCNC: 30.2 PG — SIGNIFICANT CHANGE UP (ref 27–34)
MCHC RBC-ENTMCNC: 30.7 PG — SIGNIFICANT CHANGE UP (ref 27–34)
MCHC RBC-ENTMCNC: 34 GM/DL — SIGNIFICANT CHANGE UP (ref 32–36)
MCHC RBC-ENTMCNC: 34.5 GM/DL — SIGNIFICANT CHANGE UP (ref 32–36)
MCV RBC AUTO: 87.4 FL — SIGNIFICANT CHANGE UP (ref 80–100)
MCV RBC AUTO: 90.5 FL — SIGNIFICANT CHANGE UP (ref 80–100)
PLATELET # BLD AUTO: 254 K/UL — SIGNIFICANT CHANGE UP (ref 150–400)
PLATELET # BLD AUTO: 309 K/UL — SIGNIFICANT CHANGE UP (ref 150–400)
RBC # BLD: 2.19 M/UL — LOW (ref 4.2–5.8)
RBC # BLD: 3.47 M/UL — LOW (ref 4.2–5.8)
RBC # FLD: 15.6 % — HIGH (ref 11–15)
RBC # FLD: 15.9 % — HIGH (ref 11–15)
WBC # BLD: 7.7 K/UL — SIGNIFICANT CHANGE UP (ref 3.8–10.5)
WBC # BLD: 8.5 K/UL — SIGNIFICANT CHANGE UP (ref 3.8–10.5)
WBC # FLD AUTO: 7.7 K/UL — SIGNIFICANT CHANGE UP (ref 3.8–10.5)
WBC # FLD AUTO: 8.5 K/UL — SIGNIFICANT CHANGE UP (ref 3.8–10.5)

## 2017-02-17 PROCEDURE — 99233 SBSQ HOSP IP/OBS HIGH 50: CPT

## 2017-02-17 RX ORDER — MIDODRINE HYDROCHLORIDE 2.5 MG/1
1 TABLET ORAL
Qty: 60 | Refills: 0 | OUTPATIENT
Start: 2017-02-17 | End: 2017-03-19

## 2017-02-17 RX ORDER — VANCOMYCIN HCL 1 G
750 VIAL (EA) INTRAVENOUS
Qty: 0 | Refills: 0 | COMMUNITY

## 2017-02-17 RX ORDER — LEVETIRACETAM 250 MG/1
1 TABLET, FILM COATED ORAL
Qty: 60 | Refills: 0 | OUTPATIENT
Start: 2017-02-17 | End: 2017-03-19

## 2017-02-17 RX ORDER — OXCARBAZEPINE 300 MG/1
1 TABLET, FILM COATED ORAL
Qty: 60 | Refills: 0 | OUTPATIENT
Start: 2017-02-17 | End: 2017-03-19

## 2017-02-17 RX ORDER — RISPERIDONE 4 MG/1
1 TABLET ORAL
Qty: 30 | Refills: 0 | OUTPATIENT
Start: 2017-02-17 | End: 2017-03-19

## 2017-02-17 RX ORDER — SIMVASTATIN 20 MG/1
1 TABLET, FILM COATED ORAL
Qty: 30 | Refills: 0 | OUTPATIENT
Start: 2017-02-17 | End: 2017-03-19

## 2017-02-17 RX ADMIN — Medication 3 MILLILITER(S): at 17:21

## 2017-02-17 RX ADMIN — HEPARIN SODIUM 5000 UNIT(S): 5000 INJECTION INTRAVENOUS; SUBCUTANEOUS at 17:03

## 2017-02-17 RX ADMIN — LEVETIRACETAM 750 MILLIGRAM(S): 250 TABLET, FILM COATED ORAL at 06:13

## 2017-02-17 RX ADMIN — HEPARIN SODIUM 5000 UNIT(S): 5000 INJECTION INTRAVENOUS; SUBCUTANEOUS at 06:14

## 2017-02-17 RX ADMIN — Medication 667 MILLIGRAM(S): at 07:52

## 2017-02-17 RX ADMIN — ERYTHROPOIETIN 10000 UNIT(S): 10000 INJECTION, SOLUTION INTRAVENOUS; SUBCUTANEOUS at 12:23

## 2017-02-17 RX ADMIN — Medication 1 TABLET(S): at 15:00

## 2017-02-17 RX ADMIN — Medication 1 APPLICATION(S): at 17:05

## 2017-02-17 RX ADMIN — Medication 3 MILLILITER(S): at 05:59

## 2017-02-17 RX ADMIN — LEVETIRACETAM 750 MILLIGRAM(S): 250 TABLET, FILM COATED ORAL at 17:03

## 2017-02-17 RX ADMIN — RISPERIDONE 1 MILLIGRAM(S): 4 TABLET ORAL at 17:04

## 2017-02-17 RX ADMIN — OXCARBAZEPINE 600 MILLIGRAM(S): 300 TABLET, FILM COATED ORAL at 06:13

## 2017-02-17 RX ADMIN — OXCARBAZEPINE 600 MILLIGRAM(S): 300 TABLET, FILM COATED ORAL at 17:04

## 2017-02-17 RX ADMIN — SODIUM CHLORIDE 3 MILLILITER(S): 9 INJECTION INTRAMUSCULAR; INTRAVENOUS; SUBCUTANEOUS at 15:00

## 2017-02-17 RX ADMIN — Medication 667 MILLIGRAM(S): at 17:05

## 2017-02-17 NOTE — PROGRESS NOTE ADULT - SUBJECTIVE AND OBJECTIVE BOX
Patient for HD today. Hgb lower trend.  Tolerating HD treatments well.  HD prescription reviewed.        PHYSICAL EXAM:      T(C): 37.6, Max: 37.7 (02-16 @ 14:00)  HR: 95 (81 - 95)  BP: 135/65 (134/69 - 157/80)  RR: 17 (16 - 17)  SpO2: 99% (96% - 100%)  Wt(kg): --  Respiratory: clear anteriorly, decreased BS at bases  Cardiovascular: S1 S2  Gastrointestinal: soft NT ND +BS  Extremities:   1 + edema                                          6.7    7.7   )-----------( 309      ( 17 Feb 2017 10:13 )             19.8             Maintenance hemodialysis.  PRBC transfusion at HD.  Blood pressure trends, BFR, AP, , UF goal reviewed.

## 2017-02-17 NOTE — CHART NOTE - NSCHARTNOTEFT_GEN_A_CORE
As discussed with Dr Franklin, Right femoral emily catheter removed. Pt is in NAD and tolerated well. Pressure held x 10 minutes, hemostasis obtained. Dressing applied. Patient has left sided permacath, for HD today. Continue present medical management.

## 2017-02-17 NOTE — PROVIDER CONTACT NOTE (CRITICAL VALUE NOTIFICATION) - ACTION/TREATMENT ORDERED:
None ordered
Pt to be dialyzed with 2 units PRBC's intradialysis
No new interventions at this time
pt currently getting zosyn Q12 hours. No new orders have been discussed at this time

## 2017-02-17 NOTE — PROVIDER CONTACT NOTE (CRITICAL VALUE NOTIFICATION) - TEST AND RESULT REPORTED:
Abcess culture collected 2/7/17 for sacral wound preliminary result rare MRSA, few presumptive candida albican and rare enterococcus Sandrapecies
Hg 6.7 and Hc 19.8
2nd blood culture: Gram positive cocci in clusters in anerobic bottle
Abscess sacral: rare MRSA  few presumptive candida albicants
Blood culture: Gram positive cocci in clusters aerobic bottle

## 2017-02-21 LAB
CULTURE RESULTS: SIGNIFICANT CHANGE UP
SPECIMEN SOURCE: SIGNIFICANT CHANGE UP

## 2017-02-22 DIAGNOSIS — R63.6 UNDERWEIGHT: ICD-10-CM

## 2017-02-22 DIAGNOSIS — Z86.73 PERSONAL HISTORY OF TRANSIENT ISCHEMIC ATTACK (TIA), AND CEREBRAL INFARCTION WITHOUT RESIDUAL DEFICITS: ICD-10-CM

## 2017-02-22 DIAGNOSIS — Z93.1 GASTROSTOMY STATUS: ICD-10-CM

## 2017-02-22 DIAGNOSIS — Z74.01 BED CONFINEMENT STATUS: ICD-10-CM

## 2017-02-22 DIAGNOSIS — F03.90 UNSPECIFIED DEMENTIA, UNSPECIFIED SEVERITY, WITHOUT BEHAVIORAL DISTURBANCE, PSYCHOTIC DISTURBANCE, MOOD DISTURBANCE, AND ANXIETY: ICD-10-CM

## 2017-02-22 DIAGNOSIS — R62.7 ADULT FAILURE TO THRIVE: ICD-10-CM

## 2017-02-22 DIAGNOSIS — J90 PLEURAL EFFUSION, NOT ELSEWHERE CLASSIFIED: ICD-10-CM

## 2017-02-22 DIAGNOSIS — A41.9 SEPSIS, UNSPECIFIED ORGANISM: ICD-10-CM

## 2017-02-22 DIAGNOSIS — G93.41 METABOLIC ENCEPHALOPATHY: ICD-10-CM

## 2017-02-22 DIAGNOSIS — E11.22 TYPE 2 DIABETES MELLITUS WITH DIABETIC CHRONIC KIDNEY DISEASE: ICD-10-CM

## 2017-02-22 DIAGNOSIS — R13.10 DYSPHAGIA, UNSPECIFIED: ICD-10-CM

## 2017-02-22 DIAGNOSIS — B95.62 METHICILLIN RESISTANT STAPHYLOCOCCUS AUREUS INFECTION AS THE CAUSE OF DISEASES CLASSIFIED ELSEWHERE: ICD-10-CM

## 2017-02-22 DIAGNOSIS — I12.0 HYPERTENSIVE CHRONIC KIDNEY DISEASE WITH STAGE 5 CHRONIC KIDNEY DISEASE OR END STAGE RENAL DISEASE: ICD-10-CM

## 2017-02-22 DIAGNOSIS — G40.909 EPILEPSY, UNSPECIFIED, NOT INTRACTABLE, WITHOUT STATUS EPILEPTICUS: ICD-10-CM

## 2017-02-22 DIAGNOSIS — L89.154 PRESSURE ULCER OF SACRAL REGION, STAGE 4: ICD-10-CM

## 2017-02-22 DIAGNOSIS — F79 UNSPECIFIED INTELLECTUAL DISABILITIES: ICD-10-CM

## 2017-02-22 DIAGNOSIS — E11.65 TYPE 2 DIABETES MELLITUS WITH HYPERGLYCEMIA: ICD-10-CM

## 2017-02-22 DIAGNOSIS — D63.1 ANEMIA IN CHRONIC KIDNEY DISEASE: ICD-10-CM

## 2017-02-22 DIAGNOSIS — T82.49XA OTHER COMPLICATION OF VASCULAR DIALYSIS CATHETER, INITIAL ENCOUNTER: ICD-10-CM

## 2017-02-22 DIAGNOSIS — R47.01 APHASIA: ICD-10-CM

## 2017-02-22 DIAGNOSIS — N18.6 END STAGE RENAL DISEASE: ICD-10-CM

## 2017-02-22 DIAGNOSIS — Z99.2 DEPENDENCE ON RENAL DIALYSIS: ICD-10-CM

## 2017-07-20 ENCOUNTER — INPATIENT (INPATIENT)
Facility: HOSPITAL | Age: 62
LOS: 0 days | End: 2017-07-21
Attending: INTERNAL MEDICINE | Admitting: INTERNAL MEDICINE
Payer: MEDICARE

## 2017-07-20 VITALS — HEIGHT: 70 IN | WEIGHT: 175.05 LBS

## 2017-07-20 DIAGNOSIS — L89.154 PRESSURE ULCER OF SACRAL REGION, STAGE 4: ICD-10-CM

## 2017-07-20 DIAGNOSIS — I95.9 HYPOTENSION, UNSPECIFIED: ICD-10-CM

## 2017-07-20 DIAGNOSIS — J96.00 ACUTE RESPIRATORY FAILURE, UNSPECIFIED WHETHER WITH HYPOXIA OR HYPERCAPNIA: ICD-10-CM

## 2017-07-20 DIAGNOSIS — Z93.1 GASTROSTOMY STATUS: ICD-10-CM

## 2017-07-20 DIAGNOSIS — A41.9 SEPSIS, UNSPECIFIED ORGANISM: ICD-10-CM

## 2017-07-20 DIAGNOSIS — E11.22 TYPE 2 DIABETES MELLITUS WITH DIABETIC CHRONIC KIDNEY DISEASE: ICD-10-CM

## 2017-07-20 DIAGNOSIS — N18.6 END STAGE RENAL DISEASE: ICD-10-CM

## 2017-07-20 DIAGNOSIS — F79 UNSPECIFIED INTELLECTUAL DISABILITIES: ICD-10-CM

## 2017-07-20 DIAGNOSIS — E87.2 ACIDOSIS: ICD-10-CM

## 2017-07-20 DIAGNOSIS — Z93.1 GASTROSTOMY STATUS: Chronic | ICD-10-CM

## 2017-07-20 DIAGNOSIS — I46.9 CARDIAC ARREST, CAUSE UNSPECIFIED: ICD-10-CM

## 2017-07-20 DIAGNOSIS — Z66 DO NOT RESUSCITATE: ICD-10-CM

## 2017-07-20 DIAGNOSIS — Z99.2 DEPENDENCE ON RENAL DIALYSIS: ICD-10-CM

## 2017-07-20 DIAGNOSIS — I12.0 HYPERTENSIVE CHRONIC KIDNEY DISEASE WITH STAGE 5 CHRONIC KIDNEY DISEASE OR END STAGE RENAL DISEASE: ICD-10-CM

## 2017-07-20 DIAGNOSIS — E78.5 HYPERLIPIDEMIA, UNSPECIFIED: ICD-10-CM

## 2017-07-20 DIAGNOSIS — G93.1 ANOXIC BRAIN DAMAGE, NOT ELSEWHERE CLASSIFIED: ICD-10-CM

## 2017-07-20 DIAGNOSIS — E87.5 HYPERKALEMIA: ICD-10-CM

## 2017-07-20 DIAGNOSIS — R57.9 SHOCK, UNSPECIFIED: ICD-10-CM

## 2017-07-20 LAB
ALBUMIN SERPL ELPH-MCNC: 1.7 G/DL — LOW (ref 3.3–5)
ALP SERPL-CCNC: 142 U/L — HIGH (ref 40–120)
ALT FLD-CCNC: 2895 U/L — HIGH (ref 12–78)
ANION GAP SERPL CALC-SCNC: 22 MMOL/L — HIGH (ref 5–17)
ANION GAP SERPL CALC-SCNC: 29 MMOL/L — HIGH (ref 5–17)
APTT BLD: 76.5 SEC — HIGH (ref 27.5–37.4)
AST SERPL-CCNC: 6322 U/L — HIGH (ref 15–37)
BASE EXCESS BLDA CALC-SCNC: -14.7 MMOL/L — LOW (ref -2–2)
BASE EXCESS BLDA CALC-SCNC: -17 MMOL/L — LOW (ref -2–2)
BASE EXCESS BLDA CALC-SCNC: -17.4 MMOL/L — LOW (ref -2–2)
BILIRUB SERPL-MCNC: 0.5 MG/DL — SIGNIFICANT CHANGE UP (ref 0.2–1.2)
BLOOD GAS COMMENTS: SIGNIFICANT CHANGE UP
BLOOD GAS SOURCE: SIGNIFICANT CHANGE UP
BUN SERPL-MCNC: 52 MG/DL — HIGH (ref 7–23)
BUN SERPL-MCNC: 57 MG/DL — HIGH (ref 7–23)
CALCIUM SERPL-MCNC: 10.7 MG/DL — HIGH (ref 8.5–10.1)
CALCIUM SERPL-MCNC: 8.2 MG/DL — LOW (ref 8.5–10.1)
CHLORIDE SERPL-SCNC: 100 MMOL/L — SIGNIFICANT CHANGE UP (ref 96–108)
CHLORIDE SERPL-SCNC: 106 MMOL/L — SIGNIFICANT CHANGE UP (ref 96–108)
CK MB BLD-MCNC: 0.6 % — SIGNIFICANT CHANGE UP (ref 0–3.5)
CK MB CFR SERPL CALC: 2.1 NG/ML — SIGNIFICANT CHANGE UP (ref 0.5–3.6)
CK SERPL-CCNC: 351 U/L — HIGH (ref 26–308)
CO2 SERPL-SCNC: 11 MMOL/L — LOW (ref 22–31)
CO2 SERPL-SCNC: 21 MMOL/L — LOW (ref 22–31)
CREAT SERPL-MCNC: 5.94 MG/DL — HIGH (ref 0.5–1.3)
CREAT SERPL-MCNC: 6.06 MG/DL — HIGH (ref 0.5–1.3)
GLUCOSE SERPL-MCNC: 200 MG/DL — HIGH (ref 70–99)
GLUCOSE SERPL-MCNC: 268 MG/DL — HIGH (ref 70–99)
HCO3 BLDA-SCNC: 10 MMOL/L — LOW (ref 21–29)
HCO3 BLDA-SCNC: 18 MMOL/L — LOW (ref 21–29)
HCO3 BLDA-SCNC: 9 MMOL/L — LOW (ref 21–29)
HCT VFR BLD CALC: 34 % — LOW (ref 39–50)
HGB BLD-MCNC: 9.9 G/DL — LOW (ref 13–17)
HOROWITZ INDEX BLDA+IHG-RTO: 100 — SIGNIFICANT CHANGE UP
HOROWITZ INDEX BLDA+IHG-RTO: 65 — SIGNIFICANT CHANGE UP
HOROWITZ INDEX BLDA+IHG-RTO: 65 — SIGNIFICANT CHANGE UP
INR BLD: 1.52 RATIO — HIGH (ref 0.88–1.16)
LACTATE SERPL-SCNC: 12.2 MMOL/L — CRITICAL HIGH (ref 0.7–2)
LACTATE SERPL-SCNC: 15.1 MMOL/L — CRITICAL HIGH (ref 0.7–2)
MAGNESIUM SERPL-MCNC: 3.3 MG/DL — HIGH (ref 1.6–2.6)
MCHC RBC-ENTMCNC: 28.5 PG — SIGNIFICANT CHANGE UP (ref 27–34)
MCHC RBC-ENTMCNC: 29.1 GM/DL — LOW (ref 32–36)
MCV RBC AUTO: 97.7 FL — SIGNIFICANT CHANGE UP (ref 80–100)
NT-PROBNP SERPL-SCNC: HIGH PG/ML (ref 0–125)
PCO2 BLDA: 23 MMHG — LOW (ref 32–46)
PCO2 BLDA: 25 MMHG — LOW (ref 32–46)
PCO2 BLDA: 79 MMHG — CRITICAL HIGH (ref 32–46)
PH BLD: 6.98 — CRITICAL LOW (ref 7.35–7.45)
PH BLD: 7.2 — CRITICAL LOW (ref 7.35–7.45)
PH BLD: 7.21 — LOW (ref 7.35–7.45)
PLATELET # BLD AUTO: 186 K/UL — SIGNIFICANT CHANGE UP (ref 150–400)
PO2 BLDA: 109 MMHG — HIGH (ref 74–108)
PO2 BLDA: 75 MMHG — SIGNIFICANT CHANGE UP (ref 74–108)
PO2 BLDA: 81 MMHG — SIGNIFICANT CHANGE UP (ref 74–108)
POTASSIUM SERPL-MCNC: 5.7 MMOL/L — HIGH (ref 3.5–5.3)
POTASSIUM SERPL-MCNC: 6.6 MMOL/L — CRITICAL HIGH (ref 3.5–5.3)
POTASSIUM SERPL-SCNC: 5.7 MMOL/L — HIGH (ref 3.5–5.3)
POTASSIUM SERPL-SCNC: 6.6 MMOL/L — CRITICAL HIGH (ref 3.5–5.3)
PROT SERPL-MCNC: 6.3 GM/DL — SIGNIFICANT CHANGE UP (ref 6–8.3)
PROTHROM AB SERPL-ACNC: 16.7 SEC — HIGH (ref 9.8–12.7)
RBC # BLD: 3.48 M/UL — LOW (ref 4.2–5.8)
RBC # FLD: 16.8 % — HIGH (ref 11–15)
SAO2 % BLDA: 88 % — LOW (ref 92–96)
SAO2 % BLDA: 91 % — LOW (ref 92–96)
SAO2 % BLDA: 92 % — SIGNIFICANT CHANGE UP (ref 92–96)
SODIUM SERPL-SCNC: 143 MMOL/L — SIGNIFICANT CHANGE UP (ref 135–145)
SODIUM SERPL-SCNC: 146 MMOL/L — HIGH (ref 135–145)
TROPONIN I SERPL-MCNC: 0.1 NG/ML — HIGH (ref 0.01–0.04)
WBC # BLD: 17 K/UL — HIGH (ref 3.8–10.5)
WBC # FLD AUTO: 17 K/UL — HIGH (ref 3.8–10.5)

## 2017-07-20 PROCEDURE — 99291 CRITICAL CARE FIRST HOUR: CPT

## 2017-07-20 PROCEDURE — 36556 INSERT NON-TUNNEL CV CATH: CPT

## 2017-07-20 PROCEDURE — 74176 CT ABD & PELVIS W/O CONTRAST: CPT | Mod: 26

## 2017-07-20 PROCEDURE — 70450 CT HEAD/BRAIN W/O DYE: CPT | Mod: 26

## 2017-07-20 PROCEDURE — 93010 ELECTROCARDIOGRAM REPORT: CPT

## 2017-07-20 PROCEDURE — 71010: CPT | Mod: 26

## 2017-07-20 RX ORDER — GLUCAGON INJECTION, SOLUTION 0.5 MG/.1ML
1 INJECTION, SOLUTION SUBCUTANEOUS ONCE
Qty: 0 | Refills: 0 | Status: DISCONTINUED | OUTPATIENT
Start: 2017-07-20 | End: 2017-07-21

## 2017-07-20 RX ORDER — CHLORHEXIDINE GLUCONATE 213 G/1000ML
15 SOLUTION TOPICAL
Qty: 0 | Refills: 0 | Status: DISCONTINUED | OUTPATIENT
Start: 2017-07-20 | End: 2017-07-21

## 2017-07-20 RX ORDER — VANCOMYCIN HCL 1 G
1000 VIAL (EA) INTRAVENOUS ONCE
Qty: 0 | Refills: 0 | Status: COMPLETED | OUTPATIENT
Start: 2017-07-20 | End: 2017-07-20

## 2017-07-20 RX ORDER — EPINEPHRINE 0.3 MG/.3ML
0.01 INJECTION INTRAMUSCULAR; SUBCUTANEOUS
Qty: 4 | Refills: 0 | Status: DISCONTINUED | OUTPATIENT
Start: 2017-07-20 | End: 2017-07-21

## 2017-07-20 RX ORDER — HEPARIN SODIUM 5000 [USP'U]/ML
5000 INJECTION INTRAVENOUS; SUBCUTANEOUS EVERY 12 HOURS
Qty: 0 | Refills: 0 | Status: DISCONTINUED | OUTPATIENT
Start: 2017-07-20 | End: 2017-07-21

## 2017-07-20 RX ORDER — PANTOPRAZOLE SODIUM 20 MG/1
40 TABLET, DELAYED RELEASE ORAL DAILY
Qty: 0 | Refills: 0 | Status: DISCONTINUED | OUTPATIENT
Start: 2017-07-20 | End: 2017-07-21

## 2017-07-20 RX ORDER — DEXTROSE 50 % IN WATER 50 %
25 SYRINGE (ML) INTRAVENOUS ONCE
Qty: 0 | Refills: 0 | Status: DISCONTINUED | OUTPATIENT
Start: 2017-07-20 | End: 2017-07-21

## 2017-07-20 RX ORDER — SODIUM BICARBONATE 1 MEQ/ML
325 SYRINGE (ML) INTRAVENOUS ONCE
Qty: 0 | Refills: 0 | Status: DISCONTINUED | OUTPATIENT
Start: 2017-07-20 | End: 2017-07-20

## 2017-07-20 RX ORDER — CALCIUM GLUCONATE 100 MG/ML
1 VIAL (ML) INTRAVENOUS ONCE
Qty: 1 | Refills: 0 | Status: COMPLETED | OUTPATIENT
Start: 2017-07-20 | End: 2017-07-20

## 2017-07-20 RX ORDER — PIPERACILLIN AND TAZOBACTAM 4; .5 G/20ML; G/20ML
3.38 INJECTION, POWDER, LYOPHILIZED, FOR SOLUTION INTRAVENOUS ONCE
Qty: 0 | Refills: 0 | Status: COMPLETED | OUTPATIENT
Start: 2017-07-20 | End: 2017-07-20

## 2017-07-20 RX ORDER — PIPERACILLIN AND TAZOBACTAM 4; .5 G/20ML; G/20ML
3.38 INJECTION, POWDER, LYOPHILIZED, FOR SOLUTION INTRAVENOUS EVERY 12 HOURS
Qty: 0 | Refills: 0 | Status: DISCONTINUED | OUTPATIENT
Start: 2017-07-20 | End: 2017-07-21

## 2017-07-20 RX ORDER — PIPERACILLIN AND TAZOBACTAM 4; .5 G/20ML; G/20ML
3.38 INJECTION, POWDER, LYOPHILIZED, FOR SOLUTION INTRAVENOUS EVERY 12 HOURS
Qty: 0 | Refills: 0 | Status: DISCONTINUED | OUTPATIENT
Start: 2017-07-21 | End: 2017-07-20

## 2017-07-20 RX ORDER — NOREPINEPHRINE BITARTRATE/D5W 8 MG/250ML
0.1 PLASTIC BAG, INJECTION (ML) INTRAVENOUS
Qty: 16 | Refills: 0 | Status: DISCONTINUED | OUTPATIENT
Start: 2017-07-20 | End: 2017-07-21

## 2017-07-20 RX ORDER — SODIUM BICARBONATE 1 MEQ/ML
50 SYRINGE (ML) INTRAVENOUS ONCE
Qty: 0 | Refills: 0 | Status: COMPLETED | OUTPATIENT
Start: 2017-07-20 | End: 2017-07-20

## 2017-07-20 RX ORDER — SODIUM CHLORIDE 9 MG/ML
1000 INJECTION, SOLUTION INTRAVENOUS
Qty: 0 | Refills: 0 | Status: DISCONTINUED | OUTPATIENT
Start: 2017-07-20 | End: 2017-07-21

## 2017-07-20 RX ORDER — CHLORHEXIDINE GLUCONATE 213 G/1000ML
1 SOLUTION TOPICAL DAILY
Qty: 0 | Refills: 0 | Status: DISCONTINUED | OUTPATIENT
Start: 2017-07-20 | End: 2017-07-21

## 2017-07-20 RX ORDER — ALBUTEROL 90 UG/1
2.5 AEROSOL, METERED ORAL
Qty: 0 | Refills: 0 | Status: COMPLETED | OUTPATIENT
Start: 2017-07-20 | End: 2017-07-20

## 2017-07-20 RX ORDER — SODIUM CHLORIDE 9 MG/ML
500 INJECTION INTRAMUSCULAR; INTRAVENOUS; SUBCUTANEOUS ONCE
Qty: 0 | Refills: 0 | Status: COMPLETED | OUTPATIENT
Start: 2017-07-20 | End: 2017-07-20

## 2017-07-20 RX ORDER — DEXTROSE 50 % IN WATER 50 %
12.5 SYRINGE (ML) INTRAVENOUS ONCE
Qty: 0 | Refills: 0 | Status: DISCONTINUED | OUTPATIENT
Start: 2017-07-20 | End: 2017-07-21

## 2017-07-20 RX ORDER — DEXTROSE 50 % IN WATER 50 %
50 SYRINGE (ML) INTRAVENOUS ONCE
Qty: 0 | Refills: 0 | Status: COMPLETED | OUTPATIENT
Start: 2017-07-20 | End: 2017-07-20

## 2017-07-20 RX ORDER — VASOPRESSIN 20 [USP'U]/ML
0.04 INJECTION INTRAVENOUS
Qty: 100 | Refills: 0 | Status: DISCONTINUED | OUTPATIENT
Start: 2017-07-20 | End: 2017-07-21

## 2017-07-20 RX ORDER — INSULIN HUMAN 100 [IU]/ML
10 INJECTION, SOLUTION SUBCUTANEOUS ONCE
Qty: 0 | Refills: 0 | Status: COMPLETED | OUTPATIENT
Start: 2017-07-20 | End: 2017-07-20

## 2017-07-20 RX ORDER — NOREPINEPHRINE BITARTRATE/D5W 8 MG/250ML
0.5 PLASTIC BAG, INJECTION (ML) INTRAVENOUS
Qty: 8 | Refills: 0 | Status: DISCONTINUED | OUTPATIENT
Start: 2017-07-20 | End: 2017-07-20

## 2017-07-20 RX ORDER — INSULIN LISPRO 100/ML
VIAL (ML) SUBCUTANEOUS EVERY 4 HOURS
Qty: 0 | Refills: 0 | Status: DISCONTINUED | OUTPATIENT
Start: 2017-07-20 | End: 2017-07-21

## 2017-07-20 RX ORDER — SODIUM CHLORIDE 9 MG/ML
1000 INJECTION INTRAMUSCULAR; INTRAVENOUS; SUBCUTANEOUS ONCE
Qty: 0 | Refills: 0 | Status: DISCONTINUED | OUTPATIENT
Start: 2017-07-20 | End: 2017-07-21

## 2017-07-20 RX ORDER — DEXTROSE 50 % IN WATER 50 %
1 SYRINGE (ML) INTRAVENOUS ONCE
Qty: 0 | Refills: 0 | Status: DISCONTINUED | OUTPATIENT
Start: 2017-07-20 | End: 2017-07-21

## 2017-07-20 RX ADMIN — INSULIN HUMAN 10 UNIT(S): 100 INJECTION, SOLUTION SUBCUTANEOUS at 14:29

## 2017-07-20 RX ADMIN — Medication 74.44 MICROGRAM(S)/KG/MIN: at 11:08

## 2017-07-20 RX ADMIN — ALBUTEROL 2.5 MILLIGRAM(S): 90 AEROSOL, METERED ORAL at 14:02

## 2017-07-20 RX ADMIN — SODIUM CHLORIDE 1000 MILLILITER(S): 9 INJECTION INTRAMUSCULAR; INTRAVENOUS; SUBCUTANEOUS at 16:25

## 2017-07-20 RX ADMIN — PIPERACILLIN AND TAZOBACTAM 200 GRAM(S): 4; .5 INJECTION, POWDER, LYOPHILIZED, FOR SOLUTION INTRAVENOUS at 11:38

## 2017-07-20 RX ADMIN — PANTOPRAZOLE SODIUM 40 MILLIGRAM(S): 20 TABLET, DELAYED RELEASE ORAL at 17:37

## 2017-07-20 RX ADMIN — Medication 50 MILLIEQUIVALENT(S): at 11:23

## 2017-07-20 RX ADMIN — Medication 250 MILLIGRAM(S): at 13:27

## 2017-07-20 RX ADMIN — HEPARIN SODIUM 5000 UNIT(S): 5000 INJECTION INTRAVENOUS; SUBCUTANEOUS at 17:37

## 2017-07-20 RX ADMIN — Medication 74.44 MICROGRAM(S)/KG/MIN: at 16:48

## 2017-07-20 RX ADMIN — EPINEPHRINE 1.8 MICROGRAM(S)/KG/MIN: 0.3 INJECTION INTRAMUSCULAR; SUBCUTANEOUS at 17:40

## 2017-07-20 RX ADMIN — SODIUM CHLORIDE 1000 MILLILITER(S): 9 INJECTION INTRAMUSCULAR; INTRAVENOUS; SUBCUTANEOUS at 17:10

## 2017-07-20 RX ADMIN — EPINEPHRINE 1.8 MICROGRAM(S)/KG/MIN: 0.3 INJECTION INTRAMUSCULAR; SUBCUTANEOUS at 23:36

## 2017-07-20 RX ADMIN — ALBUTEROL 2.5 MILLIGRAM(S): 90 AEROSOL, METERED ORAL at 14:00

## 2017-07-20 RX ADMIN — PIPERACILLIN AND TAZOBACTAM 200 GRAM(S): 4; .5 INJECTION, POWDER, LYOPHILIZED, FOR SOLUTION INTRAVENOUS at 21:05

## 2017-07-20 RX ADMIN — Medication 1: at 17:49

## 2017-07-20 RX ADMIN — SODIUM CHLORIDE 500 MILLILITER(S): 9 INJECTION INTRAMUSCULAR; INTRAVENOUS; SUBCUTANEOUS at 11:01

## 2017-07-20 RX ADMIN — Medication 74.44 MICROGRAM(S)/KG/MIN: at 15:50

## 2017-07-20 RX ADMIN — CHLORHEXIDINE GLUCONATE 1 APPLICATION(S): 213 SOLUTION TOPICAL at 17:37

## 2017-07-20 RX ADMIN — Medication 50 MILLILITER(S): at 14:29

## 2017-07-20 RX ADMIN — SODIUM CHLORIDE 500 MILLILITER(S): 9 INJECTION INTRAMUSCULAR; INTRAVENOUS; SUBCUTANEOUS at 14:30

## 2017-07-20 RX ADMIN — VASOPRESSIN 2.4 UNIT(S)/MIN: 20 INJECTION INTRAVENOUS at 15:50

## 2017-07-20 RX ADMIN — CHLORHEXIDINE GLUCONATE 15 MILLILITER(S): 213 SOLUTION TOPICAL at 17:37

## 2017-07-20 RX ADMIN — ALBUTEROL 2.5 MILLIGRAM(S): 90 AEROSOL, METERED ORAL at 14:30

## 2017-07-20 RX ADMIN — Medication 200 GRAM(S): at 14:16

## 2017-07-20 RX ADMIN — Medication 4.49 MICROGRAM(S)/KG/MIN: at 21:03

## 2017-07-20 NOTE — CONSULT NOTE ADULT - SUBJECTIVE AND OBJECTIVE BOX
Nephrology Consultation: MD JUAN Krishnamurthy PIERRE  62y    HPI: 62 male with a history of MR along with HTN, HLD, ESRD on HD admitted with history of unresponsiveness.  patient has cardiac arrest and is presently intubated. He is generally not very responsive. He is now found to have high potassium and received calcium so far. Being evaluated by the ICU at this time      PAST MEDICAL & SURGICAL HISTORY:  Sepsis associated with vascular access catheter, subsequent encounter  Hyperglycemia  Altered mental status  Dialysis patient  Diabetes  Lipids abnormal  Renal failure  Seizure  HTN (hypertension)  Mental retardation  PEG (percutaneous endoscopic gastrostomy) status      Allergies    No Known Allergies    Intolerances            FAMILY HISTORY:  No pertinent family history in first degree relatives      SOCIAL HISTORY:    REVIEW OF SYSTEMS:    Constitutional: No fever, weight loss or fatigue  Eyes: No eye pain, visual disturbances, or discharge  ENT:  No difficulty hearing, tinnitus, vertigo; No sinus or throat pain  Neck: No pain or stiffness  Breasts: No pain, masses or nipple discharge  Respiratory: No cough, wheezing, chills or hemoptysis  Cardiovascular: No chest pain, palpitations, shortness of breath, dizziness or leg swelling  Gastrointestinal: No abdominal or epigastric pain. No nausea, vomiting or hematemesis; No diarrhea or constipation. No melena or hematochezia.  Genitourinary: No dysuria, frequency, hematuria or incontinence  Rectal: No pain, hemorrhoids or incontinence  Neurological: No headaches, memory loss, loss of strength, numbness or tremors  Skin: No itching, burning, rashes or lesions   Lymph Nodes: No enlarged glands  Endocrine: No heat or cold intolerance; No hair loss  Musculoskeletal: No joint pain or swelling; No muscle, back or extremity pain  Psychiatric: No depression, anxiety, mood swings or difficulty sleeping  Heme/Lymph: No easy bruising or bleeding gums  Allergy and Immunologic: No hives or eczema    piperacillin/tazobactam IVPB. 3.375 Gram(s) IV Intermittent every 12 hours  norepinephrine Infusion 0.5 MICROgram(s)/kG/Min IV Continuous <Continuous>  ALBUTerol    0.083%.. 2.5 milliGRAM(s) Nebulizer every 20 minutes  insulin regular  human recombinant. 10 Unit(s) IV Push once  dextrose 50% Injectable 50 milliLiter(s) IV Push Once  sodium chloride 0.9% Bolus 500 milliLiter(s) IV Bolus once      Vital Signs Last 24 Hrs  T(C): 36.2 (20 Jul 2017 13:28), Max: 36.2 (20 Jul 2017 13:28)  T(F): 97.2 (20 Jul 2017 13:28), Max: 97.2 (20 Jul 2017 13:28)  HR: 97 (20 Jul 2017 13:04) (0 - 99)  BP: 114/51 (20 Jul 2017 13:04) (0/0 - 114/51)  BP(mean): --  RR: 14 (20 Jul 2017 13:04) (0 - 14)  SpO2: 97% (20 Jul 2017 13:04) (97% - 99%)    PHYSICAL EXAM:    Constitutional: NAD, well-groomed, well-developed  HEENT: PERRLA, EOMI, Normal Hearing, MMM  Neck: No LAD, No JVD  Back: Normal spine flexure, No CVA tenderness  Respiratory: CTAB/L   Cardiovascular: S1 and S2, RRR, no M/G/R  Gastrointestinal: BS+, soft, NT/ND  Extremities: No peripheral edema  Vascular: 2+ peripheral pulses  Neurological: unresponsive  Skin: No rashes      LABS:                        9.9    17.0  )-----------( 186      ( 20 Jul 2017 10:47 )             34.0     07-20    143  |  100  |  52<H>  ----------------------------<  268<H>  6.6<HH>   |  21<L>  |  6.06<H>    Ca    10.7<H>      20 Jul 2017 10:47  Mg     3.3     07-20    TPro  6.3  /  Alb  1.7<L>  /  TBili  0.5  /  DBili  x   /  AST  6322<H>  /  ALT  2895<H>  /  AlkPhos  142<H>  07-20    PT/INR - ( 20 Jul 2017 10:47 )   PT: 16.7 sec;   INR: 1.52 ratio         PTT - ( 20 Jul 2017 10:47 )  PTT:76.5 sec      MICROBIOLOGY:  RECENT CULTURES:        RADIOLOGY & ADDITIONAL STUDIES:    < from: CT Abdomen and Pelvis No Cont (07.20.17 @ 12:58) >  INTERPRETATION:  HISTORY:   Cardiac arrest. Abdominal pain  TECHNIQUE:  Sections were obtained from the diaphragm to the pubic   symphysis without oral or intravenous contrast.      FINDINGS:  LUNG BASES:  There are large bilateral pleural effusions with compressive   atelectasis. The heart is enlarged with a small pericardial effusion..    GASTROINTESTINAL STRUCTURES:  There is extensive fecal impaction within   the colon, with marked distention an impaction within the rectum and   sigmoid. There is thickening of the rectum with suggestion of stercoral   colitis. Small and large bowel loops are distended and fluid-filled in   addition to fecal matter. There is suggestion of fecal is aeration within   small bowel..  LIVER:  Unremarkable in appearance.  SPLEEN:  No enlargement or focal lesion is evident.    HEPATOBILIARY:  The gallbladder is normal in appearance.  No calculus is   visible.  The intrahepatic biliary ducts are not dilated.  The common   duct is normal in size.  PANCREAS:  Atrophic in appearance  ADRENAL GLANDS:  Within normal limits.  RIGHT KIDNEY:  Severely atrophic  LEFT KIDNEY:  Severely atrophic.  AORTA:  No aneurysmal dilatation is noted.   PELVIC ORGANS:  Unremarkable in appearance.  BLADDER:  A Oswald catheter is noted within an empty bladder  BONES:  No evidence of fracture or bony destructive lesion.    IMPRESSION:       1. Extensive fecal retention and impaction within the colon, with the   possible stercoral colitis in addition to fecal impaction. No perforation   suggested. No obvious pneumatosis intestinalis underlying for fecal   matter.  2. Large bilateral pleural effusions with compressive atelectasis of the   lower lobes. Cardiomegaly with a small pericardial effusion. Mild ascites.  3. Atrophic appearance of both kidneys. Liver and spleen are grossly   intact.

## 2017-07-20 NOTE — PROGRESS NOTE ADULT - SUBJECTIVE AND OBJECTIVE BOX
patient pressor requirements continue to increase.  now maxed epi nor epi.  family aware of poor prognosis.  will add marsha if a 4th pressor is required.

## 2017-07-20 NOTE — ED ADULT NURSE NOTE - OBJECTIVE STATEMENT
Patient brought in by EMS, Asystole in the field and given 2 Epi before arriving. No family at bedside, CPR in progress, patient transferred onto bed and placed on monitor as ER team placed patient on YONY. ACLS initiated.

## 2017-07-20 NOTE — H&P ADULT - NSHPLABSRESULTS_GEN_ALL_CORE
Lab Results:  CBC  CBC Full  -  ( 20 Jul 2017 10:47 )  WBC Count : 17.0 K/uL  Hemoglobin : 9.9 g/dL  Hematocrit : 34.0 %  Platelet Count - Automated : 186 K/uL  Mean Cell Volume : 97.7 fl  Mean Cell Hemoglobin : 28.5 pg  Mean Cell Hemoglobin Concentration : 29.1 gm/dL  Auto Neutrophil # : x  Auto Lymphocyte # : x  Auto Monocyte # : x  Auto Eosinophil # : x  Auto Basophil # : x  Auto Neutrophil % : x  Auto Lymphocyte % : x  Auto Monocyte % : x  Auto Eosinophil % : x  Auto Basophil % : x    .		Differential:	[] Automated		[] Manual  Chemistry                        9.9    17.0  )-----------( 186      ( 20 Jul 2017 10:47 )             34.0     07-20    143  |  100  |  52<H>  ----------------------------<  268<H>  6.6<HH>   |  21<L>  |  6.06<H>    Ca    10.7<H>      20 Jul 2017 10:47  Mg     3.3     07-20    TPro  6.3  /  Alb  1.7<L>  /  TBili  0.5  /  DBili  x   /  AST  6322<H>  /  ALT  2895<H>  /  AlkPhos  142<H>  07-20    LIVER FUNCTIONS - ( 20 Jul 2017 10:47 )  Alb: 1.7 g/dL / Pro: 6.3 gm/dL / ALK PHOS: 142 U/L / ALT: 2895 U/L / AST: 6322 U/L / GGT: x           PT/INR - ( 20 Jul 2017 10:47 )   PT: 16.7 sec;   INR: 1.52 ratio         PTT - ( 20 Jul 2017 10:47 )  PTT:76.5 sec      ABG - ( 20 Jul 2017 10:55 )  pH: x     /  pCO2: 79    /  pO2: 109   / HCO3: 18    / Base Excess: -14.7 /  SaO2: 92                  MICROBIOLOGY/CULTURES:      RADIOLOGY RESULTS:  < from: Xray Chest 1 View AP/PA. (07.20.17 @ 13:12) >      IMPRESSION:   Mild improvement in bilateral pleural effusions.  Persistent bibasilar atelectases.  Endotracheal tube and left-sided permacath in appropriate position.      < from: CT Abdomen and Pelvis No Cont (07.20.17 @ 12:58) >    1. Extensive fecal retention and impaction within the colon, with the   possible stercoral colitis in addition to fecal impaction. No perforation   suggested. No obvious pneumatosis intestinalis underlying for fecal   matter.  2. Large bilateral pleural effusions with compressive atelectasis of the   lower lobes. Cardiomegaly with a small pericardial effusion. Mild ascites.  3. Atrophic appearance of both kidneys. Liver and spleen are grossly   intact.          < from: CT Brain Stroke Protocol (07.20.17 @ 12:58) >    IMPRESSION:    1)  chronic ischemic changes within both hemispheres with superimposed   anoxic injury to the brain. No evidence of hemorrhage. Follow-up MR   imaging recommended when stable.  2)  the patientis intubated..

## 2017-07-20 NOTE — H&P ADULT - ATTENDING COMMENTS
I took the history and examined the patient with DAMARIS Reddy. I have reviewed her note and agree with the above history, physical, and plan except for as stated below.    61 y/o M w/MR, DM, ESRD on HD ([facility]) last session on Tuesday prior to admission who presented following witnessed cardiac arrest. Total time down ~ 35 minutes prior to ROSC. Patient was intubated in the field and placed on pressors following ROSC. On my exam patient unarouseable, with no pupillary, corneal, or gag reflexes. Negative dolls eyes. Patient not overbreathing the ventilator. Unclear etiology for arrest, possible aspiration event vs hypoglycemia, vs other etiology. Along with significant findings on physical exam patient has CT head showing anoxic brain injury. Patient with extremely poor prognosis and possible brain death.    Neuro: Post arrest, anoxic brain injury on CT scan, currently no brain stem reflexes on exam. Will continue supportive care and will assess for brain death if lack of brainstem reflexes persist. Will do targeted temperature management. Goal temp 36 degrees C.    Respiratory: Acute respiratory failure in the setting of cardiac arrest and loss of brainstem reflexes.   - Mechanical ventilation, A/C VC w/TV 6cc/Kg IBW 440cc    CV: Post arrest now in shock, unclear if septic shock or just due to post arrest state.  - Titrate pressors to goal MAP >= 65  - Broad spectrum Abx    Renal: ESRD on HD w/hyperkalemia post ROSC  - Renal to attempt to dialyze, continue medical treatment with bicarb, D50, insulin, and albuterol    ID: Possible sepsis  - Continue Vanc/Zosyn    FEN: Dialysis, replete lytes PRN, NPO for now    PPx: Heparin SC, Protonix    Ethics: I spoke with the patient's family and informed them of the events, findings, and likelihood of poor outcome. No decisions were made regarding changes in code status or GOC at this time, however family did note that they do not want him to suffer any further. Family was offered for sloane services to be contacted and they expressed a desire to have a sloane come. Will continue advanced care planning conversations after patient has been transferred to the ICU.     Dispo: Requires ICU level of care    CCT: 35 min

## 2017-07-20 NOTE — CONSULT NOTE ADULT - ASSESSMENT
62 male with a history of MR, HTN, HLD, ESRD on HD now with cardiac arrest.  hyperkalemia and hemodynamically unstable at this time.  spoke to ER and advised to give insulin and glucose.  will also dialyze today for 3 hours with 2 k bath.  prognosis is very poor.

## 2017-07-20 NOTE — ED PROVIDER NOTE - MEDICAL DECISION MAKING DETAILS
63 yo M with cardiac arrest, possible 2/2 sepsis, acidotic on blood gas, CBG 37 in hospital  -pt. resuscitated and pulse achieved after 11 minutes cpr (as described in HPI)  -labs sent including, cbc, cmp, trop, ckmb, blood cx, lactate, blood gas, bnp; CT abd/pel, brain, xr chest  -vanc zosyn, IV  mL, levophed infusion, monitor, icu consult

## 2017-07-20 NOTE — ED PROVIDER NOTE - CRITICAL CARE PROVIDED
direct patient care (not related to procedure)/documentation/interpretation of diagnostic studies/consultation with other physicians/additional history taking/consult w/ pt's family directly relating to pts condition

## 2017-07-20 NOTE — PROVIDER CONTACT NOTE (EICU) - ACTION/TREATMENT ORDERED:
Request for [facility] referral by staff.  Dari Cooper from [facility].  Reference ID# [id number]

## 2017-07-20 NOTE — H&P ADULT - HISTORY OF PRESENT ILLNESS
63 yo M PMH MR--nonverbal, dialysis dependent t, th, sat (last tuesday), BIBEMS after pt became unresponsive at home.  As per pt family pt has been spiking fevers for the last 1-2 days, medicating with Tylenol. This morning pt was being fed and began to cough, took a gasp of air, pt was turned over on his side by family and suddenly became unresponsive. Pt was down for 10 min before EMS arrived.  ACLS/compressions was started in the field for 15 min and continued for 11 minutes in the hospital.  Pt was intubated and received 4 epi, 1 bicarb, 1 calcium, 2 amp D50,  finger stick 37.  Pt's K+ found to be 6.6, post intubation ABG 6.98/79/109/18,  ROSC after about 25 min of being down.  Pt remains unresponsive off sedation.

## 2017-07-20 NOTE — ED ADULT TRIAGE NOTE - CHIEF COMPLAINT QUOTE
Arrived in cardiac arrest, intubated in field Arrived in cardiac arrest, intubated in field, found pulseless in hospital bed at home

## 2017-07-20 NOTE — PROCEDURE NOTE - NSPROCDETAILS_GEN_ALL_CORE
lumen(s) aspirated and flushed/sterile dressing applied/guidewire recovered/ultrasound guidance/sterile technique, catheter placed

## 2017-07-20 NOTE — PROGRESS NOTE ADULT - SUBJECTIVE AND OBJECTIVE BOX
patient with increasing pressor requirements.  epi added to levo and vaso.  still hypotensive.  Had discussion with family regarding prognosis and need for central access.  Family wished pressors to continue.  central line placed femorally after assessing both subclavians and IJs.  No right IJ, left IJ extremely small in diameter as well as both subclavians.  I believed that femoral access was the safest method in this critically ill patient. Prognosis grim

## 2017-07-20 NOTE — ED PROVIDER NOTE - OBJECTIVE STATEMENT
63 yo M bibems as cardiac arrest, down 10 minutes as per family after becoming unresponsive while eating.  EMS did 15 minutes of compressions, they gave 2 epis and 1 calcium.  CPR was continued for 11 minutes in hospital 4 epi, 1 bicarb, 1 calcium, 2 amp D50.  finger stick 37, final 215.  Pt. is unresponsive and intubated by EMS, tube confirmed by direct visualization and CO2.  Family agrees with story--present in ED.  Family explains he had a fever yesterday and saw PCP, ?infection.  ROS: unobtainable from patient  PMH: negative; Meds: Denies; SH: Denies smoking/drinking/drug use 61 yo M bibems as cardiac arrest, down 10 minutes as per family after becoming unresponsive while eating.  EMS did 15 minutes of compressions, they gave 2 epis and 1 calcium.  CPR was continued for 11 minutes in hospital 4 epi, 1 bicarb, 1 calcium, 2 amp D50.  finger stick 37, final 215.  Pt. is unresponsive and intubated by EMS, tube confirmed by direct visualization and CO2.  Family agrees with story--present in ED.  Family explains he had a fever yesterday and saw PCP, ?infection.  ROS: unobtainable from patient  PMH: Dialysis dependent t, th, sat (last tuesday), MR--nonverbal, renal Ca, Meds: risperdone 1mg, hydralazine 25, clonidine .1, oxcarbazepine 600, levetiracetam 750, amlodipine 10, simvastatin 20; SH: Denies smoking/drinking/drug use

## 2017-07-20 NOTE — H&P ADULT - NSHPPHYSICALEXAM_GEN_ALL_CORE
GENERAL: intubated, unresponsive   EYES: R pupil reactive to light, L pupil sluggishly reactive to light  NERVOUS SYSTEM:  Pt unresponsive, not withdrawing to painful stimulus, absent gag reflex    CHEST/LUNG: breath sounds coarse BL   HEART: Regular rate and rhythm; No murmurs, rubs, or gallops  ABDOMEN: Soft, Nontender, Nondistended; Bowel sounds present  EXTREMITIES:  2+ Peripheral Pulses, No noted edema.  IO placement on left and right extremity   SKIN: left sided permicath GENERAL: intubated, unresponsive   EYES: R pupil reactive to light, L pupil sluggishly reactive to light  NERVOUS SYSTEM:  Pt unresponsive, not withdrawing to painful stimulus, absent gag reflex    CHEST/LUNG: breath sounds coarse BL   HEART: Regular rate and rhythm; No murmurs, rubs, or gallops  ABDOMEN: Soft, Nontender, Nondistended; Bowel sounds present  EXTREMITIES:  2+ Peripheral Pulses, No noted edema.  IO placement on left and right extremity   SKIN: left sided permicath, Stage 4 sacral decubitus ulcer

## 2017-07-20 NOTE — ED ADULT NURSE REASSESSMENT NOTE - NS ED NURSE REASSESS COMMENT FT1
Oswald Catheter was placed with no urine output. MD Koroma made aware that patient is a dialysis patient. Shunt on right chest wall.

## 2017-07-21 VITALS — DIASTOLIC BLOOD PRESSURE: 20 MMHG | RESPIRATION RATE: 26 BRPM | SYSTOLIC BLOOD PRESSURE: 60 MMHG

## 2017-07-21 LAB
-  CANDIDA ALBICANS: SIGNIFICANT CHANGE UP
-  CANDIDA GLABRATA: SIGNIFICANT CHANGE UP
-  CANDIDA KRUSEI: SIGNIFICANT CHANGE UP
-  CANDIDA PARAPSILOSIS: SIGNIFICANT CHANGE UP
-  CANDIDA TROPICALIS: SIGNIFICANT CHANGE UP
-  COAGULASE NEGATIVE STAPHYLOCOCCUS: SIGNIFICANT CHANGE UP
-  K. PNEUMONIAE GROUP: SIGNIFICANT CHANGE UP
-  KPC RESISTANCE GENE: SIGNIFICANT CHANGE UP
-  STREPTOCOCCUS SP. (NOT GRP A, B OR S PNEUMONIAE): SIGNIFICANT CHANGE UP
A BAUMANNII DNA SPEC QL NAA+PROBE: SIGNIFICANT CHANGE UP
ALBUMIN SERPL ELPH-MCNC: 1.5 G/DL — LOW (ref 3.3–5)
ALP SERPL-CCNC: 152 U/L — HIGH (ref 40–120)
ALT FLD-CCNC: 4647 U/L — HIGH (ref 12–78)
ANION GAP SERPL CALC-SCNC: 23 MMOL/L — HIGH (ref 5–17)
APTT BLD: 48.2 SEC — HIGH (ref 27.5–37.4)
AST SERPL-CCNC: HIGH U/L (ref 15–37)
BILIRUB SERPL-MCNC: 1.6 MG/DL — HIGH (ref 0.2–1.2)
BUN SERPL-MCNC: 59 MG/DL — HIGH (ref 7–23)
CALCIUM SERPL-MCNC: 7.1 MG/DL — LOW (ref 8.5–10.1)
CHLORIDE SERPL-SCNC: 112 MMOL/L — HIGH (ref 96–108)
CO2 SERPL-SCNC: 13 MMOL/L — LOW (ref 22–31)
CREAT SERPL-MCNC: 5.4 MG/DL — HIGH (ref 0.5–1.3)
E CLOAC COMP DNA BLD POS QL NAA+PROBE: SIGNIFICANT CHANGE UP
E COLI DNA BLD POS QL NAA+NON-PROBE: SIGNIFICANT CHANGE UP
ENTEROCOC DNA BLD POS QL NAA+NON-PROBE: SIGNIFICANT CHANGE UP
ENTEROCOC DNA BLD POS QL NAA+NON-PROBE: SIGNIFICANT CHANGE UP
GLUCOSE SERPL-MCNC: 100 MG/DL — HIGH (ref 70–99)
GP B STREP DNA BLD POS QL NAA+NON-PROBE: SIGNIFICANT CHANGE UP
GRAM STN FLD: SIGNIFICANT CHANGE UP
GRAM STN FLD: SIGNIFICANT CHANGE UP
HAEM INFLU DNA BLD POS QL NAA+NON-PROBE: SIGNIFICANT CHANGE UP
HBA1C BLD-MCNC: 5.6 % — SIGNIFICANT CHANGE UP (ref 4–5.6)
HCT VFR BLD CALC: 33.5 % — LOW (ref 39–50)
HGB BLD-MCNC: 9.8 G/DL — LOW (ref 13–17)
INR BLD: 3.12 RATIO — HIGH (ref 0.88–1.16)
K OXYTOCA DNA BLD POS QL NAA+NON-PROBE: SIGNIFICANT CHANGE UP
L MONOCYTOG DNA BLD POS QL NAA+NON-PROBE: SIGNIFICANT CHANGE UP
MAGNESIUM SERPL-MCNC: 2.2 MG/DL — SIGNIFICANT CHANGE UP (ref 1.6–2.6)
MCHC RBC-ENTMCNC: 28 PG — SIGNIFICANT CHANGE UP (ref 27–34)
MCHC RBC-ENTMCNC: 29.3 GM/DL — LOW (ref 32–36)
MCV RBC AUTO: 95.6 FL — SIGNIFICANT CHANGE UP (ref 80–100)
METHOD TYPE: SIGNIFICANT CHANGE UP
MRSA SPEC QL CULT: SIGNIFICANT CHANGE UP
MSSA DNA SPEC QL NAA+PROBE: SIGNIFICANT CHANGE UP
N MEN ISLT CULT: SIGNIFICANT CHANGE UP
P AERUGINOSA DNA BLD POS NAA+NON-PROBE: SIGNIFICANT CHANGE UP
PHOSPHATE SERPL-MCNC: 5.6 MG/DL — HIGH (ref 2.5–4.5)
PLATELET # BLD AUTO: 148 K/UL — LOW (ref 150–400)
POTASSIUM SERPL-MCNC: 4.2 MMOL/L — SIGNIFICANT CHANGE UP (ref 3.5–5.3)
POTASSIUM SERPL-SCNC: 4.2 MMOL/L — SIGNIFICANT CHANGE UP (ref 3.5–5.3)
PROT SERPL-MCNC: 5.4 GM/DL — LOW (ref 6–8.3)
PROTEUS SP DNA BLD POS QL NAA+NON-PROBE: SIGNIFICANT CHANGE UP
PROTHROM AB SERPL-ACNC: 34.8 SEC — HIGH (ref 9.8–12.7)
RBC # BLD: 3.5 M/UL — LOW (ref 4.2–5.8)
RBC # FLD: 16.1 % — HIGH (ref 11–15)
S MARCESCENS DNA BLD POS NAA+NON-PROBE: SIGNIFICANT CHANGE UP
S PNEUM DNA BLD POS QL NAA+NON-PROBE: SIGNIFICANT CHANGE UP
S PYO DNA BLD POS QL NAA+NON-PROBE: SIGNIFICANT CHANGE UP
SODIUM SERPL-SCNC: 148 MMOL/L — HIGH (ref 135–145)
SPECIMEN SOURCE: SIGNIFICANT CHANGE UP
SPECIMEN SOURCE: SIGNIFICANT CHANGE UP
VANCOMYCIN FLD-MCNC: 14.9 UG/ML — SIGNIFICANT CHANGE UP
WBC # BLD: 7.8 K/UL — SIGNIFICANT CHANGE UP (ref 3.8–10.5)
WBC # FLD AUTO: 7.8 K/UL — SIGNIFICANT CHANGE UP (ref 3.8–10.5)

## 2017-07-21 PROCEDURE — 99238 HOSP IP/OBS DSCHRG MGMT 30/<: CPT

## 2017-07-21 RX ADMIN — EPINEPHRINE 1.8 MICROGRAM(S)/KG/MIN: 0.3 INJECTION INTRAMUSCULAR; SUBCUTANEOUS at 07:55

## 2017-07-21 RX ADMIN — PIPERACILLIN AND TAZOBACTAM 25 GRAM(S): 4; .5 INJECTION, POWDER, LYOPHILIZED, FOR SOLUTION INTRAVENOUS at 06:03

## 2017-07-21 RX ADMIN — HEPARIN SODIUM 5000 UNIT(S): 5000 INJECTION INTRAVENOUS; SUBCUTANEOUS at 06:03

## 2017-07-21 RX ADMIN — Medication 4.49 MICROGRAM(S)/KG/MIN: at 08:58

## 2017-07-21 RX ADMIN — EPINEPHRINE 1.8 MICROGRAM(S)/KG/MIN: 0.3 INJECTION INTRAMUSCULAR; SUBCUTANEOUS at 01:43

## 2017-07-21 RX ADMIN — Medication 4.49 MICROGRAM(S)/KG/MIN: at 01:43

## 2017-07-21 RX ADMIN — CHLORHEXIDINE GLUCONATE 15 MILLILITER(S): 213 SOLUTION TOPICAL at 06:03

## 2017-07-21 NOTE — DIETITIAN INITIAL EVALUATION ADULT. - ENERGY NEEDS
Height (cm): 167.64 cm/ 5'6"(07-21)  Weight (kg): 47.9 (07-20)  BMI (kg/m2): 17.0 (07-21)  IBW: 64.4kg  % IBW: 74%

## 2017-07-21 NOTE — DISCHARGE NOTE FOR THE EXPIRED PATIENT - SECONDARY DIAGNOSIS.
Chronic renal failure, stage 5 Type 2 diabetes mellitus with chronic kidney disease on chronic dialysis, unspecified long term insulin use status

## 2017-07-21 NOTE — DISCHARGE NOTE FOR THE EXPIRED PATIENT - HOSPITAL COURSE
63 y/o M w/MR, DM, ESRD on HD ([facility]) last session on Tuesday prior to admission who presented following witnessed cardiac arrest. Total time down ~ 35 minutes prior to ROSC. Patient was intubated in the field and placed on pressors following ROSC. On my exam patient unarouseable, with no pupillary, corneal, or gag reflexes. Negative dolls eyes. Patient not overbreathing the ventilator. Unclear etiology for arrest, possible aspiration event vs hypoglycemia, vs other etiology. Along with significant findings on physical exam patient has CT head showing anoxic brain injury. Patient with extremely poor prognosis and possible brain death.    Patient was placed on maximal pressors and was transferred to the ICU. Patient continued to decline despite maximal medical care. Patient was made DNR and  this morning.

## 2017-07-21 NOTE — DIETITIAN INITIAL EVALUATION ADULT. - PHYSICAL APPEARANCE
BMI=19.3/other (specify)/debilitated debilitated/? height of 5'2", pt appears taller,  BMI=17.0  for height of 5'6"(as per previous adm record 2/7/17)/other (specify) other (specify)/? height of 5'2", pt appears taller,  BMI=17.0  for height of 5'6"(as per previous adm record 2/7/17), 2/7, wt. 47.6 kg/debilitated

## 2017-07-21 NOTE — DIETITIAN INITIAL EVALUATION ADULT. - OTHER INFO
Pt. was seen due to RN consult for chewing/swallowing, dialysis. failure to thrive & CCU adm.  Pt adm c dx of cardiac arrest, sepsis.  Pt c poor prognosis at present. Pt. was seen due to RN consult for chewing/swallowing, dialysis. failure to thrive & CCU adm.  Pt adm c dx of cardiac arrest, sepsis.  Pt c poor prognosis at present.  Pt is DNR.

## 2017-07-21 NOTE — DIETITIAN INITIAL EVALUATION ADULT. - PERTINENT LABORATORY DATA
07-21 Na148 mmol/L<H> Glu 100 mg/dL<H> K+ 4.2 mmol/L Cr  5.40 mg/dL<H> BUN 59 mg/dL<H> Est GFR 11 mL/min/1.73M2<L> Phos 5.6 mg/dL<H> Ca 7.1 mg/dL<L> Alb 1.5 g/dL<L> elevated LFTs,  Hgb 9.8 g/dL<L> Hct 33.5 %<L> 07-21 Na148 mmol/L<H> Glu 100 mg/dL<H> K+ 4.2 mmol/L Cr  5.40 mg/dL<H> BUN 59 mg/dL<H> Est GFR 11 mL/min/1.73M2<L> Phos 5.6 mg/dL<H> Ca 7.1 mg/dL<L> Alb 1.5 g/dL<L> elevated LFTs,  Hgb 9.8 g/dL<L> Hct 33.5 %<L> 07-20 Glucose 268 mg/dL<H> serum pro-brain natriuretic peptide 46566 pg/dL<H>

## 2017-07-23 LAB
-  AMIKACIN: SIGNIFICANT CHANGE UP
-  AZTREONAM: SIGNIFICANT CHANGE UP
-  CEFEPIME: SIGNIFICANT CHANGE UP
-  CEFOTAXIME: SIGNIFICANT CHANGE UP
-  CEFTAZIDIME: SIGNIFICANT CHANGE UP
-  CEFTRIAXONE: SIGNIFICANT CHANGE UP
-  CIPROFLOXACIN: SIGNIFICANT CHANGE UP
-  GENTAMICIN: SIGNIFICANT CHANGE UP
-  IMIPENEM: SIGNIFICANT CHANGE UP
-  LEVOFLOXACIN: SIGNIFICANT CHANGE UP
-  MEROPENEM: SIGNIFICANT CHANGE UP
-  PIPERACILLIN/TAZOBACTAM: SIGNIFICANT CHANGE UP
-  TOBRAMYCIN: SIGNIFICANT CHANGE UP
-  TRIMETHOPRIM/SULFAMETHOXAZOLE: SIGNIFICANT CHANGE UP
CULTURE RESULTS: SIGNIFICANT CHANGE UP
CULTURE RESULTS: SIGNIFICANT CHANGE UP
GRAM STN FLD: SIGNIFICANT CHANGE UP
GRAM STN FLD: SIGNIFICANT CHANGE UP
METHOD TYPE: SIGNIFICANT CHANGE UP
ORGANISM # SPEC MICROSCOPIC CNT: SIGNIFICANT CHANGE UP
SPECIMEN SOURCE: SIGNIFICANT CHANGE UP
SPECIMEN SOURCE: SIGNIFICANT CHANGE UP

## 2018-04-25 NOTE — ED PROVIDER NOTE - PHYSICAL EXAMINATION
Patient discharged with v/s stable. Written and verbal after care instructions 
given and explained. 

Patient alert, oriented and verbalized understanding of instructions. 
Ambulatory with steady gait. All questions addressed prior to discharge. ID 
band removed. Patient advised to follow up with PMD. Rx of AMOXICILLIN, 
TYLENOL, MOTRIN given. Patient educated on indication of medication including 
possible reaction and side effects. Opportunity to ask questions provided and 
answered. Vitals: will follow  Gen: intubated, unresponsive, no spontaneous movement  Head: ncat, pupils 2mm b/l, minimally responsive, no occular reflex  Neck: supple, no lymphadenopathy, no midline deviation  Heart: rrr, no m/r/g  Lungs: b/l breath sounds equal, crackles, rhonchi; dialysis catheter on chest, wrapped, no signs of local infection  Abd: soft, nontender, non-distended  Ext: no clubbing/cyanosis/edema   Neuro: unable to gauge

## 2018-06-07 NOTE — PROGRESS NOTE ADULT - PROBLEM/PLAN-7
Outcome: no answer    Please transfer to Patient Outreach Team at [phone] when patient returns call.           DISPLAY PLAN FREE TEXT

## 2019-01-28 NOTE — ED ADULT NURSE NOTE - CAS DISCH ACCOMP BY
Discussed with wife over the phone  So far he did not started physical therapy  Advised to start physical therapy as soon as possible and continue on medicine and will see him in couple of weeks after he start physical therapy  RN

## 2019-08-10 NOTE — ED PROVIDER NOTE - CARE PLAN
10-Aug-2019 02:12
Principal Discharge DX:	Sepsis affecting skin  Secondary Diagnosis:	Mental retardation  Secondary Diagnosis:	Altered mental status

## 2019-08-21 NOTE — H&P ADULT. - VENOUS THROMBOEMBOLISM FOR WOMEN ONLY
not applicable Patient c/o throat discomfort since this AM which turned into throat pain. Family went to [facility] who sent the pt to the ER. Patient has abscess noted to right back of mouth. Patient's speech is garbled. Patient states that he has 7/10 pain. No difficulty swallowing or breathing. No PMH/PSH. No allergies. Recent travel to [place] for 6 weeks, developed 24 hour virus symptoms on trip home on 6/26 which self resolved. VUTD.

## 2020-03-19 NOTE — ED ADULT NURSE NOTE - NS PRO AD BILL OF RIGHTS
Yes Ecchymoses on upper medial arms bilaterally and right forearm, No cyanosis, no pallor, no jaundice, no rash.

## 2020-10-03 NOTE — ED ADULT NURSE NOTE - TEMPLATE
This is the Subsequent Medicare Annual Wellness Exam, performed 12 months or more after the Initial AWV or the last Subsequent AWV I have reviewed the patient's medical history in detail and updated the computerized patient record. Doing well On renal transplant list at [facility]; he has ESRD No new complaints Pt has a h/o diabetic foot ulcer with amputation; History Patient Active Problem List  
Diagnosis Code  HLD (hyperlipidemia) E78.5  
 HTN (hypertension) I10  
 Diabetic retinopathy ([address] E11.319  
 Noncompliance with treatment Z91.19  Type II or unspecified type diabetes mellitus without mention of complication, uncontrolled [id number]  Paresthesias/numbness [id number]  Diabetes mellitus with renal manifestations, uncontrolled (HCC) E11.29, E11.65  Diabetic foot ulcer ([address] E11.621, L97.509  Gangrene of toe (HCC) I96  
 Dry gangrene ([address] L22  Chronic kidney disease, stage IV (severe) ([address] N18.4  Renal failure (ARF), acute on chronic (HCC) N17.9, N18.9  Diabetic nephropathy (HCC) E11.21  
 Hypertension I10  
 Malignant hypertension I10  
 CKD (chronic kidney disease) N18.9  Hyperlipidemia E78.5  Type 2 diabetes mellitus with complication, with long-term current use of insulin (HCC) E11.8, Z79.4  Orthostatic hypotension I95.1  Chest pain R07.9  Stage 5 chronic kidney disease not on chronic dialysis ([address] N18.5  Diabetes mellitus due to underlying condition, uncontrolled, with diabetic nephropathy, with long-term current use of insulin ([facility]) E08.21, E08.65, Z79.4  Elevated troponin R79.89  
 Kidney disease N28.9  ESRD (end stage renal disease) ([address] N18.6  Anemia D64.9  Hypoalbuminemia E88.09  
 Blindness of one eye H54.40  ESRD on dialysis ([address] N18.6, Z99.2  Status post amputation of foot ([address] S72.734  Status post amputation of toe of left foot ([address] E14.739 Past Medical History:  
Diagnosis Date  Blindness of one eye left  
 Cardiac echocardiogram 10/21/2016 EF 60-65%. No WMA. Mod-marked LVH. Normal diastolic fx. No significant valvular heart disease.  Cardiac treadmill stress test, low risk 11/02/2012 Negative maximal exercise treadmill test.  Ex time 7 min 45 sec.  Cardiovascular LE peripheral arterial testing 03/22/2016 No significant peripheral arterial disease at rest bilaterally. ABIs deferred due to calcified vessels.  Cardiovascular LLE venous duplex 06/06/2012 Left leg:  No DVT.  Cardiovascular renal duplex 10/21/2016 No significant renal artery stenosis.  Chronic kidney disease   
 [id number]  
 CKD (chronic kidney disease), stage V ([address]  Diabetes mellitus ([facility] 3/12/2010  Diabetic retinopathy ([facility] 5/4/2010  Edema of both legs  Eye examination 5/4/10 OU: 20/20; OD: 20/25; OS: 20/25 without correction.  GERD (gastroesophageal reflux disease)  Glaucoma 08/17/2017  
 Nikkie Lusty  
 HLD (hyperlipidemia) 3/12/2010  
 HTN (hypertension) 3/12/2010  Orthostatic hypertension Past Surgical History:  
Procedure Laterality Date  COLONOSCOPY N/A 1/10/2019 COLONOSCOPY w polyp[ectomy performed by Becca James MD at [address] [facility] [address] Left TOES-small  HX HERNIA REPAIR  2005  HX OTHER SURGICAL  11/07/2017  
 glaucoma valve- Ahmed  HX VASCULAR ACCESS    
 HD catheter right neck Current Outpatient Medications Medication Sig Dispense Refill  finasteride (PROSCAR) 5 mg tablet TAKE 1 TABLET BY MOUTH EVERY DAY 90 Tab 0  cloNIDine (CATAPRES) 0.3 mg/24 hr APPLY 1 PATCH TO SKIN ONCE EVERY 7 DAYS 12 Patch 2  
 fluticasone propionate (FLONASE) 50 mcg/actuation nasal spray INSTILL 1 SPRAY IN BOTH NOSTRILS TWICE DAILY 1 Bottle 2  
 rosuvastatin (CRESTOR) 20 mg tablet TAKE 1 TABLET BY MOUTH EVERY DAY 90 Tab 2  cyclobenzaprine (FLEXERIL) 10 mg tablet Take 1 Tab by mouth three (3) times daily as needed for Muscle Spasm(s). 30 Tab 0  
 hydrALAZINE (APRESOLINE) 50 mg tablet Take 1 Tab by mouth two (2) times a day. 180 Tab 3  
 brinzolamide (AZOPT OP) Apply  to eye. Indications: 3 times daily  amLODIPine (NORVASC) 10 mg tablet TAKE 1 TABLET BY MOUTH DAILY. 30 Tab 6  
 netarsudil (RHOPRESSA) 0.02 % drop Apply  to eye.  brimonidine-timolol (COMBIGAN) 0.2-0.5 % drop ophthalmic solution Administer 1 Drop to both eyes three (3) times daily.  soft lens rinse,store solution (SHYLA SALINE SENSITIVE EYES) by Does Not Apply route.  losartan (COZAAR) 100 mg tablet Take 50 mg by mouth two (2) times a day.  b complex-vitamin c-folic acid (NEPHROCAPS) 1 mg capsule Take 1 Cap by mouth daily. 30 Cap 6  calcium acetate (PHOSLO) 667 mg cap Take 1 Cap by mouth three (3) times daily (with meals). 90 Cap 2  
 insulin lispro (HUMALOG) 100 unit/mL injection Blood Sugar (mg/dL): <150 =0 units; 150 -199 =2 units; 200 -249 =4 units; 250 -299 =6 units; 300 -349 =8 units; 350 and above =10 units. 1 Vial 2  
 acetaminophen (TYLENOL EXTRA STRENGTH) 500 mg tablet Take  by mouth every six (6) hours as needed for Pain.  docusate sodium (COLACE) 100 mg capsule Take 1 Cap by mouth two (2) times a day. 60 Cap 0  
 cyanocobalamin (VITAMIN B-12) 1,000 mcg tablet Take 1,000 mcg by mouth daily. Allergies Allergen Reactions  Bactrim [Sulfamethoprim Ds] Nausea and Vomiting  Coreg [Carvedilol] Nausea and Vomiting Difficulty walking Family History Problem Relation Age of Onset  Diabetes Sister  Sickle Cell Anemia Sister  Diabetes Sister Social History Tobacco Use  Smoking status: Never Smoker  Smokeless tobacco: Never Used Substance Use Topics  Alcohol use: No  
 
 
Depression Risk Factor Screening:  
 
3 most recent PHQ Screens 9/28/2020 Little interest or pleasure in doing things Not at all Feeling down, depressed, irritable, or hopeless Not at all Total Score PHQ 2 0 Trouble falling or staying asleep, or sleeping too much - Feeling tired or having little energy - Poor appetite, weight loss, or overeating - Feeling bad about yourself - or that you are a failure or have let yourself or your family down - Trouble concentrating on things such as school, work, reading, or watching TV - Moving or speaking so slowly that other people could have noticed; or the opposite being so fidgety that others notice - Thoughts of being better off dead, or hurting yourself in some way -  
PHQ 9 Score - How difficult have these problems made it for you to do your work, take care of your home and get along with others - Alcohol Risk Screen Do you average more than 2 drinks per night or 14 drinks a week: No 
 
On any one occasion in the past three months have you have had more than 4 drinks containing alcohol:  No 
 
 
 
Functional Ability and Level of Safety:  
Hearing: Hearing is good. Activities of Daily Living: The home contains: no safety equipment. Patient does total self care Ambulation: with mild difficulty Fall Risk: 
No flowsheet data found. Abuse Screen: 
Patient is not abused Cognitive Screening Has your family/caregiver stated any concerns about your memory: no 
 
Cognitive Screening: cognition intact Patient Care Team  
Patient Care Team: 
Ronald Ledesma MD as PCP - Tammy Conklin MD as PCP - [facility] Sykes MD (Cardiology) Magnolia Rodrigez NP (Nurse Practitioner) NEYMAR Robbins PA (Physician Assistant) Assessment/Plan Education and counseling provided: 
Are appropriate based on today's review and evaluation Diagnoses and all orders for this visit: 
 
1. Medicare annual wellness visit, subsequent 2. Status post amputation of toe of left foot ([facility] 3. Diabetic ulcer of other part of left foot associated with diabetes mellitus due to underlying condition, with other ulcer severity ([facility] -     LIPID PANEL; Future -     METABOLIC PANEL, COMPREHENSIVE; Future 
-     HEMOGLOBIN A1C WITH EAG; Future 4. ESRD (end stage renal disease) on dialysis ([facility] Health Maintenance Due Topic Date Due  Shingrix Vaccine Age 50> (1 of 2) 10/14/2010  Pneumococcal 0-64 years (2 of 3 - PCV13) 04/05/2017  MICROALBUMIN Q1  03/21/2018 As above,  treatment plan as listed below Orders Placed This Encounter  LIPID PANEL  
 METABOLIC PANEL, COMPREHENSIVE  
 HEMOGLOBIN A1C WITH EAG Follow-up and Dispositions · Return in about 4 months (around 1/28/2021) for Chol, htn. This has been fully explained to the patient, who indicates understanding. An After Visit Summary was printed and given to the patient.  
 
 
Daryl Gutierrez MD  
 General

## 2022-11-29 NOTE — PROGRESS NOTE ADULT - PROBLEM SELECTOR PLAN 7
98.2
Continue present treatment  continue antibiotics  Urine culture: + klebsiella pneumoniae sensitive to ceftriaxone therapy
continue antibiotics  Urine culture: + klebsiella pneumoniae sensitive to ceftriaxone therapy
continue antibiotics  follow up urine culture sensitivity and specificity  urine culture: + 100,000 cfu gram negative rods

## 2022-12-21 NOTE — H&P ADULT. - PROBLEM/PLAN-5
Pt here requesting help for substance abuse and SI. Pt reports \"it's just a matter of time before I end it all\", \"what's the point of my life?\". Pt reports thoughts of wanting to kill himself by overdosing on heroin. States he currently uses heroin, cocaine, adderral, and norco.   
DISPLAY PLAN FREE TEXT

## 2023-04-18 NOTE — ED PROVIDER NOTE - SKIN WOUND DESCRIPTION
cloNIDine (CATAPRES) 0.1 MG tablet 56 tablet 1 3/1/2023     multivitamin  with iron (SM COMPLETE ADVANCED FORMULA) TABS 28 tablet 0 3/21/2023     levomilnacipran (FETZIMA) 80 MG 24 hr capsule 28 capsule 2 1/6/2023       lamoTRIgine (LAMICTAL) 200 MG tablet 28 tablet 2 1/30/2023   Last Office Visit: 01/30/23  Future Office visit:    Next 5 appointments (look out 90 days)    May 04, 2023  3:00 PM  (Arrive by 2:45 PM)  SHORT with Gaby Mitchell CNP  [facility] ([facility] ) [address]  [facility] [address]  [phone]           Routing refill request to provider for review/approval because:      
DIRTY

## 2023-11-08 NOTE — DISCHARGE NOTE FOR THE EXPIRED PATIENT - PRINCIPAL DIAGNOSIS
He already completed blood work that was ordered. No further blood work is necessary.   Cardiac arrest

## 2025-06-12 NOTE — ED ADULT TRIAGE NOTE - CCCP TRG CHIEF CMPLNT
Try topical Voltaren gel on the knee.           Medicare Wellness Visit  Plan for Preventive Care    A good way for you to stay healthy is to use preventive care.  Medicare covers many services that can help you stay healthy.* The goal of these services is to find any health problems as quickly as possible. Finding problems early can help make them easier to treat.  Your personal plan below lists the services you may need and when they are due.      Health Maintenance Summary       DTaP/Tdap/Td Vaccine (1 - Tdap)  Never done    Pneumococcal Vaccine 50+ (1 of 2 - PCV)  Never done    Lung Cancer Screening (Yearly)  Never done    Shingles Vaccine (1 of 2)  Never done    Hepatitis C Screening (Once)  Never done    Medicare Advantage- Medicare Wellness Visit (Yearly - January to December)  Scheduled for 6/12/2025    COVID-19 Vaccine (1 - 2024-25 season)  Postponed until 9/20/2027    Influenza Vaccine (Season Ended)  Next due on 9/1/2025    Depression Screening (Yearly)  Next due on 9/20/2025    Colorectal Cancer Screening (Colonoscopy - Every 7 Years)  Next due on 10/12/2030    Respiratory Syncytial Virus (RSV) Vaccine 60+ (1 - 1-dose 75+ series)  Next due on 3/3/2037    Hepatitis A Vaccine   Aged Out    Meningococcal Vaccine   Aged Out    Hepatitis B Vaccine (For Physician/APC Discussion)   Aged Out    Meningococcal Serogroup B Vaccine   Aged Out    HPV Vaccine   Aged Out             Preventive Care for Women and Men    Heart Screenings (Cardiovascular):  Blood tests are used to check your cholesterol, lipid and triglyceride levels. High levels can increase your risk for heart disease and stroke. High levels can be treated with medications, diet and exercise. Lowering your levels can help keep your heart and blood vessels healthy.  Your provider will order these tests if they are needed.    An ultrasound is done to see if you have an abdominal aortic aneurysm (AAA).  This is an enlargement of one of the main blood  vessels that delivers blood to the body.   In the [place], 9,000 deaths are caused by AAA.  You may not even know you have this problem and as many as 1 in 3 people will have a serious problem if it is not treated.  Early diagnosis allows for more effective treatment and cure.  If you have a family history of AAA or are a male age 65-75 who has smoked, you are at higher risk of an AAA.  Your provider can order this test, if needed.    Colorectal Screening:  There are many tests that are used to check for cancer of your colon and rectum. You and your provider should discuss what test is best for you and when to have it done.  Options include:  Screening Colonoscopy: exam of the entire colon, seen through a flexible lighted tube.  Flexible Sigmoidoscopy: exam of the last third (sigmoid portion) of the colon and rectum, seen through a flexible lighted tube.  Cologuard DNA stool test: a sample of your stool is used to screen for cancer and unseen blood in your stool.  Fecal Occult Blood Test: a sample of your stool is studied to find any unseen blood    Flu Shot:  An immunization that helps to prevent influenza (the flu). You should get this every year. The best time to get the shot is in the fall.    Pneumococcal Shot:  Vaccines help prevent pneumococcal disease, which is any type of illness caused by Streptococcus pneumoniae bacteria. There are two kinds of pneumococcal vaccines available in the [place]:   Pneumococcal conjugate vaccines (PCV20 or Ocaxrus45®)  Pneumococcal polysaccharide vaccine (PPSV23 or Chaznkovs22®)  For those who have never received any pneumococcal conjugate vaccine, CDC recommends PVC20 for adults 65 years or older and adults 19 through 64 years old with certain medical conditions or risk factors.   For those who have previously received PCV13, this should be followed by a dose of PPSV23.     Hepatitis B Shot:  An immunization that helps to protect people from getting Hepatitis B.  Hepatitis B is a virus that spreads through contact with infected blood or body fluids. Many people with the virus do not have symptoms.  The virus can lead to serious problems, such as liver disease. Some people are at higher risk than others. Your doctor will tell you if you need this shot.     Diabetes Screening:  A test to measure sugar (glucose) in your blood is called a fasting blood sugar. Fasting means you cannot have food or drink for at least 8 hours before the test. This test can detect diabetes long before you may notice symptoms.    Glaucoma Screening:  Glaucoma screening is performed by your eye doctor. The test measures the fluid pressure inside your eyes to determine if you have glaucoma.     Hepatitis C Screening:  A blood test to see if you have the hepatitis C virus.  Hepatitis C attacks the liver and is a major cause of chronic liver disease.  Medicare will cover a single screening for all adults born between 1945 & 1965, or high risk patients (people who have injected illegal drugs or people who have had blood transfusions).  High risk patients who continue to inject illegal drugs can be screened for Hepatitis C every year.    Smoking and Tobacco-Use Cessation Counseling:  Tobacco is the single greatest cause of disease and early death in our country today. Medication and counseling together can increase a person’s chance of quitting for good.   Medicare covers two quitting attempts per year, with four counseling sessions per attempt (eight sessions in a 12 month period)    Preventive Screening tests for Women    Screening Mammograms and Breast Exams:  An x-ray of your breasts to check for breast cancer before you or your doctor may be able to feel it.  If breast cancer is found early it can usually be treated with success.    Pelvic Exams and Pap Tests:  An exam to check for cervical and vaginal cancer. A Pap test is a lab test in which cells are taken from your cervix and sent to the lab to  code sepsis/fever look for signs of cervical cancer. If cancer of the cervix is found early, chances for a cure are good. Testing can generally end at age 65, or if a woman has a hysterectomy for a benign condition. Your provider may recommend more frequent testing if certain abnormal results are found.    Bone Mass Measurements:  A painless x-ray of your bone density to see if you are at risk for a broken bone. Bone density refers to the thickness of bones or how tightly the bone tissue is packed.    Preventive Screening tests for Men    Prostate Screening:  Should you have a prostate cancer test (PSA)?  It is up to you to decide if you want a prostate cancer test. Talk to your clinician to find out if the test is right for you.  Things for you to consider and talk about should include:  Benefits and harms of the test  Your family history  How your race/ethnicity may influence the test  If the test may impact other medical conditions you have  Your values on screenings and treatments    *Medicare pays for many preventive services to keep you healthy. For some of these services, you might have to pay a deductible, coinsurance, and / or copayment.  The amounts vary depending on the type of services you need and the kind of Medicare health plan you have.    For further details on screenings offered by Medicare please visit: https://www.medicare.gov/coverage/preventive-screening-services